# Patient Record
Sex: MALE | Race: BLACK OR AFRICAN AMERICAN | Employment: OTHER | URBAN - METROPOLITAN AREA
[De-identification: names, ages, dates, MRNs, and addresses within clinical notes are randomized per-mention and may not be internally consistent; named-entity substitution may affect disease eponyms.]

---

## 2018-04-18 ENCOUNTER — CARE COORDINATION (OUTPATIENT)
Dept: CARE COORDINATION | Age: 68
End: 2018-04-18

## 2018-05-09 ENCOUNTER — CARE COORDINATION (OUTPATIENT)
Dept: CARE COORDINATION | Age: 68
End: 2018-05-09

## 2018-05-24 ENCOUNTER — HOSPITAL ENCOUNTER (OUTPATIENT)
Age: 68
Discharge: HOME | End: 2018-05-24
Payer: MEDICARE

## 2018-05-24 DIAGNOSIS — E78.5 DYSLIPIDEMIA: ICD-10-CM

## 2018-05-24 DIAGNOSIS — N52.9 ERECTILE DYSFUNCTION, UNSPECIFIED ERECTILE DYSFUNCTION TYPE: ICD-10-CM

## 2018-05-24 DIAGNOSIS — E66.9 DIABETES MELLITUS TYPE 2 IN OBESE (HCC): Chronic | ICD-10-CM

## 2018-05-24 DIAGNOSIS — E11.69 DIABETES MELLITUS TYPE 2 IN OBESE (HCC): Chronic | ICD-10-CM

## 2018-05-24 LAB
CHOLESTEROL, TOTAL: 139 MG/DL (ref 0–199)
CREATININE URINE: 217 MG/DL (ref 40–278)
HBA1C MFR BLD: 6.7 % (ref 4.8–5.9)
HDLC SERPL-MCNC: 54 MG/DL
LDL CHOLESTEROL CALCULATED: 64 MG/DL (ref 0–99)
MICROALBUMIN UR-MCNC: <12 MG/L
MICROALBUMIN/CREAT UR-RTO: ABNORMAL (ref 0–30)
TRIGL SERPL-MCNC: 107 MG/DL (ref 0–149)
TSH SERPL DL<=0.05 MIU/L-ACNC: 3.31 UIU/ML (ref 0.27–4.2)
VLDLC SERPL CALC-MCNC: 21 MG/DL

## 2018-05-24 PROCEDURE — 82044 UR ALBUMIN SEMIQUANTITATIVE: CPT

## 2018-05-24 PROCEDURE — 36415 COLL VENOUS BLD VENIPUNCTURE: CPT

## 2018-05-24 PROCEDURE — 80061 LIPID PANEL: CPT

## 2018-05-24 PROCEDURE — 82570 ASSAY OF URINE CREATININE: CPT

## 2018-05-24 PROCEDURE — 83036 HEMOGLOBIN GLYCOSYLATED A1C: CPT

## 2018-05-24 PROCEDURE — 84443 ASSAY THYROID STIM HORMONE: CPT

## 2018-05-30 ENCOUNTER — CARE COORDINATION (OUTPATIENT)
Dept: CARE COORDINATION | Age: 68
End: 2018-05-30

## 2018-06-01 ENCOUNTER — OFFICE VISIT (OUTPATIENT)
Dept: INTERNAL MEDICINE | Age: 68
End: 2018-06-01
Payer: MEDICARE

## 2018-06-01 VITALS
HEART RATE: 88 BPM | TEMPERATURE: 97.7 F | DIASTOLIC BLOOD PRESSURE: 75 MMHG | WEIGHT: 315 LBS | RESPIRATION RATE: 16 BRPM | SYSTOLIC BLOOD PRESSURE: 134 MMHG | BODY MASS INDEX: 41.75 KG/M2 | HEIGHT: 73 IN

## 2018-06-01 DIAGNOSIS — E11.69 DIABETES MELLITUS TYPE 2 IN OBESE (HCC): Chronic | ICD-10-CM

## 2018-06-01 DIAGNOSIS — N39.8 VOIDING DYSFUNCTION: Chronic | ICD-10-CM

## 2018-06-01 DIAGNOSIS — E78.5 DYSLIPIDEMIA: ICD-10-CM

## 2018-06-01 DIAGNOSIS — N52.9 ERECTILE DYSFUNCTION, UNSPECIFIED ERECTILE DYSFUNCTION TYPE: ICD-10-CM

## 2018-06-01 DIAGNOSIS — E66.9 DIABETES MELLITUS TYPE 2 IN OBESE (HCC): Chronic | ICD-10-CM

## 2018-06-01 PROCEDURE — 4040F PNEUMOC VAC/ADMIN/RCVD: CPT | Performed by: INTERNAL MEDICINE

## 2018-06-01 PROCEDURE — 1123F ACP DISCUSS/DSCN MKR DOCD: CPT | Performed by: INTERNAL MEDICINE

## 2018-06-01 PROCEDURE — 99212 OFFICE O/P EST SF 10 MIN: CPT | Performed by: INTERNAL MEDICINE

## 2018-06-01 PROCEDURE — 3017F COLORECTAL CA SCREEN DOC REV: CPT | Performed by: INTERNAL MEDICINE

## 2018-06-01 PROCEDURE — 3044F HG A1C LEVEL LT 7.0%: CPT | Performed by: INTERNAL MEDICINE

## 2018-06-01 PROCEDURE — 4004F PT TOBACCO SCREEN RCVD TLK: CPT | Performed by: INTERNAL MEDICINE

## 2018-06-01 PROCEDURE — 99213 OFFICE O/P EST LOW 20 MIN: CPT | Performed by: INTERNAL MEDICINE

## 2018-06-01 PROCEDURE — G8427 DOCREV CUR MEDS BY ELIG CLIN: HCPCS | Performed by: INTERNAL MEDICINE

## 2018-06-01 PROCEDURE — 2022F DILAT RTA XM EVC RTNOPTHY: CPT | Performed by: INTERNAL MEDICINE

## 2018-06-01 PROCEDURE — G8417 CALC BMI ABV UP PARAM F/U: HCPCS | Performed by: INTERNAL MEDICINE

## 2018-06-01 RX ORDER — LANCETS 30 GAUGE
EACH MISCELLANEOUS
Qty: 100 EACH | Refills: 3 | Status: SHIPPED | OUTPATIENT
Start: 2018-06-01 | End: 2019-02-04 | Stop reason: SDUPTHER

## 2018-06-01 RX ORDER — ATORVASTATIN CALCIUM 20 MG/1
20 TABLET, FILM COATED ORAL DAILY
Qty: 30 TABLET | Refills: 3 | Status: ON HOLD | OUTPATIENT
Start: 2018-06-01 | End: 2018-06-26 | Stop reason: HOSPADM

## 2018-06-01 RX ORDER — SILDENAFIL CITRATE 20 MG/1
TABLET ORAL
Qty: 30 TABLET | Refills: 0 | Status: ON HOLD | OUTPATIENT
Start: 2018-06-01 | End: 2018-06-26 | Stop reason: HOSPADM

## 2018-06-01 RX ORDER — TERAZOSIN 1 MG/1
CAPSULE ORAL
Qty: 30 CAPSULE | Refills: 3 | Status: SHIPPED | OUTPATIENT
Start: 2018-06-01 | End: 2018-09-10 | Stop reason: SDUPTHER

## 2018-06-01 RX ORDER — MECLIZINE HYDROCHLORIDE 25 MG/1
25 TABLET ORAL DAILY PRN
Qty: 30 TABLET | Refills: 3 | Status: SHIPPED | OUTPATIENT
Start: 2018-06-01 | End: 2018-09-10 | Stop reason: SDUPTHER

## 2018-06-01 ASSESSMENT — ENCOUNTER SYMPTOMS
DIARRHEA: 0
BLURRED VISION: 0
WHEEZING: 0
COUGH: 0
VOMITING: 0
SHORTNESS OF BREATH: 0
ABDOMINAL PAIN: 0
EYE DISCHARGE: 0
SORE THROAT: 0
NAUSEA: 0

## 2018-06-04 ENCOUNTER — CARE COORDINATION (OUTPATIENT)
Dept: CARE COORDINATION | Age: 68
End: 2018-06-04

## 2018-06-20 ENCOUNTER — CARE COORDINATION (OUTPATIENT)
Dept: CARE COORDINATION | Age: 68
End: 2018-06-20

## 2018-06-24 ENCOUNTER — APPOINTMENT (OUTPATIENT)
Dept: GENERAL RADIOLOGY | Age: 68
DRG: 062 | End: 2018-06-24
Payer: MEDICARE

## 2018-06-24 ENCOUNTER — HOSPITAL ENCOUNTER (INPATIENT)
Age: 68
LOS: 3 days | Discharge: HOME | DRG: 062 | End: 2018-06-27
Attending: EMERGENCY MEDICINE | Admitting: INTERNAL MEDICINE
Payer: MEDICARE

## 2018-06-24 ENCOUNTER — APPOINTMENT (OUTPATIENT)
Dept: CT IMAGING | Age: 68
DRG: 062 | End: 2018-06-24
Payer: MEDICARE

## 2018-06-24 DIAGNOSIS — Z92.82 RECEIVED INTRAVENOUS TISSUE PLASMINOGEN ACTIVATOR (TPA) IN EMERGENCY DEPARTMENT: ICD-10-CM

## 2018-06-24 DIAGNOSIS — E11.69 DIABETES MELLITUS TYPE 2 IN OBESE (HCC): Chronic | ICD-10-CM

## 2018-06-24 DIAGNOSIS — I63.431 CEREBROVASCULAR ACCIDENT (CVA) DUE TO EMBOLISM OF RIGHT POSTERIOR CEREBRAL ARTERY (HCC): ICD-10-CM

## 2018-06-24 DIAGNOSIS — I63.411 CEREBROVASCULAR ACCIDENT (CVA) DUE TO EMBOLISM OF RIGHT MIDDLE CEREBRAL ARTERY (HCC): ICD-10-CM

## 2018-06-24 DIAGNOSIS — I63.9 CEREBROVASCULAR ACCIDENT (CVA), UNSPECIFIED MECHANISM (HCC): Primary | ICD-10-CM

## 2018-06-24 DIAGNOSIS — E66.9 DIABETES MELLITUS TYPE 2 IN OBESE (HCC): Chronic | ICD-10-CM

## 2018-06-24 LAB
ALBUMIN SERPL-MCNC: 3.7 G/DL (ref 3.5–5.2)
ALP BLD-CCNC: 73 U/L (ref 40–129)
ALT SERPL-CCNC: 14 U/L (ref 0–40)
ANION GAP SERPL CALCULATED.3IONS-SCNC: 14 MMOL/L (ref 7–16)
APTT: 33.8 SEC (ref 24.5–35.1)
AST SERPL-CCNC: 12 U/L (ref 0–39)
BASOPHILS ABSOLUTE: 0.04 E9/L (ref 0–0.2)
BASOPHILS RELATIVE PERCENT: 0.6 % (ref 0–2)
BILIRUB SERPL-MCNC: 0.4 MG/DL (ref 0–1.2)
BUN BLDV-MCNC: 12 MG/DL (ref 8–23)
CALCIUM SERPL-MCNC: 8.8 MG/DL (ref 8.6–10.2)
CHLORIDE BLD-SCNC: 103 MMOL/L (ref 98–107)
CO2: 23 MMOL/L (ref 22–29)
CREAT SERPL-MCNC: 1 MG/DL (ref 0.7–1.2)
EOSINOPHILS ABSOLUTE: 0.08 E9/L (ref 0.05–0.5)
EOSINOPHILS RELATIVE PERCENT: 1.2 % (ref 0–6)
GFR AFRICAN AMERICAN: >60
GFR NON-AFRICAN AMERICAN: >60 ML/MIN/1.73
GLUCOSE BLD-MCNC: 166 MG/DL (ref 74–109)
HCT VFR BLD CALC: 38.5 % (ref 37–54)
HEMOGLOBIN: 12.3 G/DL (ref 12.5–16.5)
IMMATURE GRANULOCYTES #: 0.02 E9/L
IMMATURE GRANULOCYTES %: 0.3 % (ref 0–5)
INR BLD: 1
LYMPHOCYTES ABSOLUTE: 3.28 E9/L (ref 1.5–4)
LYMPHOCYTES RELATIVE PERCENT: 48.1 % (ref 20–42)
MCH RBC QN AUTO: 28.7 PG (ref 26–35)
MCHC RBC AUTO-ENTMCNC: 31.9 % (ref 32–34.5)
MCV RBC AUTO: 89.7 FL (ref 80–99.9)
METER GLUCOSE: 120 MG/DL (ref 70–110)
METER GLUCOSE: 142 MG/DL (ref 70–110)
METER GLUCOSE: 142 MG/DL (ref 70–110)
METER GLUCOSE: 168 MG/DL (ref 70–110)
MONOCYTES ABSOLUTE: 0.62 E9/L (ref 0.1–0.95)
MONOCYTES RELATIVE PERCENT: 9.1 % (ref 2–12)
NEUTROPHILS ABSOLUTE: 2.78 E9/L (ref 1.8–7.3)
NEUTROPHILS RELATIVE PERCENT: 40.7 % (ref 43–80)
PDW BLD-RTO: 12.6 FL (ref 11.5–15)
PLATELET # BLD: 305 E9/L (ref 130–450)
PMV BLD AUTO: 9.1 FL (ref 7–12)
POTASSIUM SERPL-SCNC: 4.3 MMOL/L (ref 3.5–5)
PROTHROMBIN TIME: 11.9 SEC (ref 9.3–12.4)
RBC # BLD: 4.29 E12/L (ref 3.8–5.8)
SODIUM BLD-SCNC: 140 MMOL/L (ref 132–146)
TOTAL PROTEIN: 7.8 G/DL (ref 6.4–8.3)
TROPONIN: <0.01 NG/ML (ref 0–0.03)
WBC # BLD: 6.8 E9/L (ref 4.5–11.5)

## 2018-06-24 PROCEDURE — 87081 CULTURE SCREEN ONLY: CPT

## 2018-06-24 PROCEDURE — 6360000002 HC RX W HCPCS: Performed by: EMERGENCY MEDICINE

## 2018-06-24 PROCEDURE — 84484 ASSAY OF TROPONIN QUANT: CPT

## 2018-06-24 PROCEDURE — 70450 CT HEAD/BRAIN W/O DYE: CPT

## 2018-06-24 PROCEDURE — 99285 EMERGENCY DEPT VISIT HI MDM: CPT

## 2018-06-24 PROCEDURE — 85025 COMPLETE CBC W/AUTO DIFF WBC: CPT

## 2018-06-24 PROCEDURE — 6370000000 HC RX 637 (ALT 250 FOR IP): Performed by: INTERNAL MEDICINE

## 2018-06-24 PROCEDURE — 82962 GLUCOSE BLOOD TEST: CPT

## 2018-06-24 PROCEDURE — 70498 CT ANGIOGRAPHY NECK: CPT

## 2018-06-24 PROCEDURE — 85610 PROTHROMBIN TIME: CPT

## 2018-06-24 PROCEDURE — 3E03317 INTRODUCTION OF OTHER THROMBOLYTIC INTO PERIPHERAL VEIN, PERCUTANEOUS APPROACH: ICD-10-PCS | Performed by: INTERNAL MEDICINE

## 2018-06-24 PROCEDURE — 94760 N-INVAS EAR/PLS OXIMETRY 1: CPT

## 2018-06-24 PROCEDURE — 36415 COLL VENOUS BLD VENIPUNCTURE: CPT

## 2018-06-24 PROCEDURE — 70496 CT ANGIOGRAPHY HEAD: CPT

## 2018-06-24 PROCEDURE — 71045 X-RAY EXAM CHEST 1 VIEW: CPT

## 2018-06-24 PROCEDURE — 93005 ELECTROCARDIOGRAM TRACING: CPT | Performed by: EMERGENCY MEDICINE

## 2018-06-24 PROCEDURE — 2000000000 HC ICU R&B

## 2018-06-24 PROCEDURE — 85730 THROMBOPLASTIN TIME PARTIAL: CPT

## 2018-06-24 PROCEDURE — 80053 COMPREHEN METABOLIC PANEL: CPT

## 2018-06-24 PROCEDURE — 2580000003 HC RX 258: Performed by: INTERNAL MEDICINE

## 2018-06-24 PROCEDURE — 0042T CT BRAIN PERFUSION: CPT

## 2018-06-24 PROCEDURE — 6360000004 HC RX CONTRAST MEDICATION: Performed by: RADIOLOGY

## 2018-06-24 RX ORDER — ACETAMINOPHEN 325 MG/1
650 TABLET ORAL EVERY 6 HOURS PRN
Status: DISCONTINUED | OUTPATIENT
Start: 2018-06-24 | End: 2018-06-27 | Stop reason: HOSPADM

## 2018-06-24 RX ORDER — NICOTINE POLACRILEX 4 MG
15 LOZENGE BUCCAL PRN
Status: DISCONTINUED | OUTPATIENT
Start: 2018-06-24 | End: 2018-06-27 | Stop reason: HOSPADM

## 2018-06-24 RX ORDER — DEXTROSE MONOHYDRATE 50 MG/ML
100 INJECTION, SOLUTION INTRAVENOUS PRN
Status: DISCONTINUED | OUTPATIENT
Start: 2018-06-24 | End: 2018-06-27 | Stop reason: HOSPADM

## 2018-06-24 RX ORDER — OXYCODONE HCL 20 MG/1
60 TABLET, FILM COATED, EXTENDED RELEASE ORAL NIGHTLY
Status: DISCONTINUED | OUTPATIENT
Start: 2018-06-24 | End: 2018-06-27 | Stop reason: HOSPADM

## 2018-06-24 RX ORDER — DEXTROSE MONOHYDRATE 25 G/50ML
12.5 INJECTION, SOLUTION INTRAVENOUS
Status: ACTIVE | OUTPATIENT
Start: 2018-06-24 | End: 2018-06-24

## 2018-06-24 RX ORDER — ATORVASTATIN CALCIUM 40 MG/1
40 TABLET, FILM COATED ORAL NIGHTLY
Status: DISCONTINUED | OUTPATIENT
Start: 2018-06-24 | End: 2018-06-27 | Stop reason: HOSPADM

## 2018-06-24 RX ORDER — ONDANSETRON 2 MG/ML
4 INJECTION INTRAMUSCULAR; INTRAVENOUS EVERY 6 HOURS PRN
Status: DISCONTINUED | OUTPATIENT
Start: 2018-06-24 | End: 2018-06-27 | Stop reason: HOSPADM

## 2018-06-24 RX ORDER — SODIUM CHLORIDE 9 MG/ML
INJECTION, SOLUTION INTRAVENOUS CONTINUOUS
Status: ACTIVE | OUTPATIENT
Start: 2018-06-24 | End: 2018-06-24

## 2018-06-24 RX ORDER — 0.9 % SODIUM CHLORIDE 0.9 %
500 INTRAVENOUS SOLUTION INTRAVENOUS ONCE
Status: COMPLETED | OUTPATIENT
Start: 2018-06-24 | End: 2018-06-24

## 2018-06-24 RX ORDER — HYDRALAZINE HYDROCHLORIDE 20 MG/ML
10 INJECTION INTRAMUSCULAR; INTRAVENOUS EVERY 10 MIN PRN
Status: DISCONTINUED | OUTPATIENT
Start: 2018-06-24 | End: 2018-06-25

## 2018-06-24 RX ORDER — SODIUM CHLORIDE 0.9 % (FLUSH) 0.9 %
10 SYRINGE (ML) INJECTION PRN
Status: DISCONTINUED | OUTPATIENT
Start: 2018-06-24 | End: 2018-06-27 | Stop reason: HOSPADM

## 2018-06-24 RX ORDER — SODIUM CHLORIDE 0.9 % (FLUSH) 0.9 %
10 SYRINGE (ML) INJECTION EVERY 12 HOURS SCHEDULED
Status: DISCONTINUED | OUTPATIENT
Start: 2018-06-24 | End: 2018-06-27 | Stop reason: HOSPADM

## 2018-06-24 RX ORDER — DEXTROSE MONOHYDRATE 25 G/50ML
12.5 INJECTION, SOLUTION INTRAVENOUS PRN
Status: DISCONTINUED | OUTPATIENT
Start: 2018-06-24 | End: 2018-06-27 | Stop reason: HOSPADM

## 2018-06-24 RX ORDER — OXYCODONE HYDROCHLORIDE 80 MG/1
80 TABLET, FILM COATED, EXTENDED RELEASE ORAL EVERY MORNING
Status: DISCONTINUED | OUTPATIENT
Start: 2018-06-25 | End: 2018-06-27 | Stop reason: HOSPADM

## 2018-06-24 RX ORDER — LABETALOL HYDROCHLORIDE 5 MG/ML
10 INJECTION, SOLUTION INTRAVENOUS EVERY 10 MIN PRN
Status: DISCONTINUED | OUTPATIENT
Start: 2018-06-24 | End: 2018-06-25

## 2018-06-24 RX ADMIN — SODIUM CHLORIDE 500 ML: 9 INJECTION, SOLUTION INTRAVENOUS at 18:09

## 2018-06-24 RX ADMIN — INSULIN LISPRO 2 UNITS: 100 INJECTION, SOLUTION INTRAVENOUS; SUBCUTANEOUS at 17:08

## 2018-06-24 RX ADMIN — ALTEPLASE 81 MG: KIT at 11:14

## 2018-06-24 RX ADMIN — ATORVASTATIN CALCIUM 40 MG: 40 TABLET, FILM COATED ORAL at 20:46

## 2018-06-24 RX ADMIN — SODIUM CHLORIDE: 9 INJECTION, SOLUTION INTRAVENOUS at 20:47

## 2018-06-24 RX ADMIN — INSULIN LISPRO 1 UNITS: 100 INJECTION, SOLUTION INTRAVENOUS; SUBCUTANEOUS at 20:46

## 2018-06-24 RX ADMIN — IOPAMIDOL 100 ML: 755 INJECTION, SOLUTION INTRAVENOUS at 11:00

## 2018-06-24 RX ADMIN — Medication 10 ML: at 20:46

## 2018-06-24 RX ADMIN — OXYCODONE HYDROCHLORIDE 60 MG: 20 TABLET, FILM COATED, EXTENDED RELEASE ORAL at 19:18

## 2018-06-24 ASSESSMENT — PAIN DESCRIPTION - DESCRIPTORS
DESCRIPTORS: CONSTANT
DESCRIPTORS: ACHING;DISCOMFORT;SORE

## 2018-06-24 ASSESSMENT — PAIN DESCRIPTION - PROGRESSION
CLINICAL_PROGRESSION: NOT CHANGED
CLINICAL_PROGRESSION: NOT CHANGED

## 2018-06-24 ASSESSMENT — PAIN DESCRIPTION - ONSET: ONSET: ON-GOING

## 2018-06-24 ASSESSMENT — PAIN DESCRIPTION - FREQUENCY: FREQUENCY: INTERMITTENT

## 2018-06-24 ASSESSMENT — PAIN SCALES - GENERAL
PAINLEVEL_OUTOF10: 0
PAINLEVEL_OUTOF10: 0
PAINLEVEL_OUTOF10: 3
PAINLEVEL_OUTOF10: 7
PAINLEVEL_OUTOF10: 8

## 2018-06-24 ASSESSMENT — PAIN DESCRIPTION - LOCATION
LOCATION: BACK
LOCATION: BACK

## 2018-06-24 ASSESSMENT — PAIN DESCRIPTION - ORIENTATION
ORIENTATION: LOWER;MID
ORIENTATION: MID

## 2018-06-24 ASSESSMENT — PAIN DESCRIPTION - PAIN TYPE
TYPE: CHRONIC PAIN
TYPE: CHRONIC PAIN

## 2018-06-24 ASSESSMENT — PAIN DESCRIPTION - DIRECTION: RADIATING_TOWARDS: LEFT LEG

## 2018-06-25 ENCOUNTER — APPOINTMENT (OUTPATIENT)
Dept: CT IMAGING | Age: 68
DRG: 062 | End: 2018-06-25
Payer: MEDICARE

## 2018-06-25 LAB
ANION GAP SERPL CALCULATED.3IONS-SCNC: 11 MMOL/L (ref 7–16)
BASOPHILS ABSOLUTE: 0.03 E9/L (ref 0–0.2)
BASOPHILS RELATIVE PERCENT: 0.5 % (ref 0–2)
BUN BLDV-MCNC: 10 MG/DL (ref 8–23)
CALCIUM SERPL-MCNC: 8.1 MG/DL (ref 8.6–10.2)
CHLORIDE BLD-SCNC: 101 MMOL/L (ref 98–107)
CO2: 25 MMOL/L (ref 22–29)
CREAT SERPL-MCNC: 0.9 MG/DL (ref 0.7–1.2)
EKG ATRIAL RATE: 71 BPM
EKG P AXIS: 71 DEGREES
EKG P-R INTERVAL: 172 MS
EKG Q-T INTERVAL: 414 MS
EKG QRS DURATION: 106 MS
EKG QTC CALCULATION (BAZETT): 449 MS
EKG R AXIS: -35 DEGREES
EKG T AXIS: 16 DEGREES
EKG VENTRICULAR RATE: 71 BPM
EOSINOPHILS ABSOLUTE: 0.11 E9/L (ref 0.05–0.5)
EOSINOPHILS RELATIVE PERCENT: 1.7 % (ref 0–6)
GFR AFRICAN AMERICAN: >60
GFR NON-AFRICAN AMERICAN: >60 ML/MIN/1.73
GLUCOSE BLD-MCNC: 139 MG/DL (ref 74–109)
HCT VFR BLD CALC: 33.3 % (ref 37–54)
HEMOGLOBIN: 10.7 G/DL (ref 12.5–16.5)
IMMATURE GRANULOCYTES #: 0.02 E9/L
IMMATURE GRANULOCYTES %: 0.3 % (ref 0–5)
LYMPHOCYTES ABSOLUTE: 3.03 E9/L (ref 1.5–4)
LYMPHOCYTES RELATIVE PERCENT: 47.3 % (ref 20–42)
MCH RBC QN AUTO: 28.9 PG (ref 26–35)
MCHC RBC AUTO-ENTMCNC: 32.1 % (ref 32–34.5)
MCV RBC AUTO: 90 FL (ref 80–99.9)
METER GLUCOSE: 125 MG/DL (ref 70–110)
METER GLUCOSE: 148 MG/DL (ref 70–110)
METER GLUCOSE: 172 MG/DL (ref 70–110)
METER GLUCOSE: 179 MG/DL (ref 70–110)
METER GLUCOSE: 180 MG/DL (ref 70–110)
MONOCYTES ABSOLUTE: 0.61 E9/L (ref 0.1–0.95)
MONOCYTES RELATIVE PERCENT: 9.5 % (ref 2–12)
NEUTROPHILS ABSOLUTE: 2.6 E9/L (ref 1.8–7.3)
NEUTROPHILS RELATIVE PERCENT: 40.7 % (ref 43–80)
PDW BLD-RTO: 12.7 FL (ref 11.5–15)
PLATELET # BLD: 246 E9/L (ref 130–450)
PMV BLD AUTO: 9.2 FL (ref 7–12)
POTASSIUM REFLEX MAGNESIUM: 4 MMOL/L (ref 3.5–5)
RBC # BLD: 3.7 E12/L (ref 3.8–5.8)
SODIUM BLD-SCNC: 137 MMOL/L (ref 132–146)
WBC # BLD: 6.4 E9/L (ref 4.5–11.5)

## 2018-06-25 PROCEDURE — 97161 PT EVAL LOW COMPLEX 20 MIN: CPT

## 2018-06-25 PROCEDURE — 97165 OT EVAL LOW COMPLEX 30 MIN: CPT

## 2018-06-25 PROCEDURE — 1200000000 HC SEMI PRIVATE

## 2018-06-25 PROCEDURE — APPNB60 APP NON BILLABLE TIME 46-60 MINS: Performed by: NURSE PRACTITIONER

## 2018-06-25 PROCEDURE — 82962 GLUCOSE BLOOD TEST: CPT

## 2018-06-25 PROCEDURE — 80048 BASIC METABOLIC PNL TOTAL CA: CPT

## 2018-06-25 PROCEDURE — 2580000003 HC RX 258: Performed by: INTERNAL MEDICINE

## 2018-06-25 PROCEDURE — 6370000000 HC RX 637 (ALT 250 FOR IP): Performed by: INTERNAL MEDICINE

## 2018-06-25 PROCEDURE — 36415 COLL VENOUS BLD VENIPUNCTURE: CPT

## 2018-06-25 PROCEDURE — G8987 SELF CARE CURRENT STATUS: HCPCS

## 2018-06-25 PROCEDURE — 97530 THERAPEUTIC ACTIVITIES: CPT

## 2018-06-25 PROCEDURE — G8988 SELF CARE GOAL STATUS: HCPCS

## 2018-06-25 PROCEDURE — G8978 MOBILITY CURRENT STATUS: HCPCS

## 2018-06-25 PROCEDURE — 99223 1ST HOSP IP/OBS HIGH 75: CPT | Performed by: INTERNAL MEDICINE

## 2018-06-25 PROCEDURE — 70450 CT HEAD/BRAIN W/O DYE: CPT

## 2018-06-25 PROCEDURE — G8979 MOBILITY GOAL STATUS: HCPCS

## 2018-06-25 PROCEDURE — 97535 SELF CARE MNGMENT TRAINING: CPT

## 2018-06-25 PROCEDURE — 85025 COMPLETE CBC W/AUTO DIFF WBC: CPT

## 2018-06-25 PROCEDURE — 99291 CRITICAL CARE FIRST HOUR: CPT | Performed by: PSYCHIATRY & NEUROLOGY

## 2018-06-25 RX ORDER — SODIUM CHLORIDE 9 MG/ML
INJECTION, SOLUTION INTRAVENOUS CONTINUOUS
Status: ACTIVE | OUTPATIENT
Start: 2018-06-25 | End: 2018-06-25

## 2018-06-25 RX ORDER — ASPIRIN 81 MG/1
81 TABLET, CHEWABLE ORAL DAILY
Status: DISCONTINUED | OUTPATIENT
Start: 2018-06-25 | End: 2018-06-27 | Stop reason: HOSPADM

## 2018-06-25 RX ADMIN — INSULIN LISPRO 2 UNITS: 100 INJECTION, SOLUTION INTRAVENOUS; SUBCUTANEOUS at 12:45

## 2018-06-25 RX ADMIN — INSULIN LISPRO 1 UNITS: 100 INJECTION, SOLUTION INTRAVENOUS; SUBCUTANEOUS at 20:12

## 2018-06-25 RX ADMIN — OXYCODONE HYDROCHLORIDE 60 MG: 20 TABLET, FILM COATED, EXTENDED RELEASE ORAL at 20:12

## 2018-06-25 RX ADMIN — Medication 10 ML: at 21:00

## 2018-06-25 RX ADMIN — SODIUM CHLORIDE: 9 INJECTION, SOLUTION INTRAVENOUS at 12:45

## 2018-06-25 RX ADMIN — INSULIN LISPRO 2 UNITS: 100 INJECTION, SOLUTION INTRAVENOUS; SUBCUTANEOUS at 08:02

## 2018-06-25 RX ADMIN — ATORVASTATIN CALCIUM 40 MG: 40 TABLET, FILM COATED ORAL at 20:12

## 2018-06-25 RX ADMIN — OXYCODONE HYDROCHLORIDE 80 MG: 80 TABLET, FILM COATED, EXTENDED RELEASE ORAL at 08:02

## 2018-06-25 RX ADMIN — Medication 10 ML: at 08:02

## 2018-06-25 RX ADMIN — ASPIRIN 81 MG CHEWABLE TABLET 81 MG: 81 TABLET CHEWABLE at 20:12

## 2018-06-25 ASSESSMENT — PAIN SCALES - GENERAL
PAINLEVEL_OUTOF10: 0
PAINLEVEL_OUTOF10: 7
PAINLEVEL_OUTOF10: 6
PAINLEVEL_OUTOF10: 6

## 2018-06-25 ASSESSMENT — PAIN DESCRIPTION - DESCRIPTORS: DESCRIPTORS: ACHING;DISCOMFORT

## 2018-06-25 ASSESSMENT — PAIN DESCRIPTION - LOCATION: LOCATION: BACK

## 2018-06-25 ASSESSMENT — PAIN DESCRIPTION - ORIENTATION: ORIENTATION: LOWER

## 2018-06-25 ASSESSMENT — PAIN DESCRIPTION - PAIN TYPE: TYPE: CHRONIC PAIN

## 2018-06-26 ENCOUNTER — HOSPITAL ENCOUNTER (OUTPATIENT)
Age: 68
Discharge: HOME | End: 2018-06-26
Payer: MEDICARE

## 2018-06-26 LAB
LEFT VENTRICULAR EJECTION FRACTION HIGH VALUE: 65 %
LEFT VENTRICULAR EJECTION FRACTION MODE: NORMAL
LV EF: 60 %
LV EF: 63 %
LVEF MODALITY: NORMAL
METER GLUCOSE: 126 MG/DL (ref 70–110)
METER GLUCOSE: 134 MG/DL (ref 70–110)
METER GLUCOSE: 135 MG/DL (ref 70–110)
METER GLUCOSE: 161 MG/DL (ref 70–110)
MRSA CULTURE ONLY: NORMAL

## 2018-06-26 PROCEDURE — A0428 BLS: HCPCS

## 2018-06-26 PROCEDURE — 6370000000 HC RX 637 (ALT 250 FOR IP): Performed by: INTERNAL MEDICINE

## 2018-06-26 PROCEDURE — 2580000003 HC RX 258: Performed by: INTERNAL MEDICINE

## 2018-06-26 PROCEDURE — 99232 SBSQ HOSP IP/OBS MODERATE 35: CPT | Performed by: INTERNAL MEDICINE

## 2018-06-26 PROCEDURE — 93306 TTE W/DOPPLER COMPLETE: CPT

## 2018-06-26 PROCEDURE — A0425 GROUND MILEAGE: HCPCS

## 2018-06-26 PROCEDURE — 1200000000 HC SEMI PRIVATE

## 2018-06-26 PROCEDURE — 82962 GLUCOSE BLOOD TEST: CPT

## 2018-06-26 RX ORDER — ATORVASTATIN CALCIUM 40 MG/1
40 TABLET, FILM COATED ORAL NIGHTLY
Qty: 30 TABLET | Refills: 0 | Status: SHIPPED | OUTPATIENT
Start: 2018-06-26 | End: 2018-07-03 | Stop reason: SDUPTHER

## 2018-06-26 RX ADMIN — ATORVASTATIN CALCIUM 40 MG: 40 TABLET, FILM COATED ORAL at 21:31

## 2018-06-26 RX ADMIN — Medication 10 ML: at 09:46

## 2018-06-26 RX ADMIN — Medication 10 ML: at 21:31

## 2018-06-26 RX ADMIN — OXYCODONE HYDROCHLORIDE 80 MG: 80 TABLET, FILM COATED, EXTENDED RELEASE ORAL at 10:16

## 2018-06-26 RX ADMIN — INSULIN LISPRO 1 UNITS: 100 INJECTION, SOLUTION INTRAVENOUS; SUBCUTANEOUS at 21:31

## 2018-06-26 RX ADMIN — ASPIRIN 81 MG CHEWABLE TABLET 81 MG: 81 TABLET CHEWABLE at 09:45

## 2018-06-26 RX ADMIN — OXYCODONE HYDROCHLORIDE 60 MG: 20 TABLET, FILM COATED, EXTENDED RELEASE ORAL at 21:31

## 2018-06-26 ASSESSMENT — PAIN SCALES - GENERAL
PAINLEVEL_OUTOF10: 5
PAINLEVEL_OUTOF10: 0
PAINLEVEL_OUTOF10: 7
PAINLEVEL_OUTOF10: 0

## 2018-06-26 ASSESSMENT — PAIN DESCRIPTION - LOCATION: LOCATION: BACK;LEG

## 2018-06-26 ASSESSMENT — PAIN DESCRIPTION - ONSET: ONSET: ON-GOING

## 2018-06-26 ASSESSMENT — PAIN DESCRIPTION - DESCRIPTORS: DESCRIPTORS: ACHING;DISCOMFORT

## 2018-06-26 ASSESSMENT — PAIN DESCRIPTION - PAIN TYPE
TYPE: CHRONIC PAIN
TYPE: CHRONIC PAIN

## 2018-06-26 ASSESSMENT — PAIN DESCRIPTION - ORIENTATION: ORIENTATION: LEFT

## 2018-06-26 ASSESSMENT — PAIN DESCRIPTION - FREQUENCY: FREQUENCY: CONTINUOUS

## 2018-06-26 ASSESSMENT — PAIN DESCRIPTION - PROGRESSION: CLINICAL_PROGRESSION: NOT CHANGED

## 2018-06-27 ENCOUNTER — APPOINTMENT (OUTPATIENT)
Dept: MRI IMAGING | Age: 68
DRG: 062 | End: 2018-06-27
Payer: MEDICARE

## 2018-06-27 VITALS
WEIGHT: 315 LBS | SYSTOLIC BLOOD PRESSURE: 126 MMHG | TEMPERATURE: 97.7 F | HEART RATE: 56 BPM | HEIGHT: 73 IN | DIASTOLIC BLOOD PRESSURE: 67 MMHG | BODY MASS INDEX: 41.75 KG/M2 | OXYGEN SATURATION: 98 % | RESPIRATION RATE: 18 BRPM

## 2018-06-27 LAB
METER GLUCOSE: 139 MG/DL (ref 70–110)
METER GLUCOSE: 143 MG/DL (ref 70–110)

## 2018-06-27 PROCEDURE — 82962 GLUCOSE BLOOD TEST: CPT

## 2018-06-27 PROCEDURE — 99232 SBSQ HOSP IP/OBS MODERATE 35: CPT | Performed by: NURSE PRACTITIONER

## 2018-06-27 PROCEDURE — 6370000000 HC RX 637 (ALT 250 FOR IP): Performed by: INTERNAL MEDICINE

## 2018-06-27 PROCEDURE — 97530 THERAPEUTIC ACTIVITIES: CPT

## 2018-06-27 PROCEDURE — 97110 THERAPEUTIC EXERCISES: CPT

## 2018-06-27 PROCEDURE — 97535 SELF CARE MNGMENT TRAINING: CPT

## 2018-06-27 PROCEDURE — 99238 HOSP IP/OBS DSCHRG MGMT 30/<: CPT | Performed by: INTERNAL MEDICINE

## 2018-06-27 PROCEDURE — 70551 MRI BRAIN STEM W/O DYE: CPT

## 2018-06-27 PROCEDURE — 2580000003 HC RX 258: Performed by: INTERNAL MEDICINE

## 2018-06-27 RX ADMIN — INSULIN LISPRO 2 UNITS: 100 INJECTION, SOLUTION INTRAVENOUS; SUBCUTANEOUS at 09:26

## 2018-06-27 RX ADMIN — Medication 10 ML: at 09:24

## 2018-06-27 RX ADMIN — OXYCODONE HYDROCHLORIDE 80 MG: 80 TABLET, FILM COATED, EXTENDED RELEASE ORAL at 09:25

## 2018-06-27 RX ADMIN — ASPIRIN 81 MG CHEWABLE TABLET 81 MG: 81 TABLET CHEWABLE at 09:24

## 2018-06-27 ASSESSMENT — PAIN SCALES - GENERAL
PAINLEVEL_OUTOF10: 7
PAINLEVEL_OUTOF10: 0
PAINLEVEL_OUTOF10: 0

## 2018-06-28 ENCOUNTER — CARE COORDINATION (OUTPATIENT)
Dept: CASE MANAGEMENT | Age: 68
End: 2018-06-28

## 2018-06-28 DIAGNOSIS — I63.9 ACUTE CVA (CEREBROVASCULAR ACCIDENT) (HCC): Primary | ICD-10-CM

## 2018-06-28 PROCEDURE — 1111F DSCHRG MED/CURRENT MED MERGE: CPT | Performed by: INTERNAL MEDICINE

## 2018-07-03 ENCOUNTER — OFFICE VISIT (OUTPATIENT)
Dept: INTERNAL MEDICINE | Age: 68
End: 2018-07-03
Payer: MEDICARE

## 2018-07-03 VITALS
RESPIRATION RATE: 16 BRPM | DIASTOLIC BLOOD PRESSURE: 73 MMHG | TEMPERATURE: 98.2 F | SYSTOLIC BLOOD PRESSURE: 121 MMHG | HEIGHT: 74 IN | WEIGHT: 315 LBS | BODY MASS INDEX: 40.43 KG/M2 | HEART RATE: 75 BPM

## 2018-07-03 DIAGNOSIS — G45.9 TRANSIENT CEREBRAL ISCHEMIA, UNSPECIFIED TYPE: Primary | ICD-10-CM

## 2018-07-03 DIAGNOSIS — E66.9 DIABETES MELLITUS TYPE 2 IN OBESE (HCC): Chronic | ICD-10-CM

## 2018-07-03 DIAGNOSIS — E11.69 DIABETES MELLITUS TYPE 2 IN OBESE (HCC): Chronic | ICD-10-CM

## 2018-07-03 PROCEDURE — 1123F ACP DISCUSS/DSCN MKR DOCD: CPT | Performed by: INTERNAL MEDICINE

## 2018-07-03 PROCEDURE — 99213 OFFICE O/P EST LOW 20 MIN: CPT | Performed by: INTERNAL MEDICINE

## 2018-07-03 PROCEDURE — 1101F PT FALLS ASSESS-DOCD LE1/YR: CPT | Performed by: INTERNAL MEDICINE

## 2018-07-03 PROCEDURE — 99212 OFFICE O/P EST SF 10 MIN: CPT | Performed by: INTERNAL MEDICINE

## 2018-07-03 PROCEDURE — 1111F DSCHRG MED/CURRENT MED MERGE: CPT | Performed by: INTERNAL MEDICINE

## 2018-07-03 PROCEDURE — 3017F COLORECTAL CA SCREEN DOC REV: CPT | Performed by: INTERNAL MEDICINE

## 2018-07-03 PROCEDURE — 3044F HG A1C LEVEL LT 7.0%: CPT | Performed by: INTERNAL MEDICINE

## 2018-07-03 PROCEDURE — G8417 CALC BMI ABV UP PARAM F/U: HCPCS | Performed by: INTERNAL MEDICINE

## 2018-07-03 PROCEDURE — 4040F PNEUMOC VAC/ADMIN/RCVD: CPT | Performed by: INTERNAL MEDICINE

## 2018-07-03 PROCEDURE — 1036F TOBACCO NON-USER: CPT | Performed by: INTERNAL MEDICINE

## 2018-07-03 PROCEDURE — G8598 ASA/ANTIPLAT THER USED: HCPCS | Performed by: INTERNAL MEDICINE

## 2018-07-03 PROCEDURE — G8427 DOCREV CUR MEDS BY ELIG CLIN: HCPCS | Performed by: INTERNAL MEDICINE

## 2018-07-03 PROCEDURE — 2022F DILAT RTA XM EVC RTNOPTHY: CPT | Performed by: INTERNAL MEDICINE

## 2018-07-03 NOTE — PROGRESS NOTES
Attending Physician Statement  I have discussed the case, including pertinent history and exam findings with the resident. I agree with the assessment, plan and orders as documented by the resident. Pt presented for the follow up of TIA, SP thrombolytics, recent A1C remains stable. Diarrhea with metformin and continue current regimen and follow up in regular schedule in [facility].   Katy Mata

## 2018-07-03 NOTE — PATIENT INSTRUCTIONS
Dear Pineda Pryor,        Thank you for coming to your appointment today. I hope we have addressed all of your needs. Please make sure to do the following:  - Continue your medications as listed. - Get labs drawn before our next follow up. - Referrals have been made to Physical Therapy. If you do not hear from the office in 1 week, please call the number listed. - We will see each other again during our PCP follow up      Have a great day!         Sincerely,  Dlae Pak M.D  7/3/2018  8:29 AM

## 2018-07-04 RX ORDER — ATORVASTATIN CALCIUM 40 MG/1
40 TABLET, FILM COATED ORAL NIGHTLY
Qty: 30 TABLET | Refills: 2 | Status: SHIPPED | OUTPATIENT
Start: 2018-07-04 | End: 2018-11-21 | Stop reason: SDUPTHER

## 2018-07-06 LAB
EKG ATRIAL RATE: 62 BPM
EKG P AXIS: 61 DEGREES
EKG P-R INTERVAL: 178 MS
EKG Q-T INTERVAL: 438 MS
EKG QRS DURATION: 108 MS
EKG QTC CALCULATION (BAZETT): 444 MS
EKG R AXIS: -44 DEGREES
EKG T AXIS: -7 DEGREES
EKG VENTRICULAR RATE: 62 BPM

## 2018-07-10 ENCOUNTER — CARE COORDINATION (OUTPATIENT)
Dept: CASE MANAGEMENT | Age: 68
End: 2018-07-10

## 2018-07-10 NOTE — CARE COORDINATION
[facility] Transitions Follow Up Call    7/10/2018    Patient: Laurent Arias  Patient : 1950   MRN: [id number]  Reason for Admission: There are no discharge diagnoses documented for the most recent discharge. Discharge Date: 18 RARS: Readmission Risk Score: 12       Spoke with: [facility] Transitions Subsequent and Final Call    Subsequent and Final Calls  Care Transitions Interventions  Other Interventions: Follow Up:  Final CTC attempt to megan @ home today. Pleasant and talkative, he states only slight residual numbness following the reported ischemia attack. His diet plan continues of fresh fruit and vegetables,  with weight recently of 363#. We reviewed DM Zone and he states he has a pamphlet in home. BSs have been in 150s and he continues with his metformin therapy. His goal is to lose 6-10 # by his Aug 3 weigh in and to keep his hgba1c wnl. Bps have been 114/68. Back pain is chronic @ 10/10 and he manages it as best he can, he states. Soniya Martin has an OP PT Eval scheduled for , an Aug 3 appointment is scheduled for [address] [facility] MOB Stroke. Soniya Martin is agreeble for CTC to finalize episode and ACC to continue follow of him. This episode will be routed to the department.    Future Appointments  Date Time Provider Daniela Banerjee   2018 2:00 PM Jung Obrien, PT [facility] PT None   8/3/2018 2:20 PM SCHEDULE, JEREL GALEAS MOB STROKE [facility] Stroke [facility]   9/10/2018 10:30 AM Memo Spears MD [facility] IM Logan Odell RN

## 2018-07-12 ENCOUNTER — HOSPITAL ENCOUNTER (OUTPATIENT)
Dept: PHYSICAL THERAPY | Age: 68
Setting detail: THERAPIES SERIES
Discharge: HOME | End: 2018-07-12
Payer: MEDICARE

## 2018-07-12 PROCEDURE — 97161 PT EVAL LOW COMPLEX 20 MIN: CPT

## 2018-07-12 PROCEDURE — G8979 MOBILITY GOAL STATUS: HCPCS

## 2018-07-12 PROCEDURE — G8978 MOBILITY CURRENT STATUS: HCPCS

## 2018-07-12 NOTE — PROGRESS NOTES
[address] [facility]                Phone: [phone]   Fax: [phone]    Physical Therapy Daily Treatment Note  Date:  2018    Patient Name:  Laurent Arias    :  1950  MRN: [id number]    Referring Physician:  Michael Schmidt MD   Insurance Information:  Medicare Part A and B      Evaluation date:  18   Diagnosis:  S/P CVA with tPA administration   Cert Dates:  -   ICD-10 Codes:  G45.9  R 26.9  R27.9    Evaluating Physical Therapist:  Kelley Whtilock DPT  Plan of care signed (Y/N):    Visit# / total visits: -       Subjective:        Exercises:   Exercise/Equipment Reps  During/ after  Other comments    Step one          Step ups         Lateral side stepping        Standing march                    Home Exercise Program:       Comments:       Treatment/Activity Tolerance:  [] Patient tolerated treatment well [] Patient limited by fatique  [] Patient limited by pain  [] Patient limited by other medical complications  [] Other:     Prognosis: [x] Good [] Fair  [] Poor    Patient Requires Follow-up: [x] Yes  [] No    Plan:   [] Continue per plan of care [] Alter current plan (see comments)  [x] Plan of care initiated [] Hold pending MD visit [] Discharge    Plan for Next Session:        See Weekly Progress Note: []  Yes  [x]  No  Next due:        Time In:   Time Out:     Electronically signed by:    Kelley Whitlock DPT  [id number]
CVA.      Stair Negotiation:  8 steps 2 rails Supervision with step to gait pattern. Additional Comments:  Pt reports that he ambulates at times with w/w folded up and uses it \"like a cane for steadying\". Pt does report slightly blurry vision since CVA and also increased stuttering with speech and latent processing. Coordination: mildly impaired finger to nose and heel to shin testing on RUE and RLE. No disdiadochokinesia noted. Summary/Assessment:    Pt presents to outpatient physical therapy with RUE and RLE weakness, impaired coordination, impaired gait mechanics, and decreased independence following recent CVA. PT G-Codes:    PT G-Codes  Functional Assessment Tool Used: professional judgement   Functional Limitation: Mobility: Walking and moving around  Mobility: Walking and Moving Around Current Status (): At least 20 percent but less than 40 percent impaired, limited or restricted  Mobility: Walking and Moving Around Goal Status (): At least 1 percent but less than 20 percent impaired, limited or restricted      Plan:     Below checked are areas for improvement during physical therapy POC:        []  Pain reduction  [x]  Balance Improvement       [x]  Strengthening  [x]  Postural Improvement   [x]  Range of Motion  []  Soft Tissue Improvement    [x]  Gait Training   []  Other:      [x]  Home Exercise Program      Pt will be see for 2-3 visits per week for 4-6 weeks for a total of 8-18 visits to accomplish goals set below:        Short Term/ Long Term Goals: (4-6 weeks)      1. Pt will increase RLE strength to at least 4+/5 throughout    2. Pt will improve RLE coordination to [facility]     3. Pt will be independent with tranfers     4. Pt will ambulate greater than 300' with single point cane as needed at modified independent level     5. Pt will ascend/ descend 12 steps with 1 rail at modified independent level     6. Pt will be independent with HEP.           Pt's potential

## 2018-07-16 ENCOUNTER — HOSPITAL ENCOUNTER (OUTPATIENT)
Dept: PHYSICAL THERAPY | Age: 68
Setting detail: THERAPIES SERIES
Discharge: HOME | End: 2018-07-16
Payer: MEDICARE

## 2018-07-16 PROCEDURE — 97110 THERAPEUTIC EXERCISES: CPT

## 2018-07-16 NOTE — PROGRESS NOTES
Treatment/Activity Tolerance:  [x] Patient tolerated treatment well [x] Patient limited by fatique  [] Patient limited by pain  [] Patient limited by other medical complications  [] Other:     Prognosis: [x] Good [] Fair  [] Poor    Patient Requires Follow-up: [x] Yes  [] No    Plan:   [x] Continue per plan of care [] Alter current plan (see comments)  [] Plan of care initiated [] Hold pending MD visit [] Discharge    Plan for Next Session:    Continue to work on increasing endurance, BLE strength as tolerated.      See Weekly Progress Note: []  Yes  [x]  No  Next due:        Time In: 4486  Time Out: 2840    Electronically signed by:    Sandy Mi DPT  [id number]

## 2018-08-03 ENCOUNTER — OFFICE VISIT (OUTPATIENT)
Dept: NEUROLOGY | Age: 68
End: 2018-08-03
Payer: MEDICARE

## 2018-08-03 VITALS
RESPIRATION RATE: 17 BRPM | HEIGHT: 74 IN | WEIGHT: 315 LBS | SYSTOLIC BLOOD PRESSURE: 121 MMHG | DIASTOLIC BLOOD PRESSURE: 76 MMHG | BODY MASS INDEX: 40.43 KG/M2 | HEART RATE: 105 BPM | OXYGEN SATURATION: 94 %

## 2018-08-03 DIAGNOSIS — I63.9 ACUTE CVA (CEREBROVASCULAR ACCIDENT) (HCC): Primary | ICD-10-CM

## 2018-08-03 PROCEDURE — 3017F COLORECTAL CA SCREEN DOC REV: CPT | Performed by: NURSE PRACTITIONER

## 2018-08-03 PROCEDURE — G8427 DOCREV CUR MEDS BY ELIG CLIN: HCPCS | Performed by: NURSE PRACTITIONER

## 2018-08-03 PROCEDURE — 99214 OFFICE O/P EST MOD 30 MIN: CPT | Performed by: NURSE PRACTITIONER

## 2018-08-03 PROCEDURE — 4040F PNEUMOC VAC/ADMIN/RCVD: CPT | Performed by: NURSE PRACTITIONER

## 2018-08-03 PROCEDURE — 1101F PT FALLS ASSESS-DOCD LE1/YR: CPT | Performed by: NURSE PRACTITIONER

## 2018-08-03 PROCEDURE — 1123F ACP DISCUSS/DSCN MKR DOCD: CPT | Performed by: NURSE PRACTITIONER

## 2018-08-03 PROCEDURE — G8598 ASA/ANTIPLAT THER USED: HCPCS | Performed by: NURSE PRACTITIONER

## 2018-08-03 PROCEDURE — G8417 CALC BMI ABV UP PARAM F/U: HCPCS | Performed by: NURSE PRACTITIONER

## 2018-08-03 PROCEDURE — 99212 OFFICE O/P EST SF 10 MIN: CPT | Performed by: NURSE PRACTITIONER

## 2018-08-03 PROCEDURE — 4004F PT TOBACCO SCREEN RCVD TLK: CPT | Performed by: NURSE PRACTITIONER

## 2018-08-03 NOTE — PROGRESS NOTES
[facility] Clinic Stroke Screen  Physical Therapy, Occupational Therapy & Speech Therapy     Date: 8/3/2018  Name: Monserrat Corey  YOB: 1950  MRN: [id number]    1. Are you receiving therapy services? No  If yes, where? NA    2. What are your goals for therapy? NA  Do you feel you are working towards your goals while in therapy? NA    3. Have you had any falls since discharge from hospital? No    4. Do you have any questions about therapy? No    Education provided to pt: Patient educated on safe technique for functional mobility and fall prevention. PT Screen  ROM BLE: B hip flexion limited by weakness and soft tissue approximation. B knee extension limited by tightness. MMT BLE: B hip flexion 3-/5, R knee extension 3+/5 within range, R ankle DF 4/5, L knee extension 3/5 within range, L ankle DF 4-/5    Transfers  Sit to stand: SBA  Stand to sit: SBA  Stand Pivot: SBA    Gait: 75 feet with SPC SBA    PT Recommendations: Home PT    Iain Meehan, PT, DPT  [id number]      OT Screen  ROM BUE: Pt demo AROM- shoulder <3/4 & elbow 3/4, 1/2 wrist & has a gross grasp & release  MMT BUE: RUE- shoudler  3/5, elbow 3+/5 & wrist 3/5 R  F-, LUE-shoulder, elbow 3+/5 & wrist 3/5 L  F     Tranfers:  Commode transfer: SBA with a sc  DME: sc  Tub transfer: NT  DME: recommend tub bench & assist  Functional mobility: SBA & vc's for posture  Device: sc    Visual/Perceptual: WFL  Cognition: decreased insight & judgement noted    ADL's:  Feeding: independent  Grooming: SBA  Bathing: SBA  Dressing: SBA- Pt report being u/a to don socks or tie shoes. Pt slides into high top tennis shoes that are ill fitting as he walks  Home management: NT  DME: sc    OT Recommendations: Recommend OT to address above problem areas & assist from family to ensure pt safety    Azucena Fall OTR [id number]      Speech Screen  Language:  Word retrieval difficulties  Cognition: Decreased insight, safety concerns present  Speech:

## 2018-08-03 NOTE — PROGRESS NOTES
numbness, tingling + right-sided weakness, + difficulty transferring items from left side to right side   No swallowing difficulties + speech difficulties  + left-sided genital pain radiating into left medial thigh  ROS otherwise negative    Objective:     /76 (Site: Right Arm, Position: Sitting, Cuff Size: Large Adult)   Pulse 105   Resp 17   Ht 6' 2\" (1.88 m)   Wt (!) 371 lb (168.3 kg)   SpO2 94%   BMI 47.63 kg/m²     General Appearance: alert, cooperative, no distress sitting up on exam table, diaphoretic, appears stated age    Head: normocephalic, without obvious abnormality, atraumatic    Eyes: conjunctiva/corneas clear    Neck: supple, symmetrical, trachea midline, no carotid bruit    Lungs: clear to auscultation bilaterally, respirations unlabored    Heart: regular rate and rhythm, S1 and S2 normal   Extremities: normal, atraumatic, no cyanosis or edema   Pulses: 2+ and symmetric all extremities   Skin: color, texture and turgor normal, no rashes or lesions     Mental Status: alert and oriented, thought content appropriate, extinction intact, follows simple commands appropriately   Speech: no dysarthria  Language: difficulty with word retrieval, dysfluency     Cranial Nerves:  I: smell NA   II: visual acuity  NA   II: visual fields Full    II: pupils Equal and reactive    III,VII: ptosis None   III,IV,VI: extraocular muscles  Full ROM without nystagmus   V: mastication Normal   V: facial light touch sensation  Normal   V,VII: corneal reflex     VII: facial muscle function - upper  Normal   VII: facial muscle function - lower Normal   VIII: hearing Normal   IX: soft palate elevation  Normal   IX,X: gag reflex    XI: trapezius strength  5-/5   XI: sternocleidomastoid strength 5-/5   XI: neck extension strength  5-/5   XII: tongue strength  Normal     Motor:  BUE 4+/5  BLE 3+/5  No abnormal movements  Obese bulk and tone     Sensory:  LT and PP normal   Decreased moderately below the ankle bilaterally     Coordination:   FN, FFM decreased on the R   HS normal    Gait:  Unsteady with use of 4-prong cane    DTR:   BE all reflexes  No Riddle's    Laboratory/Radiology:     CBC:   Lab Results   Component Value Date    WBC 6.4 06/25/2018    RBC 3.70 06/25/2018    HGB 10.7 06/25/2018    HCT 33.3 06/25/2018    MCV 90.0 06/25/2018    MCH 28.9 06/25/2018    MCHC 32.1 06/25/2018    RDW 12.7 06/25/2018     06/25/2018    MPV 9.2 06/25/2018     CMP:    Lab Results   Component Value Date     06/25/2018    K 4.0 06/25/2018     06/25/2018    CO2 25 06/25/2018    BUN 10 06/25/2018    CREATININE 0.9 06/25/2018    GFRAA >60 06/25/2018    LABGLOM >60 06/25/2018    GLUCOSE 139 06/25/2018    GLUCOSE 138 08/29/2011    PROT 7.8 06/24/2018    LABALBU 3.7 06/24/2018    LABALBU 4.3 03/04/2011    CALCIUM 8.1 06/25/2018    BILITOT 0.4 06/24/2018    ALKPHOS 73 06/24/2018    AST 12 06/24/2018    ALT 14 06/24/2018     HgBA1c:    Lab Results   Component Value Date    LABA1C 6.7 05/24/2018     FLP:    Lab Results   Component Value Date    TRIG 107 05/24/2018    HDL 54 05/24/2018    LDLCALC 64 05/24/2018    LABVLDL 21 05/24/2018       CT head:   No acute intracranial process. No acute infarct or hemorrhage   identified. If clinically warranted, an MRI which includes   diffusion-weighted sequences, can provide better sensitivity for   detecting a stroke.       Remote right lacunar infarct. Remote infarcts in the posterior fossa,   more prominent on today's exam.         CTA head/neck  CT brain perfusion:   Patent intracranial and extracranial vasculature. Perfusion studies   demonstrate mildly increased cerebral blood flow to the right MCA   territory at the level of the sylvian fissure and operculum with   corresponding increased in mean transit time as well as transit time   delay.  While the intracranial and extracranial vessels are patent the   perfusion studies are suspicious of an acute ischemic event involving stroke with unknown etiology     Continue PT/OT/SLP    No driving    Follow up with PCP regarding new onset of genital pain and history of testicular mass     Follow up with neurology in 3-4 months    Discussed the patients personal risk factors for Stroke /TIA with patient/family, and ways to reduce the risk for a recurrent stroke. Patient's personal risk factors which were identified are:        [x] Hypertension       [x] High cholesterol       [x] Smoking       [x] Overweight       [x] Lack of Exercise       [] Atrial fibrillation       [x] Diabetes       [x] Sleep apnea       [] Excessive alcohol use       [] Use of illicit drugs       [x] Personal history of previous TIA or stroke       [] Personal history of heart disease       [] Family history of stroke or heart disease    Advised patient that you can reduce your risk for stroke/TIA by modifying/controlling the risk factors that you have. Patient advised to take the medications as prescribed, which will be detailed in the discharge instructions, and to not stop taking them without consulting their physician. In addition, pt. advised to maintain a healthy diet, exercise regularly, not to smoke and use CPAP machine. Tevin Bender MSN, APRN, FNP-C  2:56 PM  8/3/18    I spent 40 minutes with this patient obtaining the HPI, discussing the exam as well as discussing our plan of action. All questions were answered prior to leaving my office.

## 2018-08-06 ENCOUNTER — CARE COORDINATION (OUTPATIENT)
Dept: CARE COORDINATION | Age: 68
End: 2018-08-06

## 2018-08-06 NOTE — CARE COORDINATION
Called patient for f/u IP d/c 6/27 dx: Acute ischemic event, R MCA territory s/p tpa (6/24/18), DM, type II, not on insulin, Hyperlipidemia, LATISHA, Morbid Obesity, Erectile Dysfunction  - Mihaela answered the phone saying that patient was doing well that patient was not able to come to the phone but will have him call when he is available. Contact information given.

## 2018-08-09 ENCOUNTER — CARE COORDINATION (OUTPATIENT)
Dept: CARE COORDINATION | Age: 68
End: 2018-08-09

## 2018-08-13 ENCOUNTER — CARE COORDINATION (OUTPATIENT)
Dept: CARE COORDINATION | Age: 68
End: 2018-08-13

## 2018-08-13 NOTE — LETTER
[id number] [facility]          Nuria Gale MD  8/13/2018        Monserrat Madhav  [address] [facility]      Monserrat Corey     It has been my pleasure to assist you in your health care, but I have been assigned to another position and will be transitioning your care to another RN Care Coordinator at [address] [facility] CARE COORDINATOR. I will miss working with you, but I am leaving you in good hands. I will be transitioning your care to KWAME Winn, Care Coordinator, who will continue to work in conjunction with Nuria Gale MD to support you in reaching your health care goals. She will continue to work with you towards your plan of care. You can contact her by phone at [phone]. If you experience any concerns or questions related to your plan of care or condition, you can contact your new RN Care Coordinator or the Ariela Anne at [phone] . Please continue to call your doctor's office for appointments and medication refills and after office hours or on the weekend with any urgent issues. The staff will direct you on appropriate actions. Again, thank you for allowing me to participate in your care.     In good health,     Love Dowd RN

## 2018-08-13 NOTE — CARE COORDINATION
Patient called for f/u IP d/c 6/27 dx: Acute ischemic event, R MCA territory s/p tpa (6/24/18), DM, type II, not on insulin, Hyperlipidemia, LATISHA, Morbid Obesity, Erectile Dysfunction  - Message left on patients phone asking how he is doing, if he has any needs or questions and asked that he call back at their earliest convenience with contact information given.     Patient mailed letter about new care coordinator coming to his office

## 2018-08-28 ENCOUNTER — CARE COORDINATION (OUTPATIENT)
Dept: CARE COORDINATION | Age: 68
End: 2018-08-28

## 2018-09-10 ENCOUNTER — HOSPITAL ENCOUNTER (OUTPATIENT)
Age: 68
Discharge: HOME | End: 2018-09-10
Payer: MEDICARE

## 2018-09-10 ENCOUNTER — CARE COORDINATION (OUTPATIENT)
Dept: CARE COORDINATION | Age: 68
End: 2018-09-10

## 2018-09-10 ENCOUNTER — OFFICE VISIT (OUTPATIENT)
Dept: INTERNAL MEDICINE | Age: 68
End: 2018-09-10
Payer: MEDICARE

## 2018-09-10 VITALS
DIASTOLIC BLOOD PRESSURE: 71 MMHG | HEIGHT: 74 IN | BODY MASS INDEX: 40.43 KG/M2 | RESPIRATION RATE: 16 BRPM | WEIGHT: 315 LBS | TEMPERATURE: 98.8 F | HEART RATE: 73 BPM | SYSTOLIC BLOOD PRESSURE: 124 MMHG

## 2018-09-10 DIAGNOSIS — E11.69 DIABETES MELLITUS TYPE 2 IN OBESE (HCC): Chronic | ICD-10-CM

## 2018-09-10 DIAGNOSIS — E66.9 DIABETES MELLITUS TYPE 2 IN OBESE (HCC): Chronic | ICD-10-CM

## 2018-09-10 DIAGNOSIS — N39.8 VOIDING DYSFUNCTION: Chronic | ICD-10-CM

## 2018-09-10 LAB — HBA1C MFR BLD: 6.9 % (ref 4–5.6)

## 2018-09-10 PROCEDURE — G8598 ASA/ANTIPLAT THER USED: HCPCS | Performed by: INTERNAL MEDICINE

## 2018-09-10 PROCEDURE — 36415 COLL VENOUS BLD VENIPUNCTURE: CPT

## 2018-09-10 PROCEDURE — 3017F COLORECTAL CA SCREEN DOC REV: CPT | Performed by: INTERNAL MEDICINE

## 2018-09-10 PROCEDURE — 99213 OFFICE O/P EST LOW 20 MIN: CPT | Performed by: INTERNAL MEDICINE

## 2018-09-10 PROCEDURE — 2022F DILAT RTA XM EVC RTNOPTHY: CPT | Performed by: INTERNAL MEDICINE

## 2018-09-10 PROCEDURE — 3044F HG A1C LEVEL LT 7.0%: CPT | Performed by: INTERNAL MEDICINE

## 2018-09-10 PROCEDURE — 83036 HEMOGLOBIN GLYCOSYLATED A1C: CPT

## 2018-09-10 PROCEDURE — 1123F ACP DISCUSS/DSCN MKR DOCD: CPT | Performed by: INTERNAL MEDICINE

## 2018-09-10 PROCEDURE — G8417 CALC BMI ABV UP PARAM F/U: HCPCS | Performed by: INTERNAL MEDICINE

## 2018-09-10 PROCEDURE — 99212 OFFICE O/P EST SF 10 MIN: CPT | Performed by: INTERNAL MEDICINE

## 2018-09-10 PROCEDURE — 1101F PT FALLS ASSESS-DOCD LE1/YR: CPT | Performed by: INTERNAL MEDICINE

## 2018-09-10 PROCEDURE — 4004F PT TOBACCO SCREEN RCVD TLK: CPT | Performed by: INTERNAL MEDICINE

## 2018-09-10 PROCEDURE — G8427 DOCREV CUR MEDS BY ELIG CLIN: HCPCS | Performed by: INTERNAL MEDICINE

## 2018-09-10 PROCEDURE — 4040F PNEUMOC VAC/ADMIN/RCVD: CPT | Performed by: INTERNAL MEDICINE

## 2018-09-10 RX ORDER — TERAZOSIN 1 MG/1
CAPSULE ORAL
Qty: 30 CAPSULE | Refills: 3 | Status: SHIPPED | OUTPATIENT
Start: 2018-09-10 | End: 2018-11-13 | Stop reason: ALTCHOICE

## 2018-09-10 RX ORDER — MECLIZINE HYDROCHLORIDE 25 MG/1
25 TABLET ORAL DAILY PRN
Qty: 30 TABLET | Refills: 3 | Status: SHIPPED | OUTPATIENT
Start: 2018-09-10 | End: 2019-05-03 | Stop reason: SDUPTHER

## 2018-09-10 RX ORDER — SILDENAFIL CITRATE 20 MG/1
TABLET ORAL
Qty: 10 TABLET | Refills: 0 | Status: SHIPPED | OUTPATIENT
Start: 2018-09-10 | End: 2018-11-13 | Stop reason: ALTCHOICE

## 2018-09-10 RX ORDER — METFORMIN HYDROCHLORIDE 750 MG/1
750 TABLET, EXTENDED RELEASE ORAL
Qty: 30 TABLET | Refills: 2 | Status: SHIPPED | OUTPATIENT
Start: 2018-09-10 | End: 2018-10-22 | Stop reason: SDUPTHER

## 2018-09-10 RX ORDER — GLIPIZIDE 10 MG/1
10 TABLET ORAL 2 TIMES DAILY
Qty: 60 TABLET | Refills: 3 | Status: SHIPPED | OUTPATIENT
Start: 2018-09-10 | End: 2018-10-22 | Stop reason: ALTCHOICE

## 2018-09-10 RX ORDER — SILDENAFIL CITRATE 20 MG/1
TABLET ORAL
Qty: 10 TABLET | Refills: 0 | Status: SHIPPED | OUTPATIENT
Start: 2018-09-10 | End: 2018-09-10

## 2018-09-10 ASSESSMENT — ENCOUNTER SYMPTOMS
CONSTIPATION: 0
EYE DISCHARGE: 0
SORE THROAT: 0
NAUSEA: 0
EYE PAIN: 0
COUGH: 0
VOMITING: 0
ORTHOPNEA: 0
HEARTBURN: 0
DIARRHEA: 0
ABDOMINAL PAIN: 0

## 2018-09-10 NOTE — PATIENT INSTRUCTIONS
you can lose your balance and fall. · Talk to your doctor if you have numbness in your feet. Preventing falls at home  · Remove raised doorway thresholds, throw rugs, and clutter. Repair loose carpet or raised areas in the floor. · Move furniture and electrical cords to keep them out of walking paths. · Use nonskid floor wax, and wipe up spills right away, especially on ceramic tile floors. · If you use a walker or cane, put rubber tips on it. If you use crutches, clean the bottoms of them regularly with an abrasive pad, such as steel wool. · Keep your house well lit, especially Arleen Golas, and outside walkways. Use night-lights in areas such as hallways and bathrooms. Add extra light switches or use remote switches (such as switches that go on or off when you clap your hands) to make it easier to turn lights on if you have to get up during the night. · Install sturdy handrails on stairways. · Move items in your cabinets so that the things you use a lot are on the lower shelves (about waist level). · Keep a cordless phone and a flashlight with new batteries by your bed. If possible, put a phone in each of the main rooms of your house, or carry a cell phone in case you fall and cannot reach a phone. Or, you can wear a device around your neck or wrist. You push a button that sends a signal for help. · Wear low-heeled shoes that fit well and give your feet good support. Use footwear with nonskid soles. Check the heels and soles of your shoes for wear. Repair or replace worn heels or soles. · Do not wear socks without shoes on wood floors. · Walk on the grass when the sidewalks are slippery. If you live in an area that gets snow and ice in the winter, sprinkle salt on slippery steps and sidewalks. Preventing falls in the bath  · Install grab bars and nonskid mats inside and outside your shower or tub and near the toilet and sinks. · Use shower chairs and bath benches.   · Use a hand-held shower head and blood vessels. · Within 12 hours, the level of carbon monoxide in your blood drops back to normal. That makes room for more oxygen. With more oxygen in your body, you may notice that you have more energy than when you smoked. After 2 weeks  · Your lungs start to work better. · Your risk of heart attack starts to drop. After 1 month  · When your lungs are clear, you cough less and breathe deeper, so it's easier to be active. · Your sense of taste and smell return. That means you can enjoy food more than you have since you started smoking. Over the years  · After 1 year, your risk of heart disease is half what it would be if you kept smoking. · After 5 years, your risk of stroke starts to shrink. Within a few years after that, it's about the same as if you'd never smoked. · After 10 years, your risk of dying from lung cancer is cut by about half. And your risk for many other types of cancer is lower too. How would quitting help others in your life? When you quit smoking, you improve the health of everyone who now breathes in your smoke. · Their heart, lung, and cancer risks drop, much like yours. · They are sick less. For babies and small children, living smoke-free means they're less likely to have ear infections, pneumonia, and bronchitis. · If you're a woman who is or will be pregnant someday, quitting smoking means a healthier . · Children who are close to you are less likely to become adult smokers. Where can you learn more? Go to https://[employer]. org and sign in to your [employer] account. Enter [phone] in the [facility] box to learn more about \"Learning About Benefits From Quitting Smoking. \"     If you do not have an account, please click on the \"Sign Up Now\" link. Current as of: 2017  Content Version: 11.7  © 3729-4017 [employer]. Care instructions adapted under license by [facility] ([facility]).  If you have questions about a medical

## 2018-09-10 NOTE — PROGRESS NOTES
[address]  Internal Medicine Clinic    Attending Physician Statement:  Jennifer Leon M.D., F.A.C.P. I have discussed the case, including pertinent history and exam findings with the resident. I agree with the assessment, plan and orders as documented by the resident. Patient is seen for fu visit today. Last office notes reviewed, relative labs and imaging. Health maintenance issues of vaccinations, depression screening, tobacco cessation etc... covered  Sp CVA, residual weakness  Erectile dysfunction, discussed options, cheapest version-- will have to pay out of pocket     DM2-- cant' afford gliptan, inc metformin if gi tolerates, hx of gi issues in past-- trial 750 xr instead (Good Rx $20 - tow month supply)  (+glipizide on fu -- if BS elevated and NOT tolerated)  aic 6.9  /71   Pulse 73   Temp 98.8 °F (37.1 °C) (Oral)   Resp 16   Ht 6' 2\" (1.88 m)   Wt (!) 369 lb 3.2 oz (167.5 kg)   BMI 47.40 kg/m²   bp stable  Remainder of medical problems as per resident note.

## 2018-09-10 NOTE — PROGRESS NOTES
Discharge instructions reviewed with patient per Dr. Kevin Sauer. Patient directed to  to  AVS and schedule follow up appt.

## 2018-09-10 NOTE — CARE COORDINATION
CR tablet 80 mg in am; 60 mg in pm-[facility].     Historical Provider, MD       Future Appointments  Date Time Provider Daniela Lavonne   9/17/2018 12:00 PM Robyn Toney MD [facility] CARDIO [facility]   10/4/2018 10:40 AM KVNG Garsia - CNP [facility] NEURO [facility]   1/3/2019 10:00 AM Lionel Santana MD [facility]

## 2018-09-10 NOTE — PROGRESS NOTES
[address]  Internal Medicine Residency Program  [facility] Note      SUBJECTIVE:  CC: had concerns including 3 Month Follow-Up (Check Up, Diabetes) and Discuss Labs (A1C Check 09/10/18). Bear Romo presented to the [facility] for a routine visit. Mr. Elvi Adams a 73 yo male with PMH of CVA s/p tpa (6/2018), DM, BPH, erectile dysfunction and lumbar radiculopathy. He comes in for routine follow up today. Says he tries to incorporate more fruits and vegetables to his diet, however continues to smoke as much as 12 cigarettes a day and drinks alcohol a few times a week. He is very limited in exercising because of back pain. Still with some weakness of the R hand since CVA, but otherwise no problem with ambulation, no incontinence. Denies chest pain, palpitations, abdominal pain, diarrhea. Review of Systems   Constitutional: Negative for chills, fever, malaise/fatigue and weight loss. HENT: Negative for congestion, ear discharge and sore throat. Eyes: Negative for pain and discharge. Respiratory: Negative for cough. Cardiovascular: Negative for chest pain, palpitations, orthopnea and claudication. Gastrointestinal: Negative for abdominal pain, constipation, diarrhea, heartburn, nausea and vomiting. Genitourinary: Negative for dysuria and frequency. Musculoskeletal: Negative for falls and joint pain. Skin: Negative for itching and rash. Neurological: Negative for focal weakness and headaches.        Current Outpatient Prescriptions on File Prior to Visit   Medication Sig Dispense Refill    atorvastatin (LIPITOR) 40 MG tablet Take 1 tablet by mouth nightly 30 tablet 2    metFORMIN (GLUCOPHAGE) 1000 MG tablet Take 0.5 tablets by mouth 2 times daily (with meals) 30 tablet 2    aspirin 81 MG tablet Take 1 tablet by mouth daily Last dose 4/26/15 30 tablet 3    terazosin (HYTRIN) 1 MG capsule Take 1 tab at night 30 capsule 3    meclizine (ANTIVERT) 25 MG tablet Take 1 tablet by mouth daily as needed for Dizziness 30 tablet 3    Lancets MISC Use to test blood sugar twice daily 100 each 3    glucose blood VI test strips (EXACTECH TEST) strip Use to test blood sugars twice daily, morning and evening before eating meals. Diag 250.00 provide brand covered by insurance 100 each 3    Blood Glucose Monitoring Suppl ARMANDO One glucose meter per insurance coverage, measure blood sugar twice daily morning and evening before eating meals. Diag 250.00 1 Device 0    oxyCODONE (ROXICODONE) 5 MG immediate release tablet   Take 5 mg by mouth 3 times daily [facility]      oxycodone (OXYCONTIN) 40 MG CR tablet 80 mg in am; 60 mg in pm-[facility].  nicotine polacrilex (NICORETTE) 2 MG gum Take 1 each by mouth as needed for Smoking cessation Maximum dose: 24 pieces/24 hours. 110 each 1     No current facility-administered medications on file prior to visit. OBJECTIVE:    VS: /71   Pulse 73   Temp 98.8 °F (37.1 °C) (Oral)   Resp 16   Ht 6' 2\" (1.88 m)   Wt (!) 369 lb 3.2 oz (167.5 kg)   BMI 47.40 kg/m²   Physical Exam   Constitutional: He is oriented to person, place, and time. HENT:   Head: Atraumatic. Eyes: Pupils are equal, round, and reactive to light. Conjunctivae are normal.   Cardiovascular: Normal rate and regular rhythm. No murmur heard. Pulmonary/Chest: Effort normal and breath sounds normal. No respiratory distress. Abdominal: Soft. Bowel sounds are normal. He exhibits no distension. Musculoskeletal: He exhibits no edema. Lymphadenopathy:     He has no cervical adenopathy. Neurological: He is alert and oriented to person, place, and time. Decrease  strength right       ASSESSMENT/PLAN:    I have reviewed all pertient PMHx, PSHx, FamHx, Social Hx, medications, and allergies and updated history as appropriate.     Acute ischemic event, R MCA territory s/p tpa (6/24/18)  - Has been referred to cardiology by

## 2018-09-17 ENCOUNTER — OFFICE VISIT (OUTPATIENT)
Dept: CARDIOLOGY CLINIC | Age: 68
End: 2018-09-17
Payer: MEDICARE

## 2018-09-17 ENCOUNTER — NURSE ONLY (OUTPATIENT)
Dept: CARDIOLOGY CLINIC | Age: 68
End: 2018-09-17

## 2018-09-17 VITALS
BODY MASS INDEX: 40.43 KG/M2 | HEART RATE: 77 BPM | RESPIRATION RATE: 14 BRPM | HEIGHT: 74 IN | DIASTOLIC BLOOD PRESSURE: 82 MMHG | SYSTOLIC BLOOD PRESSURE: 132 MMHG | WEIGHT: 315 LBS

## 2018-09-17 DIAGNOSIS — E66.01 MORBID OBESITY (HCC): Chronic | ICD-10-CM

## 2018-09-17 DIAGNOSIS — I63.9 ACUTE CVA (CEREBROVASCULAR ACCIDENT) (HCC): ICD-10-CM

## 2018-09-17 DIAGNOSIS — R00.2 PALPITATIONS: Primary | ICD-10-CM

## 2018-09-17 PROCEDURE — 1101F PT FALLS ASSESS-DOCD LE1/YR: CPT | Performed by: INTERNAL MEDICINE

## 2018-09-17 PROCEDURE — G8598 ASA/ANTIPLAT THER USED: HCPCS | Performed by: INTERNAL MEDICINE

## 2018-09-17 PROCEDURE — 99204 OFFICE O/P NEW MOD 45 MIN: CPT | Performed by: INTERNAL MEDICINE

## 2018-09-17 PROCEDURE — 93000 ELECTROCARDIOGRAM COMPLETE: CPT | Performed by: INTERNAL MEDICINE

## 2018-09-17 PROCEDURE — 1123F ACP DISCUSS/DSCN MKR DOCD: CPT | Performed by: INTERNAL MEDICINE

## 2018-09-17 PROCEDURE — G8427 DOCREV CUR MEDS BY ELIG CLIN: HCPCS | Performed by: INTERNAL MEDICINE

## 2018-09-17 PROCEDURE — 3017F COLORECTAL CA SCREEN DOC REV: CPT | Performed by: INTERNAL MEDICINE

## 2018-09-17 PROCEDURE — G8417 CALC BMI ABV UP PARAM F/U: HCPCS | Performed by: INTERNAL MEDICINE

## 2018-09-17 PROCEDURE — 4040F PNEUMOC VAC/ADMIN/RCVD: CPT | Performed by: INTERNAL MEDICINE

## 2018-09-17 PROCEDURE — 4004F PT TOBACCO SCREEN RCVD TLK: CPT | Performed by: INTERNAL MEDICINE

## 2018-09-18 NOTE — PROGRESS NOTES
essentially normal.  MRI, however, revealed a left cortical  Infarct. Etiology of his stroke is, however, unclear. No AF was demonstrated on the monitor during the hospitalization. CTA of head/neck revealed no hemodynamically significant carotid stenosis. He has no prior history of AF; however, he does have diagnosis of obstructive sleep apnea and is not compliant with CPAP therapy. AF could therefore be in the differential.  To look for evidence of occult atrial fibrillation, a 14-day event monitor will be placed. Bubble study on the transthoracic echo was negative. However, in light of possibly cryptogenic nature of the embolus as well as significant neurologic event, an outpatient THIERNO will be scheduled to look for PFO or left atrial thrombus. Rationale for the procedure, description of the procedure and its risks and benefits were reviewed with the patient in detail. He is willing to proceed. For now, medical therapy will be continued as follows:  nicotine polacrilex (NICORETTE) 2 MG gum Take 1 each by mouth as needed for Smoking cessation Maximum dose: 24 pieces/24 hours. aspirin 81 MG tablet Take 1 tablet by mouth daily Last dose 4/26/15   terazosin (HYTRIN) 1 MG capsule Take 1 tab at night   meclizine (ANTIVERT) 25 MG tablet Take 1 tablet by mouth daily as needed for Dizziness   blood glucose test strips (EXACTECH TEST) strip Use to test blood sugars twice daily, morning and evening before eating meals. Diag 250.00 provide brand covered by insurance   metFORMIN (GLUCOPHAGE-XR) 750 MG extended release tablet Take 1 tablet by mouth daily (with breakfast)   glipiZIDE (GLUCOTROL) 10 MG tablet Take 1 tablet by mouth 2 times daily   metFORMIN (GLUCOPHAGE) 500 MG tablet Take 1 tablet at night.   sildenafil (REVATIO) 20 MG tablet Take 3 tablets 1 hour prior to sexual intercourse.    atorvastatin (LIPITOR) 40 MG tablet Take 1 tablet by mouth nightly   metFORMIN (GLUCOPHAGE) 1000 MG tablet Take 0.5

## 2018-09-25 NOTE — PROGRESS NOTES
[facility] PRE-ADMISSION TESTING INSTRUCTIONS    The Preadmission Testing patient is instructed accordingly using the following criteria (check applicable):    ARRIVAL INSTRUCTIONS:  [x] Parking the day of Surgery is located in the Main Entrance lot. Upon entering the door, make an immediate right to the surgery reception desk    [x] Bring photo ID and insurance card    [] Bring in a copy of Living will or Durable Power of  papers. [x] Please be sure to arrange transportation to and from the hospital    [x] Please arrange for someone to be with you the remainder of the day due to having anesthesia      GENERAL INSTRUCTIONS:    [x] Nothing by mouth after midnight, including gum, candy, mints or water    [x] You may brush your teeth, but do not swallow any water    [x] Take medications as instructed with 1-2 oz of water- instructed to take all AM medications pre-op as per Dr. Kevin Sahu instructions     [x] Stop herbal supplements and vitamins 5 days prior to procedure    [x] Follow preop dosing of blood thinners per physician instructions    [x] Do not take insulin or oral diabetic medications    [x] If diabetic and have low blood sugar or feel symptomatic, take 1-2oz apple juice or glucose tablets    [] Bring inhalers day of surgery    [] Bring C-PAP/ Bi-Pap day of surgery    [] Bring urine specimen day of surgery    [x] Antibacterial Soap shower or bath AM of Surgery, no lotion, powders or creams to surgical site    [] Follow bowel prep as instructed per surgeon    [x] No tobacco products within 24 hours of surgery     [x] No alcohol or illegal drug use within 24 hours of surgery.     [x] Jewelry, body piercing's, eyeglasses, contact lenses and dentures are not permitted into surgery (bring cases)      [] Please do not wear any nail polish or make up on the day of surgery    [x] If not already done, you can expect a call from registration    [x] If surgeon requests a time

## 2018-10-01 ENCOUNTER — ANESTHESIA (OUTPATIENT)
Dept: NON INVASIVE DIAGNOSTICS | Age: 68
End: 2018-10-01

## 2018-10-01 ENCOUNTER — HOSPITAL ENCOUNTER (OUTPATIENT)
Dept: NON INVASIVE DIAGNOSTICS | Age: 68
Discharge: HOME | End: 2018-10-01
Payer: MEDICARE

## 2018-10-01 ENCOUNTER — ANESTHESIA EVENT (OUTPATIENT)
Dept: NON INVASIVE DIAGNOSTICS | Age: 68
End: 2018-10-01

## 2018-10-01 VITALS
TEMPERATURE: 97.5 F | DIASTOLIC BLOOD PRESSURE: 85 MMHG | HEART RATE: 63 BPM | HEIGHT: 74 IN | SYSTOLIC BLOOD PRESSURE: 116 MMHG | BODY MASS INDEX: 40.43 KG/M2 | WEIGHT: 315 LBS | RESPIRATION RATE: 18 BRPM | OXYGEN SATURATION: 98 %

## 2018-10-01 VITALS
OXYGEN SATURATION: 99 % | DIASTOLIC BLOOD PRESSURE: 78 MMHG | SYSTOLIC BLOOD PRESSURE: 114 MMHG | RESPIRATION RATE: 29 BRPM

## 2018-10-01 LAB — METER GLUCOSE: 155 MG/DL (ref 70–110)

## 2018-10-01 PROCEDURE — 7100000010 HC PHASE II RECOVERY - FIRST 15 MIN

## 2018-10-01 PROCEDURE — 82962 GLUCOSE BLOOD TEST: CPT

## 2018-10-01 PROCEDURE — 6360000002 HC RX W HCPCS

## 2018-10-01 PROCEDURE — 3700000000 HC ANESTHESIA ATTENDED CARE

## 2018-10-01 PROCEDURE — 93312 ECHO TRANSESOPHAGEAL: CPT

## 2018-10-01 PROCEDURE — 2580000003 HC RX 258: Performed by: NURSE ANESTHETIST, CERTIFIED REGISTERED

## 2018-10-01 PROCEDURE — 7100000011 HC PHASE II RECOVERY - ADDTL 15 MIN

## 2018-10-01 PROCEDURE — 93320 DOPPLER ECHO COMPLETE: CPT

## 2018-10-01 PROCEDURE — 3700000001 HC ADD 15 MINUTES (ANESTHESIA)

## 2018-10-01 PROCEDURE — 93325 DOPPLER ECHO COLOR FLOW MAPG: CPT

## 2018-10-01 PROCEDURE — 6360000002 HC RX W HCPCS: Performed by: NURSE ANESTHETIST, CERTIFIED REGISTERED

## 2018-10-01 RX ORDER — SODIUM CHLORIDE 9 MG/ML
INJECTION, SOLUTION INTRAVENOUS CONTINUOUS PRN
Status: DISCONTINUED | OUTPATIENT
Start: 2018-10-01 | End: 2018-10-01 | Stop reason: SDUPTHER

## 2018-10-01 RX ORDER — PROPOFOL 10 MG/ML
INJECTION, EMULSION INTRAVENOUS PRN
Status: DISCONTINUED | OUTPATIENT
Start: 2018-10-01 | End: 2018-10-01 | Stop reason: SDUPTHER

## 2018-10-01 RX ADMIN — SODIUM CHLORIDE: 9 INJECTION, SOLUTION INTRAVENOUS at 10:23

## 2018-10-01 RX ADMIN — PROPOFOL 100 MG: 10 INJECTION, EMULSION INTRAVENOUS at 10:39

## 2018-10-01 RX ADMIN — PROPOFOL 10 MG: 10 INJECTION, EMULSION INTRAVENOUS at 10:53

## 2018-10-01 RX ADMIN — PROPOFOL 100 MG: 10 INJECTION, EMULSION INTRAVENOUS at 10:44

## 2018-10-01 ASSESSMENT — LIFESTYLE VARIABLES: SMOKING_STATUS: 1

## 2018-10-01 ASSESSMENT — PAIN SCALES - GENERAL
PAINLEVEL_OUTOF10: 0

## 2018-10-01 NOTE — PROGRESS NOTES
Dr. Dante Hackett spoke with patient and wife at bedside. Discharge instructions given- both verbalize understanding. IV removed.

## 2018-10-02 NOTE — ANESTHESIA POSTPROCEDURE EVALUATION
Department of Anesthesiology  Postprocedure Note    Patient: Iglesia Wheat  MRN: [id number]  YOB: 1950  Date of evaluation: 10/1/2018  Time:  8:25 PM     Procedure Summary     Date:  10/01/18 Room / Location:  [facility]    Anesthesia Start:  1023 Anesthesia Stop:  1100    Procedure:  TRANSESOPHAGEAL ECHO Diagnosis:      Scheduled Providers:   Responsible Provider:  Olga Diaz MD    Anesthesia Type:  MAC ASA Status:  4          Anesthesia Type: MAC    Trace Phase I: Trace Score: 10    Trace Phase II: Trace Score: 10    Last vitals: Reviewed and per EMR flowsheets.        Anesthesia Post Evaluation    Patient location during evaluation: PACU  Patient participation: complete - patient participated  Level of consciousness: awake and alert  Airway patency: patent  Nausea & Vomiting: no vomiting and no nausea  Complications: no  Cardiovascular status: hemodynamically stable  Respiratory status: acceptable  Hydration status: stable

## 2018-10-05 ENCOUNTER — TELEPHONE (OUTPATIENT)
Dept: ICU | Age: 68
End: 2018-10-05

## 2018-10-05 ENCOUNTER — CARE COORDINATION (OUTPATIENT)
Dept: CARE COORDINATION | Age: 68
End: 2018-10-05

## 2018-10-08 ENCOUNTER — TELEPHONE (OUTPATIENT)
Dept: ADMINISTRATIVE | Age: 68
End: 2018-10-08

## 2018-10-08 ENCOUNTER — CARE COORDINATION (OUTPATIENT)
Dept: CARE COORDINATION | Age: 68
End: 2018-10-08

## 2018-10-09 ENCOUNTER — TELEPHONE (OUTPATIENT)
Dept: CARDIOLOGY CLINIC | Age: 68
End: 2018-10-09

## 2018-10-11 NOTE — CARE COORDINATION
Received call in from 1981 Crispin Wallis that she would be glad to assist patient with Medicare Sign up when he is ready to meet. Said that I can give patient the number that I called for follow up and she will follow up with him. States that did talk with him last year but no follow up appointmnt was made.

## 2018-10-19 ENCOUNTER — CARE COORDINATION (OUTPATIENT)
Dept: CARE COORDINATION | Age: 68
End: 2018-10-19

## 2018-10-19 NOTE — CARE COORDINATION
Patient called for f/u PT and financial assistance  - message left asking if he got my message and asked if he could call me back as it is now open enrollment for Medicare and have important information about assistance with his hospital bills.

## 2018-10-22 ENCOUNTER — OFFICE VISIT (OUTPATIENT)
Dept: CARDIOLOGY CLINIC | Age: 68
End: 2018-10-22
Payer: MEDICARE

## 2018-10-22 ENCOUNTER — CARE COORDINATION (OUTPATIENT)
Dept: CARE COORDINATION | Age: 68
End: 2018-10-22

## 2018-10-22 VITALS
HEART RATE: 95 BPM | HEIGHT: 74 IN | BODY MASS INDEX: 40.43 KG/M2 | WEIGHT: 315 LBS | RESPIRATION RATE: 16 BRPM | SYSTOLIC BLOOD PRESSURE: 136 MMHG | OXYGEN SATURATION: 96 % | DIASTOLIC BLOOD PRESSURE: 60 MMHG

## 2018-10-22 DIAGNOSIS — E11.69 DIABETES MELLITUS TYPE 2 IN OBESE (HCC): Chronic | ICD-10-CM

## 2018-10-22 DIAGNOSIS — E66.9 DIABETES MELLITUS TYPE 2 IN OBESE (HCC): Chronic | ICD-10-CM

## 2018-10-22 DIAGNOSIS — I63.9 ACUTE CVA (CEREBROVASCULAR ACCIDENT) (HCC): Primary | ICD-10-CM

## 2018-10-22 DIAGNOSIS — Q21.12 PFO (PATENT FORAMEN OVALE): ICD-10-CM

## 2018-10-22 PROCEDURE — 3044F HG A1C LEVEL LT 7.0%: CPT | Performed by: INTERNAL MEDICINE

## 2018-10-22 PROCEDURE — 2022F DILAT RTA XM EVC RTNOPTHY: CPT | Performed by: INTERNAL MEDICINE

## 2018-10-22 PROCEDURE — G8427 DOCREV CUR MEDS BY ELIG CLIN: HCPCS | Performed by: INTERNAL MEDICINE

## 2018-10-22 PROCEDURE — 1101F PT FALLS ASSESS-DOCD LE1/YR: CPT | Performed by: INTERNAL MEDICINE

## 2018-10-22 PROCEDURE — G8484 FLU IMMUNIZE NO ADMIN: HCPCS | Performed by: INTERNAL MEDICINE

## 2018-10-22 PROCEDURE — 4040F PNEUMOC VAC/ADMIN/RCVD: CPT | Performed by: INTERNAL MEDICINE

## 2018-10-22 PROCEDURE — 3017F COLORECTAL CA SCREEN DOC REV: CPT | Performed by: INTERNAL MEDICINE

## 2018-10-22 PROCEDURE — 99214 OFFICE O/P EST MOD 30 MIN: CPT | Performed by: INTERNAL MEDICINE

## 2018-10-22 PROCEDURE — 4004F PT TOBACCO SCREEN RCVD TLK: CPT | Performed by: INTERNAL MEDICINE

## 2018-10-22 PROCEDURE — 1123F ACP DISCUSS/DSCN MKR DOCD: CPT | Performed by: INTERNAL MEDICINE

## 2018-10-22 PROCEDURE — G8417 CALC BMI ABV UP PARAM F/U: HCPCS | Performed by: INTERNAL MEDICINE

## 2018-10-22 PROCEDURE — G8598 ASA/ANTIPLAT THER USED: HCPCS | Performed by: INTERNAL MEDICINE

## 2018-10-22 RX ORDER — METFORMIN HYDROCHLORIDE 750 MG/1
750 TABLET, EXTENDED RELEASE ORAL
Qty: 30 TABLET | Refills: 1 | Status: SHIPPED | OUTPATIENT
Start: 2018-10-22 | End: 2018-10-22 | Stop reason: ALTCHOICE

## 2018-11-05 DIAGNOSIS — E66.01 MORBID OBESITY (HCC): Chronic | ICD-10-CM

## 2018-11-05 DIAGNOSIS — I63.9 ACUTE CVA (CEREBROVASCULAR ACCIDENT) (HCC): ICD-10-CM

## 2018-11-05 DIAGNOSIS — R00.2 PALPITATIONS: ICD-10-CM

## 2018-11-08 ENCOUNTER — CARE COORDINATION (OUTPATIENT)
Dept: CARE COORDINATION | Age: 68
End: 2018-11-08

## 2018-11-13 ENCOUNTER — HOSPITAL ENCOUNTER (OUTPATIENT)
Age: 68
Setting detail: OBSERVATION
Discharge: HOME | End: 2018-11-15
Attending: EMERGENCY MEDICINE | Admitting: INTERNAL MEDICINE
Payer: MEDICARE

## 2018-11-13 ENCOUNTER — APPOINTMENT (OUTPATIENT)
Dept: GENERAL RADIOLOGY | Age: 68
End: 2018-11-13
Payer: MEDICARE

## 2018-11-13 ENCOUNTER — APPOINTMENT (OUTPATIENT)
Dept: CT IMAGING | Age: 68
End: 2018-11-13
Payer: MEDICARE

## 2018-11-13 DIAGNOSIS — R00.2 PALPITATIONS: ICD-10-CM

## 2018-11-13 DIAGNOSIS — G45.9 TIA (TRANSIENT ISCHEMIC ATTACK): ICD-10-CM

## 2018-11-13 DIAGNOSIS — H54.7 VISION PROBLEM: Primary | ICD-10-CM

## 2018-11-13 DIAGNOSIS — I63.9 ACUTE CVA (CEREBROVASCULAR ACCIDENT) (HCC): ICD-10-CM

## 2018-11-13 LAB
ALBUMIN SERPL-MCNC: 4.1 G/DL (ref 3.5–5.2)
ALP BLD-CCNC: 86 U/L (ref 40–129)
ALT SERPL-CCNC: 16 U/L (ref 0–40)
ANION GAP SERPL CALCULATED.3IONS-SCNC: 10 MMOL/L (ref 7–16)
AST SERPL-CCNC: 13 U/L (ref 0–39)
BASOPHILS ABSOLUTE: 0.04 E9/L (ref 0–0.2)
BASOPHILS RELATIVE PERCENT: 0.5 % (ref 0–2)
BILIRUB SERPL-MCNC: 0.4 MG/DL (ref 0–1.2)
BUN BLDV-MCNC: 10 MG/DL (ref 8–23)
CALCIUM SERPL-MCNC: 9.3 MG/DL (ref 8.6–10.2)
CHLORIDE BLD-SCNC: 102 MMOL/L (ref 98–107)
CO2: 28 MMOL/L (ref 22–29)
CREAT SERPL-MCNC: 1 MG/DL (ref 0.7–1.2)
EOSINOPHILS ABSOLUTE: 0.08 E9/L (ref 0.05–0.5)
EOSINOPHILS RELATIVE PERCENT: 1.1 % (ref 0–6)
GFR AFRICAN AMERICAN: >60
GFR NON-AFRICAN AMERICAN: >60 ML/MIN/1.73
GLUCOSE BLD-MCNC: 141 MG/DL (ref 74–99)
HCT VFR BLD CALC: 40.6 % (ref 37–54)
HEMOGLOBIN: 12.4 G/DL (ref 12.5–16.5)
IMMATURE GRANULOCYTES #: 0.02 E9/L
IMMATURE GRANULOCYTES %: 0.3 % (ref 0–5)
LYMPHOCYTES ABSOLUTE: 2.67 E9/L (ref 1.5–4)
LYMPHOCYTES RELATIVE PERCENT: 36.2 % (ref 20–42)
MCH RBC QN AUTO: 28.3 PG (ref 26–35)
MCHC RBC AUTO-ENTMCNC: 30.5 % (ref 32–34.5)
MCV RBC AUTO: 92.7 FL (ref 80–99.9)
METER GLUCOSE: 108 MG/DL (ref 74–99)
METER GLUCOSE: 121 MG/DL (ref 74–99)
MONOCYTES ABSOLUTE: 0.62 E9/L (ref 0.1–0.95)
MONOCYTES RELATIVE PERCENT: 8.4 % (ref 2–12)
NEUTROPHILS ABSOLUTE: 3.94 E9/L (ref 1.8–7.3)
NEUTROPHILS RELATIVE PERCENT: 53.5 % (ref 43–80)
PDW BLD-RTO: 12.2 FL (ref 11.5–15)
PLATELET # BLD: 308 E9/L (ref 130–450)
PMV BLD AUTO: 9 FL (ref 7–12)
POTASSIUM SERPL-SCNC: 4.9 MMOL/L (ref 3.5–5)
RBC # BLD: 4.38 E12/L (ref 3.8–5.8)
SODIUM BLD-SCNC: 140 MMOL/L (ref 132–146)
TOTAL PROTEIN: 8.1 G/DL (ref 6.4–8.3)
TROPONIN: <0.01 NG/ML (ref 0–0.03)
TSH SERPL DL<=0.05 MIU/L-ACNC: 1.97 UIU/ML (ref 0.27–4.2)
WBC # BLD: 7.4 E9/L (ref 4.5–11.5)

## 2018-11-13 PROCEDURE — 70450 CT HEAD/BRAIN W/O DYE: CPT

## 2018-11-13 PROCEDURE — 82962 GLUCOSE BLOOD TEST: CPT

## 2018-11-13 PROCEDURE — 6370000000 HC RX 637 (ALT 250 FOR IP): Performed by: EMERGENCY MEDICINE

## 2018-11-13 PROCEDURE — 96372 THER/PROPH/DIAG INJ SC/IM: CPT

## 2018-11-13 PROCEDURE — 71045 X-RAY EXAM CHEST 1 VIEW: CPT

## 2018-11-13 PROCEDURE — 80053 COMPREHEN METABOLIC PANEL: CPT

## 2018-11-13 PROCEDURE — 84443 ASSAY THYROID STIM HORMONE: CPT

## 2018-11-13 PROCEDURE — 36415 COLL VENOUS BLD VENIPUNCTURE: CPT

## 2018-11-13 PROCEDURE — 85025 COMPLETE CBC W/AUTO DIFF WBC: CPT

## 2018-11-13 PROCEDURE — 2580000003 HC RX 258: Performed by: INTERNAL MEDICINE

## 2018-11-13 PROCEDURE — 99285 EMERGENCY DEPT VISIT HI MDM: CPT

## 2018-11-13 PROCEDURE — G0378 HOSPITAL OBSERVATION PER HR: HCPCS

## 2018-11-13 PROCEDURE — 6360000002 HC RX W HCPCS: Performed by: INTERNAL MEDICINE

## 2018-11-13 PROCEDURE — 84484 ASSAY OF TROPONIN QUANT: CPT

## 2018-11-13 PROCEDURE — 6370000000 HC RX 637 (ALT 250 FOR IP): Performed by: INTERNAL MEDICINE

## 2018-11-13 RX ORDER — ASPIRIN 81 MG/1
324 TABLET, CHEWABLE ORAL ONCE
Status: COMPLETED | OUTPATIENT
Start: 2018-11-13 | End: 2018-11-13

## 2018-11-13 RX ORDER — SODIUM CHLORIDE 0.9 % (FLUSH) 0.9 %
10 SYRINGE (ML) INJECTION PRN
Status: DISCONTINUED | OUTPATIENT
Start: 2018-11-13 | End: 2018-11-15 | Stop reason: HOSPADM

## 2018-11-13 RX ORDER — OXYCODONE HCL 20 MG/1
60 TABLET, FILM COATED, EXTENDED RELEASE ORAL EVERY MORNING
Status: DISCONTINUED | OUTPATIENT
Start: 2018-11-14 | End: 2018-11-13

## 2018-11-13 RX ORDER — ONDANSETRON 2 MG/ML
4 INJECTION INTRAMUSCULAR; INTRAVENOUS EVERY 6 HOURS PRN
Status: DISCONTINUED | OUTPATIENT
Start: 2018-11-13 | End: 2018-11-15 | Stop reason: HOSPADM

## 2018-11-13 RX ORDER — OXYCODONE HCL 20 MG/1
60 TABLET, FILM COATED, EXTENDED RELEASE ORAL EVERY EVENING
Status: DISCONTINUED | OUTPATIENT
Start: 2018-11-13 | End: 2018-11-15 | Stop reason: HOSPADM

## 2018-11-13 RX ORDER — OXYCODONE HYDROCHLORIDE 5 MG/1
5 TABLET ORAL 3 TIMES DAILY PRN
Status: DISCONTINUED | OUTPATIENT
Start: 2018-11-13 | End: 2018-11-15 | Stop reason: HOSPADM

## 2018-11-13 RX ORDER — SODIUM CHLORIDE 0.9 % (FLUSH) 0.9 %
10 SYRINGE (ML) INJECTION EVERY 12 HOURS SCHEDULED
Status: DISCONTINUED | OUTPATIENT
Start: 2018-11-13 | End: 2018-11-15 | Stop reason: HOSPADM

## 2018-11-13 RX ORDER — ATORVASTATIN CALCIUM 40 MG/1
80 TABLET, FILM COATED ORAL NIGHTLY
Status: DISCONTINUED | OUTPATIENT
Start: 2018-11-13 | End: 2018-11-15 | Stop reason: HOSPADM

## 2018-11-13 RX ORDER — ASPIRIN 81 MG/1
81 TABLET, CHEWABLE ORAL DAILY
Status: DISCONTINUED | OUTPATIENT
Start: 2018-11-14 | End: 2018-11-14

## 2018-11-13 RX ORDER — OXYCODONE HCL 20 MG/1
80 TABLET, FILM COATED, EXTENDED RELEASE ORAL EVERY MORNING
Status: DISCONTINUED | OUTPATIENT
Start: 2018-11-14 | End: 2018-11-15 | Stop reason: HOSPADM

## 2018-11-13 RX ADMIN — ENOXAPARIN SODIUM 40 MG: 40 INJECTION SUBCUTANEOUS at 21:10

## 2018-11-13 RX ADMIN — ATORVASTATIN CALCIUM 80 MG: 40 TABLET, FILM COATED ORAL at 21:10

## 2018-11-13 RX ADMIN — ASPIRIN 81 MG CHEWABLE TABLET 324 MG: 81 TABLET CHEWABLE at 17:18

## 2018-11-13 RX ADMIN — Medication 10 ML: at 21:16

## 2018-11-13 ASSESSMENT — PAIN SCALES - GENERAL
PAINLEVEL_OUTOF10: 0

## 2018-11-13 ASSESSMENT — ENCOUNTER SYMPTOMS
SHORTNESS OF BREATH: 0
VOMITING: 0
DIARRHEA: 0
ABDOMINAL PAIN: 0
CHEST TIGHTNESS: 0
RHINORRHEA: 0
NAUSEA: 0
COUGH: 0
SORE THROAT: 0
BACK PAIN: 0

## 2018-11-13 NOTE — ED PROVIDER NOTES
Scale      Person Administering Scale: Tabatha Reza    Administer stroke scale items in the order listed. Record performance in each category after each subscale exam. Do not go back and change scores. Follow directions provided for each exam technique. Scores should reflect what the patient does, not what the clinician thinks the patient can do. The clinician should record answers while administering the exam and work quickly. Except where indicated, the patient should not be coached (i.e., repeated requests to patient to make a special effort). 1a  Level of consciousness: 0=alert; keenly responsive   1b. LOC questions:  0=Performs both tasks correctly   1c. LOC commands: 0=Performs both tasks correctly   2. Best Gaze: 0=normal   3. Visual: 0=No visual loss   4. Facial Palsy: 0=Normal symmetric movement   5a. Motor left arm: 0=No drift, limb holds 90 (or 45) degrees for full 10 seconds   5b. Motor right arm: 0=No drift, limb holds 90 (or 45) degrees for full 10 seconds   6a. motor left le=No drift, limb holds 90 (or 45) degrees for full 10 seconds   6b  Motor right le=No drift, limb holds 90 (or 45) degrees for full 10 seconds   7. Limb Ataxia: 0=Absent   8. Sensory: 0=Normal; no sensory loss   9. Best Language:  0=No aphasia, normal   10. Dysarthria: 0=Normal   11. Extinction and Inattention: 0=No abnormality   12. Distal motor function: 0=Normal    Total:   0       ED Course as of 1809   Tue 2018   1651 Dr. Ruslan Potts will admit. [CW]      ED Course User Index  [CW] Tabatha Reza, DO       ----------------------------------------------- PAST HISTORY --------------------------------------------  Past Medical History:  has a past medical history of Back pain; Cerebral artery occlusion with cerebral infarction ([facility] [address] Depression; Diabetes mellitus ([facility] [address] Hyperlipidemia; Sleep apnea; and Testicular mass.     Past Surgical History:  has a past surgical history that includes knee mmol/L    Chloride 102 98 - 107 mmol/L    CO2 28 22 - 29 mmol/L    Anion Gap 10 7 - 16 mmol/L    Glucose 141 (H) 74 - 99 mg/dL    BUN 10 8 - 23 mg/dL    CREATININE 1.0 0.7 - 1.2 mg/dL    GFR Non-African American >60 >=60 mL/min/1.73    GFR African American >60     Calcium 9.3 8.6 - 10.2 mg/dL    Total Protein 8.1 6.4 - 8.3 g/dL    Alb 4.1 3.5 - 5.2 g/dL    Total Bilirubin 0.4 0.0 - 1.2 mg/dL    Alkaline Phosphatase 86 40 - 129 U/L    ALT 16 0 - 40 U/L    AST 13 0 - 39 U/L   Troponin   Result Value Ref Range    Troponin <0.01 0.00 - 0.03 ng/mL   TSH without Reflex   Result Value Ref Range    TSH 1.970 0.270 - 4.200 uIU/mL   POCT Glucose   Result Value Ref Range    Meter Glucose 108 (H) 74 - 99 mg/dL   EKG 12 Lead   Result Value Ref Range    Ventricular Rate 67 BPM    Atrial Rate 67 BPM    P-R Interval 146 ms    QRS Duration 94 ms    Q-T Interval 416 ms    QTc Calculation (Bazett) 439 ms    P Axis 35 degrees    R Axis -36 degrees    T Axis 10 degrees       RADIOLOGY:    All Radiology results interpreted by Radiologist unless otherwise noted. XR CHEST PORTABLE   Final Result   No acute cardiopulmonary pathology evident. CT Head WO Contrast   Final Result   1. No acute intracranial hemorrhage, midline shift, or mass effect. 2. Multiple foci of remote stroke notably involving the posterior   circulation          EKG: This EKG is signed and interpreted by ED Physician. Time:  1101  Rate: 67  Rhythm: Sinus. Interpretation: NSR, no BELLA. Comparison: unchanged. ---------------------------- NURSING NOTES AND VITALS REVIEWED -------------------------   The nursing notes within the ED encounter and vital signs as below have been reviewed.    /68   Pulse 76   Temp 98.1 °F (36.7 °C) (Temporal)   Resp 12   Wt (!) 367 lb (166.5 kg)   SpO2 97%   BMI 47.12 kg/m²   Oxygen Saturation Interpretation: Normal      ------------------------------------------PROGRESS NOTES

## 2018-11-13 NOTE — ED NOTES
Visual acuity screening performed. Left eye 20/40.   Right eye 20/25     Heladio Correa RN  11/13/18 9450

## 2018-11-14 LAB
CHOLESTEROL, TOTAL: 97 MG/DL (ref 0–199)
HBA1C MFR BLD: 7 % (ref 4–5.6)
HDLC SERPL-MCNC: 40 MG/DL
LDL CHOLESTEROL CALCULATED: 36 MG/DL (ref 0–99)
METER GLUCOSE: 148 MG/DL (ref 74–99)
METER GLUCOSE: 155 MG/DL (ref 74–99)
SEDIMENTATION RATE, ERYTHROCYTE: 32 MM/HR (ref 0–15)
TRIGL SERPL-MCNC: 104 MG/DL (ref 0–149)
VLDLC SERPL CALC-MCNC: 21 MG/DL

## 2018-11-14 PROCEDURE — 6370000000 HC RX 637 (ALT 250 FOR IP): Performed by: INTERNAL MEDICINE

## 2018-11-14 PROCEDURE — 36415 COLL VENOUS BLD VENIPUNCTURE: CPT

## 2018-11-14 PROCEDURE — 6360000002 HC RX W HCPCS: Performed by: INTERNAL MEDICINE

## 2018-11-14 PROCEDURE — G0378 HOSPITAL OBSERVATION PER HR: HCPCS

## 2018-11-14 PROCEDURE — 99223 1ST HOSP IP/OBS HIGH 75: CPT | Performed by: INTERNAL MEDICINE

## 2018-11-14 PROCEDURE — 99218 PR INITIAL OBSERVATION CARE/DAY 30 MINUTES: CPT | Performed by: PSYCHIATRY & NEUROLOGY

## 2018-11-14 PROCEDURE — 80061 LIPID PANEL: CPT

## 2018-11-14 PROCEDURE — 83036 HEMOGLOBIN GLYCOSYLATED A1C: CPT

## 2018-11-14 PROCEDURE — 2580000003 HC RX 258: Performed by: INTERNAL MEDICINE

## 2018-11-14 PROCEDURE — 85651 RBC SED RATE NONAUTOMATED: CPT

## 2018-11-14 PROCEDURE — 82962 GLUCOSE BLOOD TEST: CPT

## 2018-11-14 PROCEDURE — 96372 THER/PROPH/DIAG INJ SC/IM: CPT

## 2018-11-14 RX ORDER — CLOPIDOGREL BISULFATE 75 MG/1
75 TABLET ORAL DAILY
Qty: 30 TABLET | Refills: 2 | Status: SHIPPED | OUTPATIENT
Start: 2018-11-15 | End: 2019-02-04 | Stop reason: SDUPTHER

## 2018-11-14 RX ORDER — CLOPIDOGREL BISULFATE 75 MG/1
75 TABLET ORAL DAILY
Status: DISCONTINUED | OUTPATIENT
Start: 2018-11-14 | End: 2018-11-15 | Stop reason: HOSPADM

## 2018-11-14 RX ORDER — ASPIRIN 81 MG/1
81 TABLET ORAL DAILY
Status: DISCONTINUED | OUTPATIENT
Start: 2018-11-14 | End: 2018-11-15 | Stop reason: HOSPADM

## 2018-11-14 RX ADMIN — Medication 10 ML: at 20:39

## 2018-11-14 RX ADMIN — ENOXAPARIN SODIUM 40 MG: 40 INJECTION SUBCUTANEOUS at 08:55

## 2018-11-14 RX ADMIN — OXYCODONE HYDROCHLORIDE 60 MG: 20 TABLET, FILM COATED, EXTENDED RELEASE ORAL at 00:11

## 2018-11-14 RX ADMIN — ASPIRIN 81 MG CHEWABLE TABLET 81 MG: 81 TABLET CHEWABLE at 08:55

## 2018-11-14 RX ADMIN — CLOPIDOGREL 75 MG: 75 TABLET, FILM COATED ORAL at 12:09

## 2018-11-14 RX ADMIN — OXYCODONE HYDROCHLORIDE 80 MG: 20 TABLET, FILM COATED, EXTENDED RELEASE ORAL at 08:55

## 2018-11-14 RX ADMIN — OXYCODONE HYDROCHLORIDE 60 MG: 20 TABLET, FILM COATED, EXTENDED RELEASE ORAL at 18:00

## 2018-11-14 RX ADMIN — Medication 10 ML: at 08:55

## 2018-11-14 RX ADMIN — ATORVASTATIN CALCIUM 80 MG: 40 TABLET, FILM COATED ORAL at 20:39

## 2018-11-14 ASSESSMENT — PAIN DESCRIPTION - PAIN TYPE
TYPE: CHRONIC PAIN

## 2018-11-14 ASSESSMENT — PAIN SCALES - GENERAL
PAINLEVEL_OUTOF10: 3
PAINLEVEL_OUTOF10: 7
PAINLEVEL_OUTOF10: 0
PAINLEVEL_OUTOF10: 0
PAINLEVEL_OUTOF10: 3
PAINLEVEL_OUTOF10: 1
PAINLEVEL_OUTOF10: 7
PAINLEVEL_OUTOF10: 7
PAINLEVEL_OUTOF10: 0

## 2018-11-14 ASSESSMENT — PAIN DESCRIPTION - ONSET
ONSET: GRADUAL
ONSET: GRADUAL
ONSET: ON-GOING

## 2018-11-14 ASSESSMENT — PAIN DESCRIPTION - LOCATION
LOCATION: BACK

## 2018-11-14 ASSESSMENT — PAIN DESCRIPTION - ORIENTATION
ORIENTATION: LOWER

## 2018-11-14 ASSESSMENT — VISUAL ACUITY: VA_NORMAL: 1

## 2018-11-14 ASSESSMENT — PAIN DESCRIPTION - FREQUENCY
FREQUENCY: INTERMITTENT
FREQUENCY: CONTINUOUS
FREQUENCY: INTERMITTENT

## 2018-11-14 ASSESSMENT — PAIN DESCRIPTION - PROGRESSION
CLINICAL_PROGRESSION: GRADUALLY WORSENING
CLINICAL_PROGRESSION: RAPIDLY WORSENING

## 2018-11-14 ASSESSMENT — PAIN DESCRIPTION - DESCRIPTORS
DESCRIPTORS: ACHING;DISCOMFORT;SORE;TENDER
DESCRIPTORS: ACHING;CONSTANT;DISCOMFORT

## 2018-11-14 ASSESSMENT — ENCOUNTER SYMPTOMS
SHORTNESS OF BREATH: 0
COUGH: 0

## 2018-11-14 NOTE — PROGRESS NOTES
[address]  Internal Medicine Residency Program  Progress Note - House Team 2    Patient:  Milagro Jenkins 74 y.o. male MRN: [id number]     Date of Service: 11/14/2018     CC: visual disturbance, palpitations   Overnight events: admitted from ED     Subjective     Mr. Lianet Al is seen and examined this morning, and is doing well. He is slightly agitated, which seems to be concerning him - he gets angered easily, frustrated as well. He is alert and oriented. Patient complains of no headache, palpitations, chest pain. Admits to back pain and leg pain, 7/10 intensity, which is baseline for him. Concern regarding plan of care, educated on plan. No questions at this time. Objective     Physical Exam:  · Vitals: BP (!) 143/96   Pulse 70   Temp 98.5 °F (36.9 °C) (Oral)   Resp 16   Ht 6' 2\" (1.88 m)   Wt (!) 362 lb 9.6 oz (164.5 kg)   SpO2 96%   BMI 46.56 kg/m²     · I & O - 24hr: No intake/output data recorded. · General Appearance: alert, appears stated age and cooperative  · HEENT:  Head: Normocephalic, no lesions, without obvious abnormality. · Neck: supple, symmetrical, trachea midline  · Lung: clear to auscultation bilaterally  · Heart: regular rate and rhythm, S1, S2 normal, no murmur, click, rub or gallop  · Abdomen: soft, non-tender; bowel sounds normal; no masses,  no organomegaly  · Extremities:  extremities normal, atraumatic, no cyanosis or edema  · Musculokeletal: No joint swelling, no muscle tenderness. ROM normal in all joints of extremities. · Neurologic: Mental status: Alert, oriented, thought content appropriate.  No visual disturbance currently   Subject  Pertinent Labs & Imaging Studies   lauren  CBC:   Lab Results   Component Value Date    WBC 7.4 11/13/2018    RBC 4.38 11/13/2018    HGB 12.4 11/13/2018    HCT 40.6 11/13/2018    MCV 92.7 11/13/2018    MCH 28.3 11/13/2018    MCHC 30.5 11/13/2018    RDW 12.2 11/13/2018     11/13/2018    MPV 9.0 11/13/2018     CBC with Differential:    Lab Results   Component Value Date    WBC 7.4 11/13/2018    RBC 4.38 11/13/2018    HGB 12.4 11/13/2018    HCT 40.6 11/13/2018     11/13/2018    MCV 92.7 11/13/2018    MCH 28.3 11/13/2018    MCHC 30.5 11/13/2018    RDW 12.2 11/13/2018    SEGSPCT 60 08/29/2011    LYMPHOPCT 36.2 11/13/2018    MONOPCT 8.4 11/13/2018    BASOPCT 0.5 11/13/2018    MONOSABS 0.62 11/13/2018    LYMPHSABS 2.67 11/13/2018    EOSABS 0.08 11/13/2018    BASOSABS 0.04 11/13/2018     CMP:    Lab Results   Component Value Date     11/13/2018    K 4.9 11/13/2018    K 4.0 06/25/2018     11/13/2018    CO2 28 11/13/2018    BUN 10 11/13/2018    CREATININE 1.0 11/13/2018    GFRAA >60 11/13/2018    LABGLOM >60 11/13/2018    GLUCOSE 141 11/13/2018    GLUCOSE 138 08/29/2011    PROT 8.1 11/13/2018    LABALBU 4.1 11/13/2018    LABALBU 4.3 03/04/2011    CALCIUM 9.3 11/13/2018    BILITOT 0.4 11/13/2018    ALKPHOS 86 11/13/2018    AST 13 11/13/2018    ALT 16 11/13/2018     Troponin:    Lab Results   Component Value Date    TROPONINI <0.01 11/13/2018       Student's Assessment and Plan     Denny Flaherty is a 74 y.o. male    1. TIA vs Stroke with recent cryptogenic stroke (6/2018)    - Left sided visual disturbance, blurry curtain over eyes. - On ASA and Statin, compliant    - CT today: multiple ischemic foci in posterior circulation, no intracranial hemorrhage    - MRI/MRA, likely open    - EKG NSR, patient recent follow up with cardiology, no event on recorder   - ECHO - PFO, F/U with cardiology recently for future PFO closure, right to left shunt    - Neuro consult   - Permissive HTN    - neuro checks q4H     2. DM type II    - A1c 6.9 9/10/2018    - hold home meds: metformin 500 twice daily   - monitor BG   3. HLD    - repeat lipid panels show good control   - continue statin 40 mg daily   4. LATISHA    - noncompliant due to poor sleep quality     5. Tobacco abuse    - , pt continues to smoke     6.  Chronic back and leg

## 2018-11-14 NOTE — PROGRESS NOTES
Spoke with Dr Fernando Soria re: d/c. Pt can go without heart monitor tomorrow for open MRI/MRA. [facility] notified and pt is scheduled for 0915 tomorrow. Mobile notified and stated to call at 0700 to set up transport.

## 2018-11-14 NOTE — H&P
[facility]  Internal Medicine Residency Program  History and Physical    Patient:  Merlyn Antonio 74 y.o. male MRN: [id number]     Date of Service: 11/13/2018    Hospital Day: 1      Chief complaint: palpitation and left eye visual changes  History of Present Illness   The patient is a 74 y.o. male  with PMH of DM,HLP,LATISHA( noncompliant of CPAP),tobacco abuse, recent CVA Right PCA s/p TPA (6/24/2018) currently on aspirin and statin who presented to ED with palpitation and left eye visual changes. Per patient, he started frequent episode of palpitation for last few night,lasting for few second,however denies any chest pain, sob. He also reports left sided visual changes with associate decrease vision  for same duration denies headache,dizziness ,fever, trauma, weakness and decrease sensation. ED Course: In ED, patient was evaluated NIHSS score :0, US of eye: negative  For retinal detachment, Visual acuity decrease. CT head: no acute ICH. EKG:NSR,no acute ST-T wave changes, Troponin: negative. Past Medical History:      Diagnosis Date    Back pain 2005    injured back at work. on disability, CHRONIC , HERNIATED LUMBAR, H/O EPIDURAL INJECTIONS    Cerebral artery occlusion with cerebral infarction ([address]     6-24-18 - trouble talking, stutters right hand weak     Depression     pt states he s doing well at this time.  off his meds    Diabetes mellitus ([address]     Hyperlipidemia     controlled     Sleep apnea     ordered cpap, but pt does not use it    Testicular mass     LEFT, removed surgically 2015        Past Surgical History:        Procedure Laterality Date    ACHILLES TENDON SURGERY  1975    left foot, [place], [place]    COLONOSCOPY  1990s    multiple polyps (x 3), [facility]    COLONOSCOPY  7/28/2015    incomplete colonoscopy to mid ascending colon, BE xray ordered, Dr. Skinny Jones, [address]      right eye sty removed    Ashley Rota  5/1/2002    Dr. Skinny Jones, [address] [facility]

## 2018-11-14 NOTE — CONSULTS
Manolo Lai is a 74 y.o. male       Chief Complaint   Patient presents with    Palpitations     Off and on for the past 5 to 10 days. More at night. Denies any chest pain.  Eye Problem     Blurred vision on the left eye for the past 3 days. Patient is a 74year old male admitted to the hospital for palpitations and visual changes. He recently suffered a right PCA stroke s/p TPA on 6/24/2018. He had been doing well until a few days ago when he began to experience some intermittent palpitations, stuttering of his speech which is new, and new left eye visual changes. He describes the visual changes as \"like getting tunnel vision or the lights being turned down\" in the left eye only. He denies slurring of speech, facial numbness or weakness, or weakness in his arms or legs. He has been taking his aspirin and statin as directed. He was found to have a PFO with right to left shunt on echo in June and is scheduled to have it closed in 3 weeks. Prior to Visit Medications    Medication Sig Taking? Authorizing Provider   metFORMIN (GLUCOPHAGE) 500 MG tablet Take 1 tablet by mouth 2 times daily (with meals) Yes Shukri Miranda MD   aspirin 81 MG tablet Take 1 tablet by mouth daily Last dose 4/26/15 Yes Anabell Valdes MD   meclizine (ANTIVERT) 25 MG tablet Take 1 tablet by mouth daily as needed for Dizziness Yes Anabell Valdes MD   blood glucose test strips (EXACTECH TEST) strip Use to test blood sugars twice daily, morning and evening before eating meals. Diag 250.00 provide brand covered by insurance Yes Anabell Valdes MD   atorvastatin (LIPITOR) 40 MG tablet Take 1 tablet by mouth nightly Yes Anabell Valdes MD   Lancets MISC Use to test blood sugar twice daily Yes Anabell Valdes MD   Blood Glucose Monitoring Suppl ARMANDO One glucose meter per insurance coverage, measure blood sugar twice daily morning and evening before eating meals.  Diag 250.00 Yes Talia Marin MD   oxyCODONE (ROXICODONE) 5 MG Date    TRIG 104 11/14/2018    HDL 40 11/14/2018    LDLCALC 36 11/14/2018    LABVLDL 21 11/14/2018       CT head 11/13/2018: Impression   1. No acute intracranial hemorrhage, midline shift, or mass effect. 2. Multiple foci of remote stroke notably involving the posterior   circulation     MRI brain:  Unable to obtain due to size limits    Assessment:     Patient Active Problem List   Diagnosis    Sexual dysfunction    Low back pain radiating to both legs    Voiding dysfunction    Left lumbar radiculopathy    Diabetes mellitus type 2 in obese (HCC)    Metabolic syndrome    Morbid obesity ([address]    Cyst    History of colon polyps    Protruded lumbar disc    Neural foraminal stenosis of lumbar spine    Lumbar radiculopathy    Obstructive sleep apnea    Constipation    Diverticulosis of colon    Bee sting reaction    Acute CVA (cerebrovascular accident) ([address]    PFO (patent foramen ovale)    TIA (transient ischemic attack)     Visual changes in left eye  - Resolved at this time  - Possibly represent TIA  - CT head negative  - Unable to perform MRI due to size restrictions  - Already on ASA and statin    Plan:     Continue ASA and statin  Open MRI as outpatient? Consider anticoagulation due to PFO? Casie Im  10:20 AM  11/14/2018      In June 2018 he had small lacunar, cortical stroke in high left frontal area. These time he came with 4 days of Tunnel vision and visual obscuration in the left eye and stuttering. He is supposed to get MRI in an open MRI machine tomorrow in the morning. He has been always on baby aspirin 81 mg from years ago. He doesn't seem that after he is a stroke in June 2018 a change in terms of antiplatelets or anticoagulation was made. If tomorrow MRI shows a new stroke we can add more antiplatelets (here he was started on Plavix 75 mg daily by the primary physician, we really decide about the necessity of that after seeing the MRI). His LDL is 36.  At home he is on

## 2018-11-15 ENCOUNTER — APPOINTMENT (OUTPATIENT)
Dept: MRI IMAGING | Age: 68
End: 2018-11-15
Payer: MEDICARE

## 2018-11-15 VITALS
OXYGEN SATURATION: 96 % | HEART RATE: 64 BPM | WEIGHT: 315 LBS | TEMPERATURE: 98.3 F | HEIGHT: 74 IN | RESPIRATION RATE: 16 BRPM | DIASTOLIC BLOOD PRESSURE: 65 MMHG | SYSTOLIC BLOOD PRESSURE: 117 MMHG | BODY MASS INDEX: 40.43 KG/M2

## 2018-11-15 LAB — METER GLUCOSE: 166 MG/DL (ref 74–99)

## 2018-11-15 PROCEDURE — 99225 PR SBSQ OBSERVATION CARE/DAY 25 MINUTES: CPT | Performed by: NURSE PRACTITIONER

## 2018-11-15 PROCEDURE — 6370000000 HC RX 637 (ALT 250 FOR IP): Performed by: INTERNAL MEDICINE

## 2018-11-15 PROCEDURE — 99232 SBSQ HOSP IP/OBS MODERATE 35: CPT | Performed by: INTERNAL MEDICINE

## 2018-11-15 PROCEDURE — 70544 MR ANGIOGRAPHY HEAD W/O DYE: CPT

## 2018-11-15 PROCEDURE — 70551 MRI BRAIN STEM W/O DYE: CPT

## 2018-11-15 PROCEDURE — 6360000002 HC RX W HCPCS: Performed by: INTERNAL MEDICINE

## 2018-11-15 PROCEDURE — G0378 HOSPITAL OBSERVATION PER HR: HCPCS

## 2018-11-15 PROCEDURE — 70547 MR ANGIOGRAPHY NECK W/O DYE: CPT

## 2018-11-15 PROCEDURE — 2580000003 HC RX 258: Performed by: INTERNAL MEDICINE

## 2018-11-15 PROCEDURE — 96372 THER/PROPH/DIAG INJ SC/IM: CPT

## 2018-11-15 PROCEDURE — 82962 GLUCOSE BLOOD TEST: CPT

## 2018-11-15 RX ADMIN — ASPIRIN 81 MG: 81 TABLET ORAL at 08:49

## 2018-11-15 RX ADMIN — OXYCODONE HYDROCHLORIDE 80 MG: 20 TABLET, FILM COATED, EXTENDED RELEASE ORAL at 08:49

## 2018-11-15 RX ADMIN — CLOPIDOGREL 75 MG: 75 TABLET, FILM COATED ORAL at 08:49

## 2018-11-15 RX ADMIN — Medication 10 ML: at 08:49

## 2018-11-15 RX ADMIN — METFORMIN HYDROCHLORIDE 1000 MG: 1000 TABLET ORAL at 16:16

## 2018-11-15 RX ADMIN — ENOXAPARIN SODIUM 40 MG: 40 INJECTION SUBCUTANEOUS at 08:49

## 2018-11-15 ASSESSMENT — PAIN DESCRIPTION - ORIENTATION: ORIENTATION: LEFT

## 2018-11-15 ASSESSMENT — PAIN SCALES - GENERAL
PAINLEVEL_OUTOF10: 0
PAINLEVEL_OUTOF10: 0
PAINLEVEL_OUTOF10: 8
PAINLEVEL_OUTOF10: 0
PAINLEVEL_OUTOF10: 0

## 2018-11-15 ASSESSMENT — PAIN DESCRIPTION - PAIN TYPE: TYPE: CHRONIC PAIN

## 2018-11-15 ASSESSMENT — PAIN DESCRIPTION - LOCATION: LOCATION: LEG

## 2018-11-15 ASSESSMENT — PAIN DESCRIPTION - DESCRIPTORS: DESCRIPTORS: ACHING;BURNING;DISCOMFORT

## 2018-11-15 NOTE — PROGRESS NOTES
cessation       PT/OT evaluation:  DVT prophylaxis/ GI prophylaxis: lovenox  Disposition: discharge to home    Guera Chua MD, PGY-1  Internal medicine resident    Attending physician Dr. Ally Shields

## 2018-11-15 NOTE — PROGRESS NOTES
[facility]  Internal Medicine Residency    Internal Medicine     Attending Physician Statement  I have discussed the case, including pertinent history and exam findings with the resident and the team.  I have seen and examined the patient and the key elements of the encounter have been performed by me. I agree with the assessment, plan and orders as documented by the resident. AP:  1. TIA:  - Neuro on board  - Risk factor modification  - Plavix and lipitor    2. Hx of stroke    3.  DM2- A1c 7.0: start metformin as o/p    Likely discharge today    Jessica Rhodes MD

## 2018-11-15 NOTE — PROGRESS NOTES
arteries. MRA Neck WO Contrast   Final Result   1. No hemodynamically significant stenosis by NASCET criteria   involving the bilateral internal carotid and vertebral arteries. XR CHEST PORTABLE   Final Result   No acute cardiopulmonary pathology evident. CT Head WO Contrast   Final Result   1. No acute intracranial hemorrhage, midline shift, or mass effect. 2. Multiple foci of remote stroke notably involving the posterior   circulation        All labs and imaging reviewed independently     Assessment:     Pt presented to ED with c/o tunnel vision and visual obscuration x4 days along with palpitations. At present pt back to baseline. Pt has nonfocal neuro exam with negative MRI Brain for acute stroke, chronic infarcts were noted to bilateral cerebellum, R caudate nucleus and R thalamus. He recently suffered a right PCA stroke s/p TPA on 6/24/2018 and now states he has speech impediment since that stroke. Pt has been on aspirin and statin as directed. Pt also has PFO and is due to have it closed in December. Another important issue is that his ROPE score is 3 which means it is unlikely that his strokes are due to PFO. Therefore from neurology standpoint, the closure of PFO will not probably decrease the future strokes in him significantly unless this closure is indicated for other reasons. TIA with complete resolution of symptoms and nonfocal neuro exam at present time.       Patient Active Problem List   Diagnosis    Sexual dysfunction    Low back pain radiating to both legs    Voiding dysfunction    Left lumbar radiculopathy    Diabetes mellitus type 2 in obese (HCC)    Metabolic syndrome    Morbid obesity ([address]    Cyst    History of colon polyps    Protruded lumbar disc    Neural foraminal stenosis of lumbar spine    Lumbar radiculopathy    Obstructive sleep apnea    Constipation    Diverticulosis of colon    Bee sting reaction    Acute CVA (cerebrovascular

## 2018-11-15 NOTE — PLAN OF CARE
Problem: Falls - Risk of:  Goal: Will remain free from falls  Will remain free from falls   Outcome: Met This Shift  Pt had no falls this shift on PCCU

## 2018-11-15 NOTE — PROGRESS NOTES
Occupational Therapy  OCCUPATIONAL THERAPY INITIAL EVALUATION      Date:11/15/2018  Patient Name: Luz Florez  MRN: [id number]  : 1950  Room: [facility]     Evaluating OT: Js Cedeno OTR/L       Recommended Adaptive Equipment: TBA     AM-PAC Daily Activity Raw Score: 22/24  G Code:  CJ    Diagnosis: TIA   Past Medical History: CVA 2018, back pain,   Precautions: Low fall risk    ** patient feels he would benefit from outpatient Speech therapy - (since his stroke - he notices he stutters, and has some cognitive concerns)      Home Living/PLOF:   Patient lives with wife, 2 story (bed/bath on 2nd),  3 steps/rail to enter. PTA: Independent (per pt:   wife does assist with lower body ADLs - d/t back issues) , uses cane (mostly for long distance)     Pain Level: pt c/o no pain  this session     Hearing: WFLS  Vision: WFLS      Cognition: oriented x 3    UE Assessment:  Hand Dominance: Right [x]  Left []    UE AROM and strength WFLs       Functional Assessment:   Initial Eval Status 11/15/18  Comments   Feeding  Independent  Cutting food and opening containers    Grooming  Independent   Standing at sink brushing teeth     Upper Body Dressing Mod I      Lower Body Dressing Min A  Wife assisitng PTA    Bathing     Toileting      Bed Mobility  Upon entry - up walking around the room     Functional Transfers Independent   Sit-stand from chair      Functional Mobility Mod I   Ambulating in room     No LOB or SOB noted      Sit balance: Independent   Stand balance: Independent   Endurance/Activity tolerance: Good   Sensation:                                 Comments: Upon arrival pt walking in room . At end of session pt sitting in chair - eating lunch  with all devices within reach, all lines and tubes intact. Call light within reach.          Assessment of current deficits    Functional mobility []  ROM [] Strength []  Cognition []  ADLs []   IADLs [] Safety Awareness [] Endurance []  Fine Motor Coordination

## 2018-11-16 ENCOUNTER — TELEPHONE (OUTPATIENT)
Dept: INTERNAL MEDICINE | Age: 68
End: 2018-11-16

## 2018-11-16 NOTE — DISCHARGE SUMMARY
Internal Medicine Department   Discharge summary    Patient ID:  Varinder Paredes  74 y.o.  1950    Admission Date: 11/13/2018     Discharge Date: 11/15/2018         House team:  2    Admission Dx:  TIA   Recent hx of Cryptogenic Stroke  DM type II  HLD  LATISHA  Tobacco abuse    Discharge Dx:  TIA   Recent hx of Cryptogenic Stroke  DM type II  HLD  LATISHA  Tobacco abuse      Consults:  Neurology    Procedures:  No discharge procedures on file. Brief summary of the patient course: The patient is a 74 y.o. male  with PMH of DM,HLP,LATISHA( noncompliant of CPAP),tobacco abuse, recent CVA Right PCA s/p TPA (6/24/2018) currently on aspirin and statin who presented to ED with palpitation and left eye visual changes for 4 days. Per patient, he started frequent episode of palpitation for last few night,lasting for few second,however denies any chest pain, sob. He also reports left sided visual changes with associate  vision changes(light been turn down)  for same duration. He denies headache,dizziness ,fever, trauma, focal weakness and decrease sensation. In ED, patient was evaluated NIHSS score :0, US of eye: negative  for retinal detachment, Visual acuity decrease. CT head: no acute ICH. EKG:NSR,no acute ST-T wave changes, Troponin: negative. During hospital course, patient left eye visual change improved,not new weakness or focal deficit noted. Patient continued on aspirin and statin. Off note PFO note in ECHO on last admission(june) which was plan for repair on December by Dr. Yusuf Diehl. Neurology was consulted, MRI /MRA head ordered which didn't show acute intracranial pathology. Patient started on plavix 75 mg daily,continue aspirin and statin,counseled on tobacco cessation and subsequently discharge to home and plan for follow in IM [facility].       Discharge medication:   Sasha Del Angel   Home Medication Instructions XLU:[id number]    Printed on:11/15/18 8536   Medication Information                      aspirin 81 MG tablet  Take 1 Dizziness     metFORMIN 500 MG tablet  Commonly known as:  GLUCOPHAGE  Take 1 tablet by mouth 2 times daily (with meals)     * oxyCODONE 40 MG CR tablet  Commonly known as:  OXYCONTIN     * oxyCODONE 5 MG immediate release tablet  Commonly known as:  ROXICODONE        * This list has 2 medication(s) that are the same as other medications prescribed for you. Read the directions carefully, and ask your doctor or other care provider to review them with you. STOP taking these medications    sildenafil 20 MG tablet  Commonly known as:  REVATIO     terazosin 1 MG capsule  Commonly known as:  HYTRIN           Where to Get Your Medications      These medications were sent to [address] [phone]  [address] [place] [address]    Phone:  [phone]   · clopidogrel 75 MG tablet         Discharge Instructions:   Diet: diabetic  Activity: as tolerated  Be compliant with medication      Disposition:HOME    Follow up: 1. Internal medicine Ambulatory Clinic, on 21st Nov 2018 at 9:30 Am   2. Follow up with Dr. Cristela Horn as schedule.       Attending Physician: Fredrick Stahl MD PGY1,   11/15/2018 7:16 PM

## 2018-11-18 LAB
EKG ATRIAL RATE: 67 BPM
EKG P AXIS: 35 DEGREES
EKG P-R INTERVAL: 146 MS
EKG Q-T INTERVAL: 416 MS
EKG QRS DURATION: 94 MS
EKG QTC CALCULATION (BAZETT): 439 MS
EKG R AXIS: -36 DEGREES
EKG T AXIS: 10 DEGREES
EKG VENTRICULAR RATE: 67 BPM

## 2018-11-21 ENCOUNTER — CARE COORDINATION (OUTPATIENT)
Dept: CARE COORDINATION | Age: 68
End: 2018-11-21

## 2018-11-21 ENCOUNTER — OFFICE VISIT (OUTPATIENT)
Dept: INTERNAL MEDICINE | Age: 68
End: 2018-11-21
Payer: MEDICARE

## 2018-11-21 VITALS
TEMPERATURE: 98 F | SYSTOLIC BLOOD PRESSURE: 113 MMHG | HEART RATE: 81 BPM | HEIGHT: 74 IN | BODY MASS INDEX: 40.43 KG/M2 | DIASTOLIC BLOOD PRESSURE: 72 MMHG | WEIGHT: 315 LBS | RESPIRATION RATE: 18 BRPM

## 2018-11-21 DIAGNOSIS — I63.9 CEREBROVASCULAR ACCIDENT (CVA), UNSPECIFIED MECHANISM (HCC): Primary | ICD-10-CM

## 2018-11-21 DIAGNOSIS — E66.01 MORBID OBESITY (HCC): ICD-10-CM

## 2018-11-21 DIAGNOSIS — E66.9 DIABETES MELLITUS TYPE 2 IN OBESE (HCC): Chronic | ICD-10-CM

## 2018-11-21 DIAGNOSIS — E11.69 DIABETES MELLITUS TYPE 2 IN OBESE (HCC): Chronic | ICD-10-CM

## 2018-11-21 DIAGNOSIS — E78.5 HYPERLIPIDEMIA, UNSPECIFIED HYPERLIPIDEMIA TYPE: ICD-10-CM

## 2018-11-21 DIAGNOSIS — G45.9 TIA (TRANSIENT ISCHEMIC ATTACK): ICD-10-CM

## 2018-11-21 PROCEDURE — 3045F PR MOST RECENT HEMOGLOBIN A1C LEVEL 7.0-9.0%: CPT | Performed by: INTERNAL MEDICINE

## 2018-11-21 PROCEDURE — 4040F PNEUMOC VAC/ADMIN/RCVD: CPT | Performed by: INTERNAL MEDICINE

## 2018-11-21 PROCEDURE — 2022F DILAT RTA XM EVC RTNOPTHY: CPT | Performed by: INTERNAL MEDICINE

## 2018-11-21 PROCEDURE — 1123F ACP DISCUSS/DSCN MKR DOCD: CPT | Performed by: INTERNAL MEDICINE

## 2018-11-21 PROCEDURE — G8417 CALC BMI ABV UP PARAM F/U: HCPCS | Performed by: INTERNAL MEDICINE

## 2018-11-21 PROCEDURE — G8427 DOCREV CUR MEDS BY ELIG CLIN: HCPCS | Performed by: INTERNAL MEDICINE

## 2018-11-21 PROCEDURE — G8484 FLU IMMUNIZE NO ADMIN: HCPCS | Performed by: INTERNAL MEDICINE

## 2018-11-21 PROCEDURE — 99213 OFFICE O/P EST LOW 20 MIN: CPT | Performed by: INTERNAL MEDICINE

## 2018-11-21 PROCEDURE — G8598 ASA/ANTIPLAT THER USED: HCPCS | Performed by: INTERNAL MEDICINE

## 2018-11-21 PROCEDURE — 3017F COLORECTAL CA SCREEN DOC REV: CPT | Performed by: INTERNAL MEDICINE

## 2018-11-21 PROCEDURE — 1101F PT FALLS ASSESS-DOCD LE1/YR: CPT | Performed by: INTERNAL MEDICINE

## 2018-11-21 PROCEDURE — 1036F TOBACCO NON-USER: CPT | Performed by: INTERNAL MEDICINE

## 2018-11-21 RX ORDER — MECLIZINE HYDROCHLORIDE 25 MG/1
25 TABLET ORAL DAILY PRN
Qty: 30 TABLET | Refills: 3 | Status: CANCELLED | OUTPATIENT
Start: 2018-11-21

## 2018-11-21 RX ORDER — GLUCOSAMINE HCL/CHONDROITIN SU 500-400 MG
CAPSULE ORAL
Qty: 100 STRIP | Refills: 3 | Status: SHIPPED | OUTPATIENT
Start: 2018-11-21 | End: 2019-02-04 | Stop reason: SDUPTHER

## 2018-11-21 RX ORDER — CLOPIDOGREL BISULFATE 75 MG/1
75 TABLET ORAL DAILY
Qty: 30 TABLET | Status: CANCELLED | OUTPATIENT
Start: 2018-11-21

## 2018-11-21 RX ORDER — ATORVASTATIN CALCIUM 40 MG/1
40 TABLET, FILM COATED ORAL NIGHTLY
Qty: 30 TABLET | Refills: 3 | Status: SHIPPED | OUTPATIENT
Start: 2018-11-21 | End: 2019-02-04 | Stop reason: SDUPTHER

## 2018-11-21 RX ORDER — LANCETS 30 GAUGE
EACH MISCELLANEOUS
Qty: 100 EACH | Refills: 3 | Status: SHIPPED | OUTPATIENT
Start: 2018-11-21 | End: 2019-02-04 | Stop reason: SDUPTHER

## 2018-11-21 ASSESSMENT — PATIENT HEALTH QUESTIONNAIRE - PHQ9
2. FEELING DOWN, DEPRESSED OR HOPELESS: 1
SUM OF ALL RESPONSES TO PHQ QUESTIONS 1-9: 2
SUM OF ALL RESPONSES TO PHQ QUESTIONS 1-9: 2
SUM OF ALL RESPONSES TO PHQ9 QUESTIONS 1 & 2: 2
1. LITTLE INTEREST OR PLEASURE IN DOING THINGS: 1

## 2018-11-21 NOTE — PROGRESS NOTES
[address]  Internal Medicine Clinic    Attending Physician Statement:    I have discussed the case, including pertinent history and exam findings with the resident. I have reviewed their recent hospital stay including the discharge summary and pertinent data. I agree with the assessment, plan and orders as documented by the resident. Patient is here for a Hospital Follow Up Appointment. DM - stable on metformin - continue same    Recently admitted with TIA and failed ASA - in the past and tolerating plavix - planning for closure for PFO with cardiology     Remainder of medical problems as per resident note.     Vanesa Winston D.O.  11/21/2018 10:50 AM

## 2018-11-21 NOTE — CARE COORDINATION
barriers: control calorie intake   Confidence: 6/10  Anticipated Goal Completion Date:          Other     Care Coordination Self Management   No change (11/21/2018)             CC Self Management Goal  Patient Goal (What steps will patient take to achieve goal?):   DIABETES: blood sugar under 150  - to monitor fasting bloods sugars daily, document and bring to next office visit  - to be mindful of what he eats and to attend [place] dietitian class for personalized carb counting diet. - to take medication as directed  Patient is able to discuss self-management of condition(s):  Pt demonstrates adherence to medications  Pt demonstrates understanding of self-monitoring  Patient is able to identify Red Flags:  Alert to potential adverse drug reactions(s) or side effects and actions to take should they arise  Discuss target symptoms and actions to take should they arise  Identify problems that require immediate PCP or specialist visit  Patient demonstrates understanding of access to PCP/Specialist:  Understands about scheduling routine Follow Up appointments   Understands about sick day appointment options for worsening of symptoms/progression (Same Day, E Visits)       Conditions and Symptoms   Improving (11/21/2018)             I will follow my Zone Management tool to seek urgent or emergent care. I will notify my provider of any symptoms that indicate a worsening of my condition. Barriers: none  Plan for overcoming my barriers: read information mailed to patient  Confidence: /10  Anticipated Goal Completion Date:               Prior to Admission medications    Medication Sig Start Date End Date Taking? Authorizing Provider   atorvastatin (LIPITOR) 40 MG tablet Take 1 tablet by mouth nightly 11/21/18   Zachariah Cisneros MD   blood glucose monitor strips Test 1 time  2-3 times per week & as needed for symptoms of irregular blood glucose.  11/21/18   Zachariah Cisneros MD   Lancets MISC Test as needed 11/21/18 Marcial Barahona MD   clopidogrel (PLAVIX) 75 MG tablet Take 1 tablet by mouth daily 11/15/18   Jennifer Villa MD   metFORMIN (GLUCOPHAGE) 500 MG tablet Take 1 tablet by mouth 2 times daily (with meals) 10/23/18   Julianna Knott MD   aspirin 81 MG tablet Take 1 tablet by mouth daily Last dose 4/26/15 9/10/18   Yao Cotter MD   meclizine (ANTIVERT) 25 MG tablet Take 1 tablet by mouth daily as needed for Dizziness 9/10/18   Yao Cotter MD   blood glucose test strips [facility] [place] TEST) strip Use to test blood sugars twice daily, morning and evening before eating meals. Diag 250.00 provide brand covered by insurance 9/10/18   Yao Cotter MD   Lancets MISC Use to test blood sugar twice daily 6/1/18   Yao Cotter MD   Blood Glucose Monitoring Suppl ARMANDO One glucose meter per insurance coverage, measure blood sugar twice daily morning and evening before eating meals. Diag 250.00 2/20/15   Chely Early MD   oxyCODONE (ROXICODONE) 5 MG immediate release tablet Take 5 mg by mouth every evening. [facility] through [facility]. Historical Provider, MD   oxycodone (OXYCONTIN) 40 MG CR tablet 80 mg in am; 60 mg in pm-ordered through [facility]. Historical Provider, MD       Future Appointments  Date Time Provider Daniela Banerjee   11/30/2018 9:40 AM KVNG Caba - CNP [facility] NEURO [facility]   12/6/2018 9:30 AM [facility] CVL PERIPHERAL LAB SE[facility]Z CATH None   1/10/2019 2:00 PM [facility] ZANE ECHO RM 1 SEYZ CARDIO None   1/10/2019 3:00 PM Eric Crawford MD [facility] CARDIO [facility]   2/4/2019 10:00 AM Yao Cotter MD ACC [facility]      and   Diabetes Assessment    Medic Alert ID:  No  Meal Planning:  Avoidance of concentrated sweets   How often do you test your blood sugar?:  No Testing (Comment: no meter)   Do you have barriers with adherence to non-pharmacologic self-management interventions?  (Nutrition/Exercise/Self-Monitoring):  Yes   Have you ever had to go to the ED for symptoms of low blood sugar?:  No       No patient-reported symptoms

## 2018-11-21 NOTE — PROGRESS NOTES
tablet 2    metFORMIN (GLUCOPHAGE) 500 MG tablet Take 1 tablet by mouth 2 times daily (with meals) 60 tablet 3    aspirin 81 MG tablet Take 1 tablet by mouth daily Last dose 4/26/15 30 tablet 3    meclizine (ANTIVERT) 25 MG tablet Take 1 tablet by mouth daily as needed for Dizziness 30 tablet 3    oxyCODONE (ROXICODONE) 5 MG immediate release tablet Take 5 mg by mouth every evening. [facility] through [facility].  oxycodone (OXYCONTIN) 40 MG CR tablet 80 mg in am; 60 mg in pm-ordered through [facility].  blood glucose test strips (EXACTECH TEST) strip Use to test blood sugars twice daily, morning and evening before eating meals. Diag 250.00 provide brand covered by insurance 100 each 3    Lancets MISC Use to test blood sugar twice daily 100 each 3    Blood Glucose Monitoring Suppl ARMANDO One glucose meter per insurance coverage, measure blood sugar twice daily morning and evening before eating meals. Diag 250.00 1 Device 0     No current facility-administered medications on file prior to visit. OBJECTIVE:    VS: /72 (Site: Left Upper Arm, Position: Sitting, Cuff Size: Large Adult)   Pulse 81   Temp 98 °F (36.7 °C)   Resp 18   Ht 6' 1.75\" (1.873 m)   Wt (!) 362 lb (164.2 kg)   BMI 46.79 kg/m²   General appearance: Alert, Awake, Oriented times 3, no distress  Lungs: Lungs clear to auscultation bilaterally. No rhonchi, crackles or wheezes  Heart: S1 S2  Regular rate and rhythm. No rub, murmur or gallop  Abdomen: Abdomen soft , non-tender. non-distended BS normal. No masses, organomegaly, no guarding rebound or rigidity.   Extremities: No edema    ASSESSMENT/PLAN:  Herve Holbrook was seen today for follow-up from hospital.    Diagnoses and all orders for this visit:    Cerebrovascular accident (CVA), unspecified mechanism ([facility]  -     [facility], [address], [place]/Wellness    Diabetes mellitus type 2 in obese ([facility]  - blood glucose monitor strips; Test 1 time  2-3 times per week & as needed for symptoms of irregular blood glucose. -     Lancets MISC; Test as needed    TIA (transient ischemic attack)  -     [facility], [address] [place] [address] [address], [place]ness    Morbid obesity ([facility]    Hyperlipidemia, unspecified hyperlipidemia type  -     atorvastatin (LIPITOR) 40 MG tablet; Take 1 tablet by mouth nightly    Other orders  -     Cancel: clopidogrel (PLAVIX) 75 MG tablet; Take 1 tablet by mouth daily  -     Cancel: aspirin 81 MG tablet; Take 1 tablet by mouth daily Last dose 4/26/15  -     Cancel: meclizine (ANTIVERT) 25 MG tablet;  Take 1 tablet by mouth daily as needed for Dizziness        RTC:  Continue aspirin Plavix and Lipitor  Continue metformin  Referral to PT and speech (pt request)     I have reviewed my findings and recommendations with Neha Pinto and Dr Angelique Castaneda MD PGY-3  11/21/2018 2:34 PM

## 2018-11-27 ENCOUNTER — TELEPHONE (OUTPATIENT)
Dept: INTERNAL MEDICINE | Age: 68
End: 2018-11-27

## 2018-11-29 ENCOUNTER — TELEPHONE (OUTPATIENT)
Dept: INTERNAL MEDICINE | Age: 68
End: 2018-11-29

## 2018-11-29 NOTE — TELEPHONE ENCOUNTER
Spoke with pt and reviewed Medicare/Medicaid; also called [facility] who confirms application for QMB was applied for thru rep and [facility] is authorized rep. Pt has gathered all information required and will bring to  Khadar Nicolas at [facility] on 11-30-18; Pt's qualification for QMB will open other possibilities which potentially be completed after the open enrollment time.   Advised pt I will check with jfs on Wednesday and advise

## 2018-11-30 ENCOUNTER — HOSPITAL ENCOUNTER (OUTPATIENT)
Dept: PHYSICAL THERAPY | Age: 68
Setting detail: THERAPIES SERIES
Discharge: HOME | End: 2018-11-30
Payer: MEDICARE

## 2018-11-30 PROCEDURE — 97161 PT EVAL LOW COMPLEX 20 MIN: CPT

## 2018-11-30 PROCEDURE — G8979 MOBILITY GOAL STATUS: HCPCS

## 2018-11-30 PROCEDURE — G8978 MOBILITY CURRENT STATUS: HCPCS

## 2018-12-03 ENCOUNTER — TELEPHONE (OUTPATIENT)
Dept: CARDIOLOGY CLINIC | Age: 68
End: 2018-12-03

## 2018-12-04 ENCOUNTER — HOSPITAL ENCOUNTER (OUTPATIENT)
Dept: PHYSICAL THERAPY | Age: 68
Setting detail: THERAPIES SERIES
Discharge: HOME | End: 2018-12-04
Payer: MEDICARE

## 2018-12-04 ENCOUNTER — HOSPITAL ENCOUNTER (OUTPATIENT)
Dept: SPEECH THERAPY | Age: 68
Setting detail: THERAPIES SERIES
Discharge: HOME | End: 2018-12-04
Payer: MEDICARE

## 2018-12-04 PROCEDURE — G9163 LANG EXPRESS GOAL STATUS: HCPCS

## 2018-12-04 PROCEDURE — G9162 LANG EXPRESS CURRENT STATUS: HCPCS

## 2018-12-04 PROCEDURE — 92523 SPEECH SOUND LANG COMPREHEN: CPT

## 2018-12-04 PROCEDURE — G9164 LANG EXPRESS D/C STATUS: HCPCS

## 2018-12-04 NOTE — PROGRESS NOTES
Speech Language Pathology   ASSESSMENT OF SPEECH, LANGUAGE, & COGNITION        SPEECH PATHOLOGY DIAGNOSIS:  Normal speech and language skills      THERAPY RECOMMENDATIONS:      [x] Speech Pathology intervention is not warranted at this time. [] Malnutrition indicators have been identified and nursing has been notified to ensure a dietitian is consulted. OBJECTIVE ASSESSMENT:    Oral mechanism examination-      [x] Adequate lingual/labial strength    [] Generalized oral weakness       [] Left labiobuccal weakness    [] Left lingual deviation      [] Right labiobuccal weakness    [] Right lingual deviation      [] Oral apraxia       [] CNT      Parameters of speech production-    Respiration: [x] WFL         [] SOB           [] Inadequate for speech production     Articulation: [x] WFL          [] Distortions    Quality: [x] WFL    [] Dysphonia    [] Aphonia     Resonance:  [x] WFL        [] Hypernasal          [] Hyponasal                  Pitch:    [x] WFL       [] High           [] Low                  Intensity: [x] WFL    [] Loud    [] Quiet     Fluency:  [x] Intact        [] Anticipatory struggle             Prosody: [x] Intact    [] Monotonous    [] Irregular fluctuation             Receptive Language-    Comprehension    WNL Latent Inconsist. Perseverat. Cued N/A    Yes/no   questions x         Wh-   questions x         Conversational  speech x        Identification    WNL Latent Inconsist. Perseverat. Cued N/A    Objects   x         Pictures   x        Verbal  directives  WNL Latent Inconsist. Perseverat. Cued N/A    Simple  commands x         Intermediate level   Commands x         Complex  commands   x      Written  directives  WNL Latent Inconsist. Perseverat.  Cued N/A    Simple   Commands x         Intermediate level  Commands x         Complex   commands   x          Expressive Language-    Expression          Intact     Emerging             Impaired    Spontaneous   speech x Serial   speech x            Imitation tasks   Word level x         Sentence level x            Naming tasks   Confrontation naming x         Functional description x                 Response naming x            Conversational   Speech tasks   Structured x        Unstructured x            Written   Expression    Structured x        Unstructured          Comments:          Grammatical form:             [x] Intact     [] Impaired      [] Anomia present           [] Circumlocution present       [] Paraphasic errors present        Cognition-       Attention/orientation    Attention:    [x] Sustained attention         [] Easily distracted     Neglect:      [x] Absent         [] Left    [] Right    Orientation:    [x] Person    [x] Place    [x] Date    x[x] Reason for hospitalization       Memory:     Intact       Mild      deficit   Moderate     deficit     Marked       deficit     Immediate  STM recall x               Recent   STM recall  x              Remote  LTM recall x         Comments:        Organization,  Problem solving/  Reasoning      Functional        Impaired   Sequencing           Verbal task x       Motor task x    Problem Solving           Verbal task x       Motor task x    Mathematics         Simple  arithmetic x     Functionally  based/IADL task x        Comments:       Judgment/insight/safety:      [x] Good    [] Fair    [] Poor    [] Emerging       Salient clinical observations     [] Latent processing   [] Latent responses    [] Inconsistent responses    [] Perseveration    [] Cueing necessary for accurate completion of task      [] Neglect present     [] Left     [] Right      [] Hemianopsia present     [] Left     [] Right                  EDUCATION/GOAL PLANNING    Education completed with:      [x] patient      [] family    Speech Pathologist (SLP) completed education with the patient and/or family regarding identified cognitive linguistic status post CVA and TIA.  Patient reported that he

## 2018-12-04 NOTE — TELEPHONE ENCOUNTER
Left message with patient that PFO closure will be scheduled for January 17, 2018. Will sent paper work to patient with new dates and time.     ALEXANDER

## 2018-12-06 ENCOUNTER — HOSPITAL ENCOUNTER (OUTPATIENT)
Dept: PHYSICAL THERAPY | Age: 68
Setting detail: THERAPIES SERIES
Discharge: HOME | End: 2018-12-06
Payer: MEDICARE

## 2018-12-12 ENCOUNTER — CARE COORDINATION (OUTPATIENT)
Dept: CARE COORDINATION | Age: 68
End: 2018-12-12

## 2018-12-13 ENCOUNTER — HOSPITAL ENCOUNTER (OUTPATIENT)
Dept: PHYSICAL THERAPY | Age: 68
Setting detail: THERAPIES SERIES
Discharge: HOME | End: 2018-12-13
Payer: MEDICARE

## 2018-12-13 PROCEDURE — 97110 THERAPEUTIC EXERCISES: CPT

## 2018-12-13 NOTE — CARE COORDINATION
Ambulatory Care Coordination Note  12/13/2018  CM Risk Score: 2  Anthony Mortality Risk Score: 4.93    ACC: [place] RN    Summary Note:   ACC spoke with Doris Longoria for diabetes follow up. He reports he has a new glucometer and has been checking his blood sugars daily and they have been in the 140's. He states he is in the process of completing his health insurance and has submitted all necessary documentation. He is currently going for water therapy at [facility] and would like to continue. ACC will consult PCP for order. He reports he is progressing with therapy and is sometimes able to ambulate without his cane. ACC reviewed upcoming PFO surgery on 1/17/19. He states he has not received his pre-admission paperwork. PLAN  ACC will review blood sugars and zones with next interaction, and review pre-admission paperwork. Care Coordination Interventions    Program Enrollment:  Rising Risk  Referral from Primary Care Provider:  Yes  Suggested Interventions and [address]:  Completed (Comment: sees [address]  [phone])  Diabetes Education:  Completed  Medication Assistance Program:  In Process (Comment: Not sure if get assistance with Extra Help but will call [employer] for clarification. states that  has not part D- mailed to patient information about Extra Help and part D  assistance contact information for computor/phone access)  Smoking Cessation: In Process (Comment: patient to consider Smoking Cessation program)  Zone Management Tools:  Completed (Comment: mailed to patient DM zone information)         Goals Addressed             Most Recent       Patient Stated     COMPLETED: Patient Stated (pt-stated)   No change (11/21/2018)             Review information mailed to him regarding Zone management of DM, Fall prevention handout and Medicare. gov for part D sign up and Extra Help    Barriers: none  Plan for overcoming my barriers: by mouth nightly 11/21/18   Park Alcala MD   blood glucose monitor strips Test 1 time  2-3 times per week & as needed for symptoms of irregular blood glucose. 11/21/18   Shama Everett MD   Lancets MISC Test as needed 11/21/18   Park Alcala MD   clopidogrel (PLAVIX) 75 MG tablet Take 1 tablet by mouth daily 11/15/18   Willem Birch MD   metFORMIN (GLUCOPHAGE) 500 MG tablet Take 1 tablet by mouth 2 times daily (with meals) 10/23/18   Jessica Rhodes MD   aspirin 81 MG tablet Take 1 tablet by mouth daily Last dose 4/26/15 9/10/18   Dustin Luna MD   meclizine (ANTIVERT) 25 MG tablet Take 1 tablet by mouth daily as needed for Dizziness 9/10/18   Dustin Luna MD   blood glucose test strips [facility] TEST) strip Use to test blood sugars twice daily, morning and evening before eating meals. Diag 250.00 provide brand covered by insurance 9/10/18   Dustin Luna MD   Lancets MISC Use to test blood sugar twice daily 6/1/18   Dustin Luna MD   Blood Glucose Monitoring Suppl ARMANDO One glucose meter per insurance coverage, measure blood sugar twice daily morning and evening before eating meals. Diag 250.00 2/20/15   Tiffanie Huizar MD   oxyCODONE (ROXICODONE) 5 MG immediate release tablet Take 5 mg by mouth every evening. [facility]. Historical Provider, MD   oxycodone (OXYCONTIN) 40 MG CR tablet 80 mg in am; 60 mg in pm-ordered through [facility].     Historical Provider, MD       Future Appointments  Date Time Provider Daniela Banerjee   12/13/2018 10:15 AM Slade Nagel, PT [facility] PT None   1/17/2019 9:30 AM [facility]L PERIPHERAL LAB SE[facility] CATH None   2/4/2019 10:00 AM Dustin Luna MD ACC [facility]   2/18/2019 2:00 PM [facility] ECHO RM 1 SEY[facility] CARDIO None   2/21/2019 11:00 AM [facility]   3/19/2019 1:40 PM Rosalia Natarajan, APRN - CNP [facility]     ,   Diabetes Assessment    Medic Alert ID:

## 2018-12-17 ENCOUNTER — HOSPITAL ENCOUNTER (OUTPATIENT)
Dept: PHYSICAL THERAPY | Age: 68
Setting detail: THERAPIES SERIES
Discharge: HOME | End: 2018-12-17
Payer: MEDICARE

## 2018-12-17 PROCEDURE — G8980 MOBILITY D/C STATUS: HCPCS

## 2018-12-17 PROCEDURE — G8979 MOBILITY GOAL STATUS: HCPCS

## 2018-12-17 NOTE — PROGRESS NOTES
[address] [facility]                Phone: [phone]   Fax: [phone]      Physical Therapy  Non compliance/Attendance Issue    DATE:   12/17/2018            MRN: [id number]     PATIENT NAME:  Ajay Kiser              Diagnosis:  S/P CVA with tPA administration     Total Visits to Date: 2  Cancels/No shows to Date: 1/2    Dear Dr. Kwame Chávez MD    This is to notify you that per our physical therapy department policy your patient, noted above, is being discharged from Physical Therapy due to the following reason(s):       · Pt's last contact with PT department was on 7/25/18. He then missed his next 3 consecutive visits. This episode of care with now be completed due to non-compliance. Pt is currently active again with physical therapy at our [facility] location with his last visit being on 12/13/18. If you have any questions, please feel free to contact me at [phone].     Thank You,    Electronically Signed by:   Yissel Arshad DPT        [id number]  12/17/2018

## 2018-12-18 ENCOUNTER — HOSPITAL ENCOUNTER (OUTPATIENT)
Dept: PHYSICAL THERAPY | Age: 68
Setting detail: THERAPIES SERIES
Discharge: HOME | End: 2018-12-18
Payer: MEDICARE

## 2019-01-03 LAB
EKG ATRIAL RATE: 75 BPM
EKG P AXIS: 66 DEGREES
EKG P-R INTERVAL: 184 MS
EKG Q-T INTERVAL: 414 MS
EKG QRS DURATION: 104 MS
EKG QTC CALCULATION (BAZETT): 462 MS
EKG R AXIS: -39 DEGREES
EKG T AXIS: 50 DEGREES
EKG VENTRICULAR RATE: 75 BPM

## 2019-01-08 ENCOUNTER — CARE COORDINATION (OUTPATIENT)
Dept: CARE COORDINATION | Age: 69
End: 2019-01-08

## 2019-01-17 DIAGNOSIS — G45.9 TIA (TRANSIENT ISCHEMIC ATTACK): ICD-10-CM

## 2019-01-17 DIAGNOSIS — E66.9 DIABETES MELLITUS TYPE 2 IN OBESE (HCC): Chronic | ICD-10-CM

## 2019-01-17 DIAGNOSIS — E11.69 DIABETES MELLITUS TYPE 2 IN OBESE (HCC): Chronic | ICD-10-CM

## 2019-01-17 DIAGNOSIS — Q21.12 PFO (PATENT FORAMEN OVALE): Primary | ICD-10-CM

## 2019-01-24 NOTE — PROGRESS NOTES
RESULTS IN EPIC
in the upcoming weeks. He will require Plavix loading prior to the closure. He will require dual antiplatelet therapy for 6 months. SBE prophylaxis for 6 months post closure. Blood pressure is well controlled. I therefore made no medication changes today, which include the following:   metFORMIN (GLUCOPHAGE) 500 MG tablet Take 1 tablet by mouth 2 times daily (with meals)   aspirin 81 MG tablet Take 1 tablet by mouth daily Last dose 4/26/15   terazosin (HYTRIN) 1 MG capsule Take 1 tab at night   meclizine (ANTIVERT) 25 MG tablet Take 1 tablet by mouth daily as needed for Dizziness   blood glucose test strips (EXACTECH TEST) strip Use to test blood sugars twice daily, morning and evening before eating meals. Diag 250.00 provide brand covered by insurance   sildenafil (REVATIO) 20 MG tablet Take 3 tablets 1 hour prior to sexual intercourse. atorvastatin (LIPITOR) 40 MG tablet Take 1 tablet by mouth nightly   Lancets MISC Use to test blood sugar twice daily   Blood Glucose Monitoring Suppl ARMANDO One glucose meter per insurance coverage, measure blood sugar twice daily morning and evening before eating meals. Diag 250.00   oxyCODONE (ROXICODONE) 5 MG immediate release tablet Take 5 mg by mouth every evening. [facility]. oxycodone (OXYCONTIN) 40 MG CR tablet 80 mg in am; 60 mg in pm-ordered through [facility]. If he remains cardiovascularly stable, he will be seen back in our office 4 weeks post closure.       RPV/tlr

## 2019-01-28 ENCOUNTER — TELEPHONE (OUTPATIENT)
Dept: INTERNAL MEDICINE | Age: 69
End: 2019-01-28

## 2019-01-29 ENCOUNTER — CARE COORDINATION (OUTPATIENT)
Dept: FAMILY MEDICINE CLINIC | Age: 69
End: 2019-01-29

## 2019-01-31 DIAGNOSIS — R30.9 PAIN WITH URINATION: Primary | ICD-10-CM

## 2019-02-01 ENCOUNTER — TELEPHONE (OUTPATIENT)
Dept: INTERNAL MEDICINE | Age: 69
End: 2019-02-01

## 2019-02-04 ENCOUNTER — CARE COORDINATION (OUTPATIENT)
Dept: FAMILY MEDICINE CLINIC | Age: 69
End: 2019-02-04

## 2019-02-04 ENCOUNTER — OFFICE VISIT (OUTPATIENT)
Dept: INTERNAL MEDICINE | Age: 69
End: 2019-02-04
Payer: MEDICARE

## 2019-02-04 VITALS — RESPIRATION RATE: 20 BRPM | HEIGHT: 74 IN | WEIGHT: 315 LBS | BODY MASS INDEX: 40.43 KG/M2

## 2019-02-04 DIAGNOSIS — E78.5 HYPERLIPIDEMIA, UNSPECIFIED HYPERLIPIDEMIA TYPE: ICD-10-CM

## 2019-02-04 DIAGNOSIS — Z23 NEED FOR PROPHYLACTIC VACCINATION AGAINST DIPHTHERIA-TETANUS-PERTUSSIS (DTP): ICD-10-CM

## 2019-02-04 DIAGNOSIS — N39.8 VOIDING DYSFUNCTION: Chronic | ICD-10-CM

## 2019-02-04 DIAGNOSIS — Z23 NEED FOR PROPHYLACTIC VACCINATION AGAINST STREPTOCOCCUS PNEUMONIAE (PNEUMOCOCCUS): ICD-10-CM

## 2019-02-04 DIAGNOSIS — E66.9 DIABETES MELLITUS TYPE 2 IN OBESE (HCC): Chronic | ICD-10-CM

## 2019-02-04 DIAGNOSIS — E66.01 MORBID OBESITY WITH BMI OF 45.0-49.9, ADULT (HCC): ICD-10-CM

## 2019-02-04 DIAGNOSIS — Z23 NEED FOR PROPHYLACTIC VACCINATION AND INOCULATION AGAINST VARICELLA: ICD-10-CM

## 2019-02-04 DIAGNOSIS — E11.69 DIABETES MELLITUS TYPE 2 IN OBESE (HCC): Chronic | ICD-10-CM

## 2019-02-04 LAB — HBA1C MFR BLD: 6.6 %

## 2019-02-04 PROCEDURE — 99213 OFFICE O/P EST LOW 20 MIN: CPT | Performed by: INTERNAL MEDICINE

## 2019-02-04 PROCEDURE — 3044F HG A1C LEVEL LT 7.0%: CPT | Performed by: INTERNAL MEDICINE

## 2019-02-04 PROCEDURE — 4040F PNEUMOC VAC/ADMIN/RCVD: CPT | Performed by: INTERNAL MEDICINE

## 2019-02-04 PROCEDURE — 1123F ACP DISCUSS/DSCN MKR DOCD: CPT | Performed by: INTERNAL MEDICINE

## 2019-02-04 PROCEDURE — G8427 DOCREV CUR MEDS BY ELIG CLIN: HCPCS | Performed by: INTERNAL MEDICINE

## 2019-02-04 PROCEDURE — 3017F COLORECTAL CA SCREEN DOC REV: CPT | Performed by: INTERNAL MEDICINE

## 2019-02-04 PROCEDURE — 1036F TOBACCO NON-USER: CPT | Performed by: INTERNAL MEDICINE

## 2019-02-04 PROCEDURE — G8417 CALC BMI ABV UP PARAM F/U: HCPCS | Performed by: INTERNAL MEDICINE

## 2019-02-04 PROCEDURE — 83036 HEMOGLOBIN GLYCOSYLATED A1C: CPT | Performed by: INTERNAL MEDICINE

## 2019-02-04 PROCEDURE — 2022F DILAT RTA XM EVC RTNOPTHY: CPT | Performed by: INTERNAL MEDICINE

## 2019-02-04 PROCEDURE — G8484 FLU IMMUNIZE NO ADMIN: HCPCS | Performed by: INTERNAL MEDICINE

## 2019-02-04 PROCEDURE — 1101F PT FALLS ASSESS-DOCD LE1/YR: CPT | Performed by: INTERNAL MEDICINE

## 2019-02-04 PROCEDURE — G8598 ASA/ANTIPLAT THER USED: HCPCS | Performed by: INTERNAL MEDICINE

## 2019-02-04 RX ORDER — LANCETS 30 GAUGE
EACH MISCELLANEOUS
Qty: 100 EACH | Refills: 3 | Status: SHIPPED | OUTPATIENT
Start: 2019-02-04 | End: 2019-05-03 | Stop reason: SDUPTHER

## 2019-02-04 RX ORDER — CLOPIDOGREL BISULFATE 75 MG/1
75 TABLET ORAL DAILY
Qty: 30 TABLET | Refills: 3 | Status: SHIPPED | OUTPATIENT
Start: 2019-02-04 | End: 2019-03-13 | Stop reason: SDUPTHER

## 2019-02-04 RX ORDER — SILDENAFIL CITRATE 20 MG/1
20 TABLET ORAL DAILY PRN
Qty: 10 TABLET | Refills: 0 | Status: SHIPPED | OUTPATIENT
Start: 2019-02-04 | End: 2019-05-03 | Stop reason: SDUPTHER

## 2019-02-04 RX ORDER — TERAZOSIN 1 MG/1
CAPSULE ORAL
Qty: 30 CAPSULE | Refills: 3 | Status: SHIPPED | OUTPATIENT
Start: 2019-02-04 | End: 2019-05-03 | Stop reason: SDUPTHER

## 2019-02-04 RX ORDER — ATORVASTATIN CALCIUM 40 MG/1
40 TABLET, FILM COATED ORAL NIGHTLY
Qty: 30 TABLET | Refills: 3 | Status: SHIPPED | OUTPATIENT
Start: 2019-02-04 | End: 2019-03-14 | Stop reason: SDUPTHER

## 2019-02-04 RX ORDER — GLUCOSAMINE HCL/CHONDROITIN SU 500-400 MG
CAPSULE ORAL
Qty: 100 STRIP | Refills: 3 | Status: SHIPPED | OUTPATIENT
Start: 2019-02-04 | End: 2019-05-03 | Stop reason: SDUPTHER

## 2019-02-04 RX ORDER — MECLIZINE HYDROCHLORIDE 25 MG/1
25 TABLET ORAL DAILY PRN
Qty: 30 TABLET | Status: CANCELLED | OUTPATIENT
Start: 2019-02-04

## 2019-02-04 ASSESSMENT — ENCOUNTER SYMPTOMS
COUGH: 0
EYE DISCHARGE: 0
BLOOD IN STOOL: 0
SHORTNESS OF BREATH: 0
ABDOMINAL PAIN: 0
NAUSEA: 0
VOMITING: 0
SORE THROAT: 0
DIARRHEA: 0
CONSTIPATION: 0

## 2019-02-05 PROBLEM — N52.9 ERECTILE DYSFUNCTION: Status: ACTIVE | Noted: 2019-02-05

## 2019-02-11 ENCOUNTER — HOSPITAL ENCOUNTER (OUTPATIENT)
Age: 69
Discharge: HOME | End: 2019-02-11
Payer: MEDICARE

## 2019-02-11 DIAGNOSIS — Q21.12 PFO (PATENT FORAMEN OVALE): ICD-10-CM

## 2019-02-11 DIAGNOSIS — R30.9 PAIN WITH URINATION: ICD-10-CM

## 2019-02-11 DIAGNOSIS — G45.9 TIA (TRANSIENT ISCHEMIC ATTACK): ICD-10-CM

## 2019-02-11 DIAGNOSIS — E11.69 DIABETES MELLITUS TYPE 2 IN OBESE (HCC): Chronic | ICD-10-CM

## 2019-02-11 DIAGNOSIS — E66.9 DIABETES MELLITUS TYPE 2 IN OBESE (HCC): Chronic | ICD-10-CM

## 2019-02-11 LAB
ANION GAP SERPL CALCULATED.3IONS-SCNC: 13 MMOL/L (ref 7–16)
BILIRUBIN URINE: NEGATIVE
BLOOD, URINE: NEGATIVE
BUN BLDV-MCNC: 14 MG/DL (ref 8–23)
CALCIUM SERPL-MCNC: 8.8 MG/DL (ref 8.6–10.2)
CHLORIDE BLD-SCNC: 106 MMOL/L (ref 98–107)
CLARITY: CLEAR
CO2: 24 MMOL/L (ref 22–29)
COLOR: YELLOW
CREAT SERPL-MCNC: 1 MG/DL (ref 0.7–1.2)
GFR AFRICAN AMERICAN: >60
GFR NON-AFRICAN AMERICAN: >60 ML/MIN/1.73
GLUCOSE BLD-MCNC: 161 MG/DL (ref 74–99)
GLUCOSE URINE: NEGATIVE MG/DL
HCT VFR BLD CALC: 39 % (ref 37–54)
HEMOGLOBIN: 12.3 G/DL (ref 12.5–16.5)
KETONES, URINE: NEGATIVE MG/DL
LEUKOCYTE ESTERASE, URINE: NEGATIVE
MCH RBC QN AUTO: 28.8 PG (ref 26–35)
MCHC RBC AUTO-ENTMCNC: 31.5 % (ref 32–34.5)
MCV RBC AUTO: 91.3 FL (ref 80–99.9)
NITRITE, URINE: NEGATIVE
PDW BLD-RTO: 12.5 FL (ref 11.5–15)
PH UA: 5.5 (ref 5–9)
PLATELET # BLD: 306 E9/L (ref 130–450)
PMV BLD AUTO: 9.4 FL (ref 7–12)
POTASSIUM SERPL-SCNC: 4.5 MMOL/L (ref 3.5–5)
PROTEIN UA: NEGATIVE MG/DL
RBC # BLD: 4.27 E12/L (ref 3.8–5.8)
SODIUM BLD-SCNC: 143 MMOL/L (ref 132–146)
SPECIFIC GRAVITY UA: 1.02 (ref 1–1.03)
UROBILINOGEN, URINE: 0.2 E.U./DL
WBC # BLD: 6.5 E9/L (ref 4.5–11.5)

## 2019-02-11 PROCEDURE — 80048 BASIC METABOLIC PNL TOTAL CA: CPT

## 2019-02-11 PROCEDURE — 85027 COMPLETE CBC AUTOMATED: CPT

## 2019-02-11 PROCEDURE — 81003 URINALYSIS AUTO W/O SCOPE: CPT

## 2019-02-11 PROCEDURE — 36415 COLL VENOUS BLD VENIPUNCTURE: CPT

## 2019-02-14 ENCOUNTER — HOSPITAL ENCOUNTER (OUTPATIENT)
Dept: CARDIAC CATH/INVASIVE PROCEDURES | Age: 69
Setting detail: OBSERVATION
Discharge: HOME | End: 2019-02-14
Attending: INTERNAL MEDICINE | Admitting: INTERNAL MEDICINE
Payer: MEDICARE

## 2019-02-14 VITALS
HEIGHT: 74 IN | TEMPERATURE: 97.1 F | SYSTOLIC BLOOD PRESSURE: 126 MMHG | OXYGEN SATURATION: 97 % | RESPIRATION RATE: 20 BRPM | HEART RATE: 60 BPM | BODY MASS INDEX: 40.43 KG/M2 | DIASTOLIC BLOOD PRESSURE: 78 MMHG | WEIGHT: 315 LBS

## 2019-02-14 DIAGNOSIS — Q21.12 PFO (PATENT FORAMEN OVALE): ICD-10-CM

## 2019-02-14 LAB
ABO/RH: NORMAL
ABO/RH: NORMAL
ANTIBODY SCREEN: NORMAL
INR BLD: 1.1
POC ACT LR: 123 SECONDS
POC ACT LR: 171 SECONDS
POC ACT LR: 244 SECONDS
POC ACT LR: 293 SECONDS
PROTHROMBIN TIME: 12.2 SEC (ref 9.3–12.4)

## 2019-02-14 PROCEDURE — G0378 HOSPITAL OBSERVATION PER HR: HCPCS

## 2019-02-14 PROCEDURE — 93662 INTRACARDIAC ECG (ICE): CPT | Performed by: INTERNAL MEDICINE

## 2019-02-14 PROCEDURE — 86900 BLOOD TYPING SEROLOGIC ABO: CPT

## 2019-02-14 PROCEDURE — 2580000003 HC RX 258: Performed by: INTERNAL MEDICINE

## 2019-02-14 PROCEDURE — C1769 GUIDE WIRE: HCPCS

## 2019-02-14 PROCEDURE — C1759 CATH, INTRA ECHOCARDIOGRAPHY: HCPCS

## 2019-02-14 PROCEDURE — C1817 SEPTAL DEFECT IMP SYS: HCPCS

## 2019-02-14 PROCEDURE — 93580 TRANSCATH CLOSURE OF ASD: CPT | Performed by: INTERNAL MEDICINE

## 2019-02-14 PROCEDURE — 2709999900 HC NON-CHARGEABLE SUPPLY

## 2019-02-14 PROCEDURE — 6370000000 HC RX 637 (ALT 250 FOR IP): Performed by: INTERNAL MEDICINE

## 2019-02-14 PROCEDURE — 6360000002 HC RX W HCPCS

## 2019-02-14 PROCEDURE — 85610 PROTHROMBIN TIME: CPT

## 2019-02-14 PROCEDURE — C1894 INTRO/SHEATH, NON-LASER: HCPCS

## 2019-02-14 PROCEDURE — 36415 COLL VENOUS BLD VENIPUNCTURE: CPT

## 2019-02-14 PROCEDURE — 93005 ELECTROCARDIOGRAM TRACING: CPT | Performed by: INTERNAL MEDICINE

## 2019-02-14 PROCEDURE — 93308 TTE F-UP OR LMTD: CPT

## 2019-02-14 PROCEDURE — 86901 BLOOD TYPING SEROLOGIC RH(D): CPT

## 2019-02-14 PROCEDURE — 2500000003 HC RX 250 WO HCPCS

## 2019-02-14 PROCEDURE — 86850 RBC ANTIBODY SCREEN: CPT

## 2019-02-14 PROCEDURE — 85347 COAGULATION TIME ACTIVATED: CPT

## 2019-02-14 RX ORDER — SODIUM CHLORIDE 9 MG/ML
INJECTION, SOLUTION INTRAVENOUS ONCE
Status: COMPLETED | OUTPATIENT
Start: 2019-02-14 | End: 2019-02-14

## 2019-02-14 RX ORDER — OXYCODONE HYDROCHLORIDE 5 MG/1
5 TABLET ORAL EVERY EVENING
Status: DISCONTINUED | OUTPATIENT
Start: 2019-02-14 | End: 2019-02-14 | Stop reason: HOSPADM

## 2019-02-14 RX ORDER — ONDANSETRON 2 MG/ML
4 INJECTION INTRAMUSCULAR; INTRAVENOUS EVERY 6 HOURS PRN
Status: DISCONTINUED | OUTPATIENT
Start: 2019-02-14 | End: 2019-02-14 | Stop reason: HOSPADM

## 2019-02-14 RX ORDER — ASPIRIN 81 MG/1
243 TABLET, CHEWABLE ORAL ONCE
Status: DISCONTINUED | OUTPATIENT
Start: 2019-02-14 | End: 2019-02-14 | Stop reason: ALTCHOICE

## 2019-02-14 RX ORDER — ASPIRIN 81 MG/1
81 TABLET, CHEWABLE ORAL DAILY
Status: DISCONTINUED | OUTPATIENT
Start: 2019-02-15 | End: 2019-02-14 | Stop reason: HOSPADM

## 2019-02-14 RX ORDER — ACETAMINOPHEN 325 MG/1
650 TABLET ORAL EVERY 4 HOURS PRN
Status: DISCONTINUED | OUTPATIENT
Start: 2019-02-14 | End: 2019-02-14 | Stop reason: HOSPADM

## 2019-02-14 RX ORDER — SODIUM CHLORIDE 0.9 % (FLUSH) 0.9 %
10 SYRINGE (ML) INJECTION PRN
Status: DISCONTINUED | OUTPATIENT
Start: 2019-02-14 | End: 2019-02-14 | Stop reason: HOSPADM

## 2019-02-14 RX ORDER — SODIUM CHLORIDE 0.9 % (FLUSH) 0.9 %
10 SYRINGE (ML) INJECTION EVERY 12 HOURS SCHEDULED
Status: DISCONTINUED | OUTPATIENT
Start: 2019-02-14 | End: 2019-02-14 | Stop reason: HOSPADM

## 2019-02-14 RX ORDER — CLOPIDOGREL BISULFATE 75 MG/1
75 TABLET ORAL DAILY
Status: DISCONTINUED | OUTPATIENT
Start: 2019-02-14 | End: 2019-02-14 | Stop reason: HOSPADM

## 2019-02-14 RX ORDER — SILDENAFIL CITRATE 20 MG/1
20 TABLET ORAL DAILY PRN
Status: DISCONTINUED | OUTPATIENT
Start: 2019-02-14 | End: 2019-02-14 | Stop reason: HOSPADM

## 2019-02-14 RX ORDER — ATORVASTATIN CALCIUM 40 MG/1
40 TABLET, FILM COATED ORAL NIGHTLY
Status: DISCONTINUED | OUTPATIENT
Start: 2019-02-14 | End: 2019-02-14 | Stop reason: HOSPADM

## 2019-02-14 RX ORDER — OXYCODONE HYDROCHLORIDE 80 MG/1
80 TABLET, FILM COATED, EXTENDED RELEASE ORAL EVERY 12 HOURS SCHEDULED
Status: DISCONTINUED | OUTPATIENT
Start: 2019-02-14 | End: 2019-02-14 | Stop reason: HOSPADM

## 2019-02-14 RX ORDER — ASPIRIN 81 MG/1
243 TABLET, CHEWABLE ORAL ONCE
Status: ON HOLD | COMMUNITY
End: 2019-02-14 | Stop reason: HOSPADM

## 2019-02-14 RX ORDER — MECLIZINE HCL 12.5 MG/1
25 TABLET ORAL DAILY PRN
Status: DISCONTINUED | OUTPATIENT
Start: 2019-02-14 | End: 2019-02-14 | Stop reason: HOSPADM

## 2019-02-14 RX ADMIN — OXYCODONE HYDROCHLORIDE 5 MG: 5 TABLET ORAL at 17:40

## 2019-02-14 RX ADMIN — SODIUM CHLORIDE: 9 INJECTION, SOLUTION INTRAVENOUS at 07:15

## 2019-02-14 RX ADMIN — SODIUM CHLORIDE: 9 INJECTION, SOLUTION INTRAVENOUS at 07:26

## 2019-02-14 ASSESSMENT — PAIN SCALES - GENERAL
PAINLEVEL_OUTOF10: 0
PAINLEVEL_OUTOF10: 9

## 2019-02-15 LAB
EKG ATRIAL RATE: 55 BPM
EKG P AXIS: 63 DEGREES
EKG P-R INTERVAL: 164 MS
EKG Q-T INTERVAL: 454 MS
EKG QRS DURATION: 106 MS
EKG QTC CALCULATION (BAZETT): 434 MS
EKG R AXIS: -39 DEGREES
EKG T AXIS: 0 DEGREES
EKG VENTRICULAR RATE: 55 BPM

## 2019-02-21 ENCOUNTER — TELEPHONE (OUTPATIENT)
Dept: CARDIOLOGY CLINIC | Age: 69
End: 2019-02-21

## 2019-02-22 ENCOUNTER — CARE COORDINATION (OUTPATIENT)
Dept: FAMILY MEDICINE CLINIC | Age: 69
End: 2019-02-22

## 2019-03-13 ENCOUNTER — TELEPHONE (OUTPATIENT)
Dept: CARDIOLOGY CLINIC | Age: 69
End: 2019-03-13

## 2019-03-13 ENCOUNTER — HOSPITAL ENCOUNTER (OUTPATIENT)
Dept: CARDIOLOGY | Age: 69
Discharge: HOME | End: 2019-03-13
Payer: MEDICARE

## 2019-03-13 DIAGNOSIS — I63.9 ACUTE CVA (CEREBROVASCULAR ACCIDENT) (HCC): ICD-10-CM

## 2019-03-13 DIAGNOSIS — Q21.12 PFO (PATENT FORAMEN OVALE): ICD-10-CM

## 2019-03-13 DIAGNOSIS — E66.9 DIABETES MELLITUS TYPE 2 IN OBESE (HCC): Chronic | ICD-10-CM

## 2019-03-13 DIAGNOSIS — E11.69 DIABETES MELLITUS TYPE 2 IN OBESE (HCC): Chronic | ICD-10-CM

## 2019-03-13 PROCEDURE — 2580000003 HC RX 258: Performed by: INTERNAL MEDICINE

## 2019-03-13 PROCEDURE — 93308 TTE F-UP OR LMTD: CPT

## 2019-03-13 RX ORDER — SODIUM CHLORIDE 0.9 % (FLUSH) 0.9 %
10 SYRINGE (ML) INJECTION PRN
Status: DISCONTINUED | OUTPATIENT
Start: 2019-03-13 | End: 2019-03-14 | Stop reason: HOSPADM

## 2019-03-13 RX ORDER — CLOPIDOGREL BISULFATE 75 MG/1
75 TABLET ORAL DAILY
Qty: 30 TABLET | Refills: 11 | Status: SHIPPED | OUTPATIENT
Start: 2019-03-13 | End: 2019-09-16 | Stop reason: ALTCHOICE

## 2019-03-13 RX ADMIN — Medication 10 ML: at 14:15

## 2019-03-13 RX ADMIN — Medication 10 ML: at 14:13

## 2019-03-14 ENCOUNTER — TELEPHONE (OUTPATIENT)
Dept: CARDIOLOGY CLINIC | Age: 69
End: 2019-03-14

## 2019-03-14 DIAGNOSIS — E11.69 DIABETES MELLITUS TYPE 2 IN OBESE (HCC): Chronic | ICD-10-CM

## 2019-03-14 DIAGNOSIS — E66.9 DIABETES MELLITUS TYPE 2 IN OBESE (HCC): Chronic | ICD-10-CM

## 2019-03-14 DIAGNOSIS — E78.5 HYPERLIPIDEMIA, UNSPECIFIED HYPERLIPIDEMIA TYPE: ICD-10-CM

## 2019-03-19 ENCOUNTER — OFFICE VISIT (OUTPATIENT)
Dept: NEUROLOGY | Age: 69
End: 2019-03-19
Payer: MEDICARE

## 2019-03-19 ENCOUNTER — CARE COORDINATION (OUTPATIENT)
Dept: CARE COORDINATION | Age: 69
End: 2019-03-19

## 2019-03-19 VITALS
OXYGEN SATURATION: 94 % | HEART RATE: 70 BPM | HEIGHT: 73 IN | TEMPERATURE: 97.3 F | WEIGHT: 315 LBS | RESPIRATION RATE: 12 BRPM | BODY MASS INDEX: 41.75 KG/M2 | SYSTOLIC BLOOD PRESSURE: 136 MMHG | DIASTOLIC BLOOD PRESSURE: 86 MMHG

## 2019-03-19 DIAGNOSIS — I63.412 CEREBROVASCULAR ACCIDENT (CVA) DUE TO EMBOLISM OF LEFT MIDDLE CEREBRAL ARTERY (HCC): Primary | ICD-10-CM

## 2019-03-19 PROCEDURE — 1101F PT FALLS ASSESS-DOCD LE1/YR: CPT | Performed by: NURSE PRACTITIONER

## 2019-03-19 PROCEDURE — G8484 FLU IMMUNIZE NO ADMIN: HCPCS | Performed by: NURSE PRACTITIONER

## 2019-03-19 PROCEDURE — G8417 CALC BMI ABV UP PARAM F/U: HCPCS | Performed by: NURSE PRACTITIONER

## 2019-03-19 PROCEDURE — 4004F PT TOBACCO SCREEN RCVD TLK: CPT | Performed by: NURSE PRACTITIONER

## 2019-03-19 PROCEDURE — 3017F COLORECTAL CA SCREEN DOC REV: CPT | Performed by: NURSE PRACTITIONER

## 2019-03-19 PROCEDURE — 1123F ACP DISCUSS/DSCN MKR DOCD: CPT | Performed by: NURSE PRACTITIONER

## 2019-03-19 PROCEDURE — G8598 ASA/ANTIPLAT THER USED: HCPCS | Performed by: NURSE PRACTITIONER

## 2019-03-19 PROCEDURE — G8427 DOCREV CUR MEDS BY ELIG CLIN: HCPCS | Performed by: NURSE PRACTITIONER

## 2019-03-19 PROCEDURE — 4040F PNEUMOC VAC/ADMIN/RCVD: CPT | Performed by: NURSE PRACTITIONER

## 2019-03-19 PROCEDURE — 99214 OFFICE O/P EST MOD 30 MIN: CPT | Performed by: NURSE PRACTITIONER

## 2019-03-19 RX ORDER — ATORVASTATIN CALCIUM 40 MG/1
40 TABLET, FILM COATED ORAL NIGHTLY
Qty: 90 TABLET | Refills: 0 | Status: SHIPPED | OUTPATIENT
Start: 2019-03-19 | End: 2019-05-03 | Stop reason: SDUPTHER

## 2019-03-19 SDOH — HEALTH STABILITY: MENTAL HEALTH: HOW OFTEN DO YOU HAVE A DRINK CONTAINING ALCOHOL?: MONTHLY OR LESS

## 2019-04-03 ENCOUNTER — TELEPHONE (OUTPATIENT)
Dept: INTERNAL MEDICINE | Age: 69
End: 2019-04-03

## 2019-04-03 ENCOUNTER — CARE COORDINATION (OUTPATIENT)
Dept: CARE COORDINATION | Age: 69
End: 2019-04-03

## 2019-04-03 NOTE — CARE COORDINATION
Message left on patients phone asking how he is doing, if he has any needs or questions and asked that he call back at their earliest convenience with contact information given.

## 2019-04-03 NOTE — CARE COORDINATION
information mailed to patient  Confidence: /10  Anticipated Goal Completion Date: 6/1/19         Patient Stated (pt-stated)   Improving     To loose 10lbs a month    Barriers: physical  Plan for overcoming my barriers: control calorie intake, increase activity  Confidence: 6/10  Anticipated Goal Completion Date: 6/1/19            Prior to Admission medications    Medication Sig Start Date End Date Taking? Authorizing Provider   atorvastatin (LIPITOR) 40 MG tablet Take 1 tablet by mouth nightly 3/19/19  Yes Velasquez Schultz DO   metFORMIN (GLUCOPHAGE) 500 MG tablet Take 1 tablet by mouth 2 times daily (with meals) 3/19/19  Yes Velasquez Schultz,    clopidogrel (PLAVIX) 75 MG tablet Take 1 tablet by mouth daily 3/13/19  Yes Magdalena Tellez MD   aspirin 81 MG tablet Take 1 tablet by mouth daily Last dose 4/26/15 2/4/19  Yes Niko Otero MD   terazosin (HYTRIN) 1 MG capsule Take 1 tab at night 2/4/19  Yes Niko Otero MD   sildenafil (REVATIO) 20 MG tablet Take 1 tablet by mouth daily as needed (intercourse) Take 3 tablets an hour prior to intercourse 2/4/19  Yes Niko Otero MD   meclizine (ANTIVERT) 25 MG tablet Take 1 tablet by mouth daily as needed for Dizziness 9/10/18  Yes Niko Otero MD   oxyCODONE (ROXICODONE) 5 MG immediate release tablet Take 5 mg by mouth every evening. [facility]. Yes Historical Provider, MD   oxycodone (OXYCONTIN) 40 MG CR tablet 80 mg in am; 60 mg in pm-ordered through [facility]. Yes Historical Provider, MD   blood glucose monitor strips Test 1 time  2-3 times per week & as needed for symptoms of irregular blood glucose. 2/4/19   Niko Otero MD   Lancets MISC Use to test blood sugar twice daily 2/4/19   Niko Otero MD   blood glucose test strips [facility] TEST) strip Use to test blood sugars twice daily, morning and evening before eating meals.  Diag 250.00 provide brand covered by insurance 9/10/18   Niko Otero MD   Blood Glucose Monitoring Suppl ARMANDO One glucose meter per insurance coverage, measure blood sugar twice daily morning and evening before eating meals. Diag 250.00 2/20/15   James Mariee MD       Future Appointments   Date Time Provider Daniela Waltoni   4/8/2019  1:00 PM Magdalena Tellez MD [facility] CARDIO [facility]   5/3/2019 10:00 AM Robinson Ruiz MD ACC [facility]   9/10/2019  1:20 PM KVNG Constantino - CNP [facility] Neuro [facility]     ,   Diabetes Assessment    Medic Alert ID:  No  Meal Planning:  Avoidance of concentrated sweets   How often do you test your blood sugar?:  Daily   Do you have barriers with adherence to non-pharmacologic self-management interventions?  (Nutrition/Exercise/Self-Monitoring):  Yes   Have you ever had to go to the ED for symptoms of low blood sugar?:  No       Do you have hyperglycemia symptoms?:  No   Do you have hypoglycemia symptoms?:  No   Blood Sugar Monitoring Regimen:  Not Testing       and   General Assessment    Do you have any symptoms that are causing concern?:  No

## 2019-04-08 ENCOUNTER — OFFICE VISIT (OUTPATIENT)
Dept: CARDIOLOGY CLINIC | Age: 69
End: 2019-04-08
Payer: MEDICARE

## 2019-04-08 VITALS
BODY MASS INDEX: 40.43 KG/M2 | SYSTOLIC BLOOD PRESSURE: 130 MMHG | DIASTOLIC BLOOD PRESSURE: 70 MMHG | WEIGHT: 315 LBS | HEART RATE: 62 BPM | RESPIRATION RATE: 16 BRPM | HEIGHT: 74 IN

## 2019-04-08 DIAGNOSIS — I63.9 CRYPTOGENIC STROKE (HCC): Primary | ICD-10-CM

## 2019-04-08 DIAGNOSIS — Z87.74 S/P PERCUTANEOUS PATENT FORAMEN OVALE CLOSURE: ICD-10-CM

## 2019-04-08 PROCEDURE — 1123F ACP DISCUSS/DSCN MKR DOCD: CPT | Performed by: INTERNAL MEDICINE

## 2019-04-08 PROCEDURE — 3017F COLORECTAL CA SCREEN DOC REV: CPT | Performed by: INTERNAL MEDICINE

## 2019-04-08 PROCEDURE — G8417 CALC BMI ABV UP PARAM F/U: HCPCS | Performed by: INTERNAL MEDICINE

## 2019-04-08 PROCEDURE — 99214 OFFICE O/P EST MOD 30 MIN: CPT | Performed by: INTERNAL MEDICINE

## 2019-04-08 PROCEDURE — 4004F PT TOBACCO SCREEN RCVD TLK: CPT | Performed by: INTERNAL MEDICINE

## 2019-04-08 PROCEDURE — 4040F PNEUMOC VAC/ADMIN/RCVD: CPT | Performed by: INTERNAL MEDICINE

## 2019-04-08 PROCEDURE — G8427 DOCREV CUR MEDS BY ELIG CLIN: HCPCS | Performed by: INTERNAL MEDICINE

## 2019-04-08 PROCEDURE — G8598 ASA/ANTIPLAT THER USED: HCPCS | Performed by: INTERNAL MEDICINE

## 2019-04-08 SDOH — HEALTH STABILITY: MENTAL HEALTH: HOW MANY STANDARD DRINKS CONTAINING ALCOHOL DO YOU HAVE ON A TYPICAL DAY?: 1 OR 2

## 2019-04-08 NOTE — PATIENT INSTRUCTIONS
Patient Education        A Healthy Heart: Care Instructions  Your Care Instructions    Heart disease occurs when a substance called plaque builds up in the vessels that supply oxygen-rich blood to your heart. This can narrow the blood vessels and reduce blood flow. A heart attack happens when blood flow is completely blocked. A high-fat diet, smoking, and other factors increase the risk of heart disease. Your doctor has found that you have a chance of having heart disease. You can do lots of things to keep your heart healthy. It may not be easy, but you can change your diet, exercise more, and quit smoking. These steps really work to lower your chance of heart disease. Follow-up care is a key part of your treatment and safety. Be sure to make and go to all appointments, and call your doctor if you are having problems. It's also a good idea to know your test results and keep a list of the medicines you take. How can you care for yourself at home? Diet    · Use less salt when you cook and eat. This helps lower your blood pressure. Taste food before salting. Add only a little salt when you think you need it. With time, your taste buds will adjust to less salt.     · Eat fewer snack items, fast foods, canned soups, and other high-salt, high-fat, processed foods.     · Read food labels and try to avoid saturated and trans fats. They increase your risk of heart disease by raising cholesterol levels.     · Limit the amount of solid fat-butter, margarine, and shortening-you eat. Use olive, peanut, or canola oil when you cook. Bake, broil, and steam foods instead of frying them.     · Eating fish can lower your risk for heart disease. Eat at least 2 servings of fish a week. [place], mackerel, herring, sardines, and chunk light tuna are very good choices. These fish contain omega-3 fatty acids.     · Eat a variety of fruit and vegetables every day.  Dark green, deep orange, red, or yellow fruits and vegetables are the  may tell you to chew 1 adult-strength or 2 to 4 low-dose aspirin. Wait for an ambulance. Do not try to drive yourself.   Watch closely for changes in your health, and be sure to contact your doctor if you have any problems. Where can you learn more? Go to https://chpepiceweb.[email or web address]-tag. org and sign in to your [employer] account. Enter E689 in the [employer] box to learn more about \"A Healthy Heart: Care Instructions. \"     If you do not have an account, please click on the \"Sign Up Now\" link. Current as of: July 22, 2018  Content Version: 11.9  © 1816-9061 [employer]. Care instructions adapted under license by [facility] ([facility]). If you have questions about a medical condition or this instruction, always ask your healthcare professional. [address] any warranty or liability for your use of this information.

## 2019-04-08 NOTE — PROGRESS NOTES
' [facility]  Lucio Porter, MD Kristeen Abide, MD Eward Najjar, MD Maryrose Casey. Vidya Rome MD                Ameena Woods   1950  Cristóbal Colón MD      Mr. Ameena Woods was in our outpatient office on 04/08/2019 for follow up of his PFO closure. This 61-year-old obese gentleman has history of diabetes, hypertension, sleep apnea, CVA and a documented PFO. He underwent closure of the PFO percutaneously on 02/14/2019. One-month echo in mid March revealed well seated device with no residual shunt. He presents to our office today for a follow-up visit. Note no new cardiac complaints. Denies any chest pain or shortness of breath. Denies orthopnea, PND or lower extremity edema. He thinks that his energy level has improved somewhat post closure. PAST MEDICAL HISTORY:  1. Allergy to ketorolac sodium (Toradol). 2. Morbid obesity. BMI 47.  3. 14-pack-year smoking history; smokes about 1/2 PPD presently. 4. Poorly controlled diabetes. 5. Dyslipidemia. 6. Obstructive sleep apnea; noncompliant with CPAP therapy. 7. Erectile dysfunction. 8. Medication noncompliance. 9. Presentation 06/24/2018 with right upper extremity weakness. Initial CT negative but CT perfusion revealed right MCA territory ischemia. He received tPA. Repeat CT 24 hours later ws normal.  MRI, however, showed left frontal lobe       cortical stroke. CTA of neck showed no extracranial carotid stenosis. Monitor in the hospital revealed no AF. Discharged on medical therapy. 10. Echocardiogram with Bubble study 06/26/2018. Dr. Zamzam Bell.  EF 60-65%. Mild LVH. Normal RV size and function. No significant valvular pathology. No shunting on Bubble study. 11. 14 day Holter monitor, 9/2018, no evidence of A. Fib. Sinus rhythm with PAC's predominantly. 12. THIERNO, 10/1/2018, Dr. Zamzam Bell.  EF is normal.  Positive bubble study with right-to-left intraatrial shunting. No evidence of left atrial thrombus. Mild MR. 13. Percutaneous PFO closure 02/14/2019 with a 35 mm Amplatzer device. 14. Limited echo 02/14/2019 showed normal EF, Amplatzer device noted without any pericardial effusion. 15. Limited echo with Bubble study 03/13/2019. EF normal.  Amplatzer device with no shunt demonstrated by color Doppler or Bubble study, both at rest or in Valsalva. Review of Systems:  HEENT: negative for acute visual and auditory problems  Constitutional: negative for fever and chills  Respiratory: negative for cough and hemoptysis  Cardiovascular: as per HPI  Gastrointestinal: negative for abdominal pain, diarrhea, nausea and vomiting  Genitourinary: negative for dysuria and hematuria  Derm: negative for rash and skin lesion(s)  Neurological: negative for seizures and tremors  Endocrine: negative for diabetic symptoms including polydipsia and polyuria  Musculoskeletal: negative for CTD  Psychiatric: negative for anxiety and major depression    On exam today, he is a middle-aged obese gentleman in no particular distress. His blood pressure is 130/70. His pulse is 62. His respiration is 16. He weighs 362 pounds. BMI is 46. HEENT: Conjunctiva pink, sclerae anicteric, mucous membranes are moist.  Neck: No JVD could be appreciated. Carotid upstrokes normal.  No bruits. Chest expands normally. No intercostal retractions. Cardiovascular exam reveals normal S1 and S2, regular rate and rhythm. No murmurs, rubs or gallops noted. Lungs have good air entry bilaterally without any creps, rhonchi or wheezes. Abdomen is soft, nontender with intact bowel sounds. Extremities have no edema. Distal pulses are normal bilaterally. Bilateral groins soft without any ecchymosis or hematoma. Skin has no rashes. Neurologically, oriented x3. Psyche, he is pleasant. Back has no tenderness. Muscle tone is normal.      Mr. Lynnette Blair was in our outpatient office for follow up of his PFO closure. He tolerated the procedure fairly well.   Echo a few

## 2019-04-29 ENCOUNTER — TELEPHONE (OUTPATIENT)
Dept: INTERNAL MEDICINE | Age: 69
End: 2019-04-29

## 2019-04-29 ENCOUNTER — CARE COORDINATION (OUTPATIENT)
Dept: CARE COORDINATION | Age: 69
End: 2019-04-29

## 2019-04-29 DIAGNOSIS — E11.69 DIABETES MELLITUS TYPE 2 IN OBESE (HCC): Primary | ICD-10-CM

## 2019-04-29 DIAGNOSIS — E66.9 DIABETES MELLITUS TYPE 2 IN OBESE (HCC): Primary | ICD-10-CM

## 2019-04-29 DIAGNOSIS — N39.498 OTHER URINARY INCONTINENCE: ICD-10-CM

## 2019-04-29 NOTE — CARE COORDINATION
Ambulatory Care Coordination Note  4/29/2019  CM Risk Score: 2  Anthony Mortality Risk Score: 6    ACC: Andres Walton, RN    Summary Note:   Damien Melendez called ACC to ask about lab work for office visit and for diabetes follow up. He state his blood sugars have been 170-200's and he is disappointed with himself. He states he was not watching what he ate but states he is trying to \"get back on track\". He is scheduled for an office visit 5/3/19 and was asking if he needs to have labs drawn before his visit, and he also asked if he can have \"kidney blood work\". ACC will consult office. He denies needing any financial assistance at this time. PLAN  Follow up at PCP visit to review blood sugars and diet, and assess care coordination needs. Care Coordination Interventions    Program Enrollment:  Rising Risk  Referral from Primary Care Provider:  Yes  Suggested Interventions and [facility]:  Completed (Comment: sees [address]  [phone])  Diabetes Education:  Completed  Medication Assistance Program:  Completed (Comment: Not sure if get assistance with Extra Help but will call [employer] for clarification. states that  has not part D- mailed to patient information about Extra Help and part D  assistance contact information for computor/phone access)  Smoking Cessation: In Process (Comment: patient to consider Smoking Cessation program)  Zone Management Tools:  Completed (Comment: diabetes)         Goals Addressed                 This Visit's Progress     Conditions and Symptoms   On track     I will follow my Zone Management tool to seek urgent or emergent care. I will notify my provider of any symptoms that indicate a worsening of my condition.     Barriers: none  Plan for overcoming my barriers: read information mailed to patient  Confidence: /10  Anticipated Goal Completion Date: 6/1/19         Patient Stated (pt-stated)   Improving     To loose 10lbs a month    Barriers: physical  Plan for overcoming my barriers: control calorie intake, increase activity  Confidence: 6/10  Anticipated Goal Completion Date: 6/1/19            Prior to Admission medications    Medication Sig Start Date End Date Taking? Authorizing Provider   atorvastatin (LIPITOR) 40 MG tablet Take 1 tablet by mouth nightly 3/19/19   Ulice Oz, DO   metFORMIN (GLUCOPHAGE) 500 MG tablet Take 1 tablet by mouth 2 times daily (with meals) 3/19/19   Ulice Oz, DO   clopidogrel (PLAVIX) 75 MG tablet Take 1 tablet by mouth daily 3/13/19   Jackline Nyhan, MD   blood glucose monitor strips Test 1 time  2-3 times per week & as needed for symptoms of irregular blood glucose. 2/4/19   Renea Torres MD   aspirin 81 MG tablet Take 1 tablet by mouth daily Last dose 4/26/15 2/4/19   Renea Torres MD   Lancets MISC Use to test blood sugar twice daily 2/4/19   Renea Torres MD   terazosin (HYTRIN) 1 MG capsule Take 1 tab at night 2/4/19   Renea Torres MD   sildenafil (REVATIO) 20 MG tablet Take 1 tablet by mouth daily as needed (intercourse) Take 3 tablets an hour prior to intercourse 2/4/19   Renea Torres MD   meclizine (ANTIVERT) 25 MG tablet Take 1 tablet by mouth daily as needed for Dizziness 9/10/18   Renea Torres MD   blood glucose test strips [facility] [place] TEST) strip Use to test blood sugars twice daily, morning and evening before eating meals. Diag 250.00 provide brand covered by insurance 9/10/18   Renea Torres MD   Blood Glucose Monitoring Suppl ARMANDO One glucose meter per insurance coverage, measure blood sugar twice daily morning and evening before eating meals. Diag 250.00 2/20/15   Bebe Smith MD   oxyCODONE (ROXICODONE) 5 MG immediate release tablet Take 5 mg by mouth every evening. [facility] through [facility].     Historical Provider, MD   oxycodone (OXYCONTIN) 40 MG CR tablet 80 mg in am; 60 mg in pm-ordered

## 2019-04-29 NOTE — PROGRESS NOTES
Patient called requesting lab work to be ordered prior to AWV on 5/3/19  Patient also requesting lab work for increased incontinence  Spoke with DR. NORA Miller, order hemoglobin a1c,,bmp,urine for microalbuminuria,routine urine with micro,diagnostic PSA v.o. /Lior RN  Inform patient that his AWV might need changed to visit to address urinary problems

## 2019-04-29 NOTE — TELEPHONE ENCOUNTER
Spoke with patient regarding prescription for lab work,patient requests prescriptions be faxed to main lab on [address] and he will go tomorrow .   Prescriptions faxed as requested/

## 2019-04-30 ENCOUNTER — HOSPITAL ENCOUNTER (OUTPATIENT)
Age: 69
Discharge: HOME | End: 2019-04-30
Payer: MEDICARE

## 2019-04-30 DIAGNOSIS — E11.69 DIABETES MELLITUS TYPE 2 IN OBESE (HCC): ICD-10-CM

## 2019-04-30 DIAGNOSIS — N39.498 OTHER URINARY INCONTINENCE: ICD-10-CM

## 2019-04-30 DIAGNOSIS — E66.9 DIABETES MELLITUS TYPE 2 IN OBESE (HCC): ICD-10-CM

## 2019-04-30 LAB
ANION GAP SERPL CALCULATED.3IONS-SCNC: 11 MMOL/L (ref 7–16)
BILIRUBIN URINE: NEGATIVE
BLOOD, URINE: NEGATIVE
BUN BLDV-MCNC: 11 MG/DL (ref 8–23)
CALCIUM SERPL-MCNC: 8.7 MG/DL (ref 8.6–10.2)
CHLORIDE BLD-SCNC: 102 MMOL/L (ref 98–107)
CLARITY: CLEAR
CO2: 24 MMOL/L (ref 22–29)
COLOR: YELLOW
CREAT SERPL-MCNC: 1 MG/DL (ref 0.7–1.2)
CREATININE URINE: 238 MG/DL (ref 40–278)
GFR AFRICAN AMERICAN: >60
GFR NON-AFRICAN AMERICAN: >60 ML/MIN/1.73
GLUCOSE BLD-MCNC: 180 MG/DL (ref 74–99)
GLUCOSE URINE: NEGATIVE MG/DL
HBA1C MFR BLD: 6.9 % (ref 4–5.6)
KETONES, URINE: NEGATIVE MG/DL
LEUKOCYTE ESTERASE, URINE: NEGATIVE
MICROALBUMIN UR-MCNC: 21.8 MG/L
MICROALBUMIN/CREAT UR-RTO: 9.2 (ref 0–30)
NITRITE, URINE: NEGATIVE
PH UA: 5.5 (ref 5–9)
POTASSIUM SERPL-SCNC: 4.5 MMOL/L (ref 3.5–5)
PROSTATE SPECIFIC ANTIGEN: 0.18 NG/ML (ref 0–4)
PROTEIN UA: NEGATIVE MG/DL
SODIUM BLD-SCNC: 137 MMOL/L (ref 132–146)
SPECIFIC GRAVITY UA: 1.02 (ref 1–1.03)
UROBILINOGEN, URINE: 0.2 E.U./DL

## 2019-04-30 PROCEDURE — 81003 URINALYSIS AUTO W/O SCOPE: CPT

## 2019-04-30 PROCEDURE — 82570 ASSAY OF URINE CREATININE: CPT

## 2019-04-30 PROCEDURE — 82044 UR ALBUMIN SEMIQUANTITATIVE: CPT

## 2019-04-30 PROCEDURE — 84153 ASSAY OF PSA TOTAL: CPT

## 2019-04-30 PROCEDURE — 36415 COLL VENOUS BLD VENIPUNCTURE: CPT

## 2019-04-30 PROCEDURE — 80048 BASIC METABOLIC PNL TOTAL CA: CPT

## 2019-04-30 PROCEDURE — 83036 HEMOGLOBIN GLYCOSYLATED A1C: CPT

## 2019-05-03 ENCOUNTER — OFFICE VISIT (OUTPATIENT)
Dept: INTERNAL MEDICINE | Age: 69
End: 2019-05-03
Payer: MEDICARE

## 2019-05-03 VITALS
BODY MASS INDEX: 40.43 KG/M2 | WEIGHT: 315 LBS | HEART RATE: 69 BPM | RESPIRATION RATE: 16 BRPM | DIASTOLIC BLOOD PRESSURE: 59 MMHG | HEIGHT: 74 IN | SYSTOLIC BLOOD PRESSURE: 132 MMHG | TEMPERATURE: 98.2 F

## 2019-05-03 DIAGNOSIS — E11.69 DIABETES MELLITUS TYPE 2 IN OBESE (HCC): Chronic | ICD-10-CM

## 2019-05-03 DIAGNOSIS — Z23 NEED FOR PROPHYLACTIC VACCINATION AGAINST STREPTOCOCCUS PNEUMONIAE (PNEUMOCOCCUS): ICD-10-CM

## 2019-05-03 DIAGNOSIS — N39.8 VOIDING DYSFUNCTION: Chronic | ICD-10-CM

## 2019-05-03 DIAGNOSIS — Z00.00 ROUTINE GENERAL MEDICAL EXAMINATION AT A HEALTH CARE FACILITY: ICD-10-CM

## 2019-05-03 DIAGNOSIS — E78.5 HYPERLIPIDEMIA, UNSPECIFIED HYPERLIPIDEMIA TYPE: ICD-10-CM

## 2019-05-03 DIAGNOSIS — E66.9 DIABETES MELLITUS TYPE 2 IN OBESE (HCC): Chronic | ICD-10-CM

## 2019-05-03 PROCEDURE — 3017F COLORECTAL CA SCREEN DOC REV: CPT | Performed by: INTERNAL MEDICINE

## 2019-05-03 PROCEDURE — G0438 PPPS, INITIAL VISIT: HCPCS | Performed by: INTERNAL MEDICINE

## 2019-05-03 PROCEDURE — G0009 ADMIN PNEUMOCOCCAL VACCINE: HCPCS

## 2019-05-03 PROCEDURE — 6360000002 HC RX W HCPCS

## 2019-05-03 PROCEDURE — G8598 ASA/ANTIPLAT THER USED: HCPCS | Performed by: INTERNAL MEDICINE

## 2019-05-03 PROCEDURE — 90670 PCV13 VACCINE IM: CPT

## 2019-05-03 PROCEDURE — 3044F HG A1C LEVEL LT 7.0%: CPT | Performed by: INTERNAL MEDICINE

## 2019-05-03 PROCEDURE — 99212 OFFICE O/P EST SF 10 MIN: CPT | Performed by: INTERNAL MEDICINE

## 2019-05-03 PROCEDURE — 1123F ACP DISCUSS/DSCN MKR DOCD: CPT | Performed by: INTERNAL MEDICINE

## 2019-05-03 PROCEDURE — 4040F PNEUMOC VAC/ADMIN/RCVD: CPT | Performed by: INTERNAL MEDICINE

## 2019-05-03 RX ORDER — MECLIZINE HYDROCHLORIDE 25 MG/1
25 TABLET ORAL DAILY PRN
Qty: 30 TABLET | Refills: 3 | Status: SHIPPED
Start: 2019-05-03 | End: 2020-04-08 | Stop reason: SDUPTHER

## 2019-05-03 RX ORDER — SILDENAFIL CITRATE 20 MG/1
20 TABLET ORAL DAILY PRN
Qty: 10 TABLET | Refills: 3 | Status: ON HOLD
Start: 2019-05-03 | End: 2020-05-08 | Stop reason: HOSPADM

## 2019-05-03 RX ORDER — GLUCOSAMINE HCL/CHONDROITIN SU 500-400 MG
CAPSULE ORAL
Qty: 100 STRIP | Refills: 3 | Status: SHIPPED | OUTPATIENT
Start: 2019-05-03 | End: 2019-05-03 | Stop reason: SDUPTHER

## 2019-05-03 RX ORDER — TERAZOSIN 1 MG/1
CAPSULE ORAL
Qty: 30 CAPSULE | Refills: 3 | Status: SHIPPED
Start: 2019-05-03 | End: 2020-02-06

## 2019-05-03 RX ORDER — SILDENAFIL 50 MG/1
50 TABLET, FILM COATED ORAL DAILY PRN
Qty: 10 TABLET | Refills: 0 | Status: ON HOLD
Start: 2019-05-03 | End: 2020-05-07 | Stop reason: ALTCHOICE

## 2019-05-03 RX ORDER — ATORVASTATIN CALCIUM 40 MG/1
40 TABLET, FILM COATED ORAL NIGHTLY
Qty: 90 TABLET | Refills: 1 | Status: SHIPPED | OUTPATIENT
Start: 2019-05-03 | End: 2019-09-16 | Stop reason: SDUPTHER

## 2019-05-03 RX ORDER — LANCETS 30 GAUGE
EACH MISCELLANEOUS
Qty: 100 EACH | Refills: 3 | Status: SHIPPED | OUTPATIENT
Start: 2019-05-03 | End: 2019-09-10

## 2019-05-03 RX ORDER — GLUCOSAMINE HCL/CHONDROITIN SU 500-400 MG
CAPSULE ORAL
Qty: 100 STRIP | Refills: 3 | Status: SHIPPED | OUTPATIENT
Start: 2019-05-03 | End: 2019-08-26 | Stop reason: SDUPTHER

## 2019-05-03 ASSESSMENT — LIFESTYLE VARIABLES
HOW MANY STANDARD DRINKS CONTAINING ALCOHOL DO YOU HAVE ON A TYPICAL DAY: 0
HAS A RELATIVE, FRIEND, DOCTOR, OR ANOTHER HEALTH PROFESSIONAL EXPRESSED CONCERN ABOUT YOUR DRINKING OR SUGGESTED YOU CUT DOWN: 0
AUDIT-C TOTAL SCORE: 2
HOW OFTEN DO YOU HAVE A DRINK CONTAINING ALCOHOL: 2
HOW OFTEN DO YOU HAVE SIX OR MORE DRINKS ON ONE OCCASION: 0
AUDIT TOTAL SCORE: 2
HOW OFTEN DURING THE LAST YEAR HAVE YOU HAD A FEELING OF GUILT OR REMORSE AFTER DRINKING: 0
HOW OFTEN DURING THE LAST YEAR HAVE YOU FAILED TO DO WHAT WAS NORMALLY EXPECTED FROM YOU BECAUSE OF DRINKING: 0
HOW OFTEN DURING THE LAST YEAR HAVE YOU NEEDED AN ALCOHOLIC DRINK FIRST THING IN THE MORNING TO GET YOURSELF GOING AFTER A NIGHT OF HEAVY DRINKING: 0
HAVE YOU OR SOMEONE ELSE BEEN INJURED AS A RESULT OF YOUR DRINKING: 0
HOW OFTEN DURING THE LAST YEAR HAVE YOU BEEN UNABLE TO REMEMBER WHAT HAPPENED THE NIGHT BEFORE BECAUSE YOU HAD BEEN DRINKING: 0
HOW OFTEN DURING THE LAST YEAR HAVE YOU FOUND THAT YOU WERE NOT ABLE TO STOP DRINKING ONCE YOU HAD STARTED: 0

## 2019-05-03 ASSESSMENT — ANXIETY QUESTIONNAIRES: GAD7 TOTAL SCORE: 2

## 2019-05-03 ASSESSMENT — PATIENT HEALTH QUESTIONNAIRE - PHQ9
SUM OF ALL RESPONSES TO PHQ QUESTIONS 1-9: 2
SUM OF ALL RESPONSES TO PHQ QUESTIONS 1-9: 2

## 2019-05-03 NOTE — PROGRESS NOTES
evening before eating meals. Diag 250.00 Yes Jessica Simon MD   oxyCODONE (ROXICODONE) 5 MG immediate release tablet Take 5 mg by mouth every evening. [facility] through [facility]. Yes Historical Provider, MD   oxycodone (OXYCONTIN) 40 MG CR tablet 80 mg in am; 60 mg in pm-ordered through [facility]. Yes Historical Provider, MD   terazosin (HYTRIN) 1 MG capsule Take 1 tab at night  Osman Renner MD     Past Medical History:   Diagnosis Date    Arthritis     Back pain 2005    injured back at work. on disability, CHRONIC , HERNIATED LUMBAR, H/O EPIDURAL INJECTIONS    Cerebral artery occlusion with cerebral infarction ([address]     6-24-18 - trouble talking, stutters right hand weak     Depression     pt states he s doing well at this time.  off his meds    Diabetes mellitus ([address]     Hx of blood clots     Hyperlipidemia     controlled     PFO (patent foramen ovale)     Sleep apnea     ordered cpap, but pt does not use it    Testicular mass     LEFT, removed surgically 2015      Past Surgical History:   Procedure Laterality Date    ACHILLES TENDON SURGERY  1975    left foot, [place], [place]    COLONOSCOPY  1990s    multiple polyps (x 3), [facility]    COLONOSCOPY  7/28/2015    incomplete colonoscopy to mid ascending colon, BE xray ordered, Dr. Makayla Moon, [address]      right eye sty removed    Tanner Riddle  5/1/2002    Dr. Makayla Moon, [facility]  10/24/2012    left hydrocelectomy, Dr. Ralph Capps, [place]   [address]    right knee (pathology unknown), [facility]. [address] BLOCK  2/24/14    bilateral transforaminal nerve block lumbar #1    NERVE BLOCK  3/10/14    bilateral, transforaminal nerve block #2    OTHER SURGICAL HISTORY  6/19/2013    excisional dermal cyst face, Dr. Makayla Moon, [address] HISTORY  02/14/2019    Dr Jaron Roth - PFO Closure - Amplatzer Occluder 35mm    TESTICLE REMOVAL  6/19/2013    left testicle, Dr. Ralph Capps, [facility] TONSILLECTOMY  1950s    TRANSESOPHAGEAL ECHOCARDIOGRAM  10/01/2018     Family History   Problem Relation Age of Onset    Diabetes Father     Diabetes Maternal Grandmother        CareTeam (Including outside providers/suppliers regularly involved in providing care):   Patient Care Team:  An Cohen MD as PCP - General (Internal Medicine)  Driss Wan DO as PCP - [facility] Attributed Provider  Mohamud Watt MD as Consulting Physician (Neurosurgery)  Radha Stokes DO as Consulting Physician (Pain Management)  Jania Luna MD as Consulting Physician (Cardiology)  Milagro Tinajero RN as Care Coordinator    Wt Readings from Last 3 Encounters:   05/03/19 (!) 358 lb 1.6 oz (162.4 kg)   04/08/19 (!) 362 lb 12.8 oz (164.6 kg)   03/19/19 (!) 365 lb 14.4 oz (166 kg)     Vitals:    05/03/19 1020   BP: (!) 132/59   Site: Right Upper Arm   Position: Sitting   Cuff Size: Large Adult   Pulse: 69   Resp: 16   Temp: 98.2 °F (36.8 °C)   TempSrc: Oral   Weight: (!) 358 lb 1.6 oz (162.4 kg)   Height: 6' 1.75\" (1.873 m)     Body mass index is 46.29 kg/m². Based upon direct observation of the patient, evaluation of cognition reveals recent and remote memory intact.     General Appearance: alert and oriented to person, place and time, well developed and well- nourished, in no acute distress, morbidly obese  Skin: warm and dry, no rash or erythema  Head: normocephalic and atraumatic  Eyes: pupils equal, round, and reactive to light, extraocular eye movements intact, conjunctivae normal  ENT: tympanic membrane, external ear and ear canal normal bilaterally, nose without deformity, nasal mucosa and turbinates normal without polyps  Neck: supple and non-tender without mass, no thyromegaly or thyroid nodules, no cervical lymphadenopathy  Pulmonary/Chest: clear to auscultation bilaterally- no wheezes, rales or rhonchi, normal air movement, no respiratory distress  Cardiovascular: normal rate, regular rhythm, normal S1 and S2, no murmurs, rubs, clicks, or gallops, distal pulses intact, no carotid bruits  Abdomen: soft, non-tender, non-distended, normal bowel sounds, no masses or organomegaly  Extremities: no cyanosis, clubbing or edema  Musculoskeletal: normal range of motion, no joint swelling, deformity or tenderness  Neurologic: reflexes normal and symmetric, no cranial nerve deficit, gait, coordination and speech normal    Patient's complete Health Risk Assessment and screening values have been reviewed and are found in Flowsheets. The following problems were reviewed today and where indicated follow up appointments were made and/or referrals ordered.     Positive Risk Factor Screenings with Interventions:     Substance Abuse:  Social History     Tobacco History     Smoking Status  Current Every Day Smoker Smoking Start Date  1/1/2004 Smoking Frequency  0.5 packs/day for 14 years (7 pk yrs) Smoking Tobacco Type  Cigarettes, Cigars    Smokeless Tobacco Use  Never Used    Tobacco Comment  currently 5 cigarettes daily          Alcohol History     Alcohol Use Status  Yes Drinks/Week  0 Standard drinks or equivalent per week Amount  0.0 oz alcohol/wk Comment  rare          Drug Use     Drug Use Status  Never          Sexual Activity     Sexually Active  Yes Partners  Female               Audit Questionnaire: Screen for Alcohol Misuse  How often do you have a drink containing alcohol?: Two to four times a month  How many standard drinks containing alcohol do you have on a typical day when drinking?: One or two  How often do you have six or more drinks on one occasion?: Never  Audit-C Score: 2  During the past year, how often have you found that you were not able to stop drinking once you had started?: Never  During the past year, how often have you failed to do what was normally expected of you because of drinking?: Never  During the past year, how often have you needed a drink in the morning to get yourself going after a heavy drinking session?: Never  During the past year, how often have you had a feeling of guilt or remorse after drinking?: Never  During the past year, have you been unable to remember what happened the night before because you had been drinking?: Never  Have you or someone else been injured as a result of your drinking?: No  Has a relative or friend, doctor or health worker been concerned about your drinking or suggested you cut down?: No  Total Score: 2  Substance Abuse Interventions:  · Tobacco abuse:  tobacco cessation tips and resources provided    General Health:  General  In general, how would you say your health is?: Fair  In the past 7 days, have you experienced any of the following? New or Increased Pain, New or Increased Fatigue, Loneliness, Social Isolation, Stress or Anger?: (!) Stress  Do you get the social and emotional support that you need?: (!) No  Do you have a Living Will?: (!) No  General Health Risk Interventions:  · Stress: patient's comments regarding reasons for stress and/or anger: pain, obesity    Health Habits/Nutrition:  Health Habits/Nutrition  Do you exercise for at least 20 minutes 2-3 times per week?: (!) No  Have you lost any weight without trying in the past 3 months?: No  Do you eat fewer than 2 meals per day?: No  Have you seen a dentist within the past year?: (!) No  Body mass index is 46.29 kg/m².   Health Habits/Nutrition Interventions:  · Inadequate physical activity:  patient agrees to exercise for at least 150 minutes/week  · Nutritional issues:  educational materials for healthy, well-balanced diet provided  · Dental exam overdue:  patient encouraged to make appointment with his/her dentist    Hearing/Vision:  Hearing/Vision  Do you or your family notice any trouble with your hearing?: (!) Yes  Do you have difficulty driving, watching TV, or doing any of your daily activities because of your eyesight?: No  Have you had an eye exam within the past year?: (!) No  Hearing/Vision Interventions:  · Vision concerns:  patient encouraged to make appointment with his/her eye specialist    Safety:  Safety  Do you have working smoke detectors?: Yes  Have all throw rugs been removed or fastened?: (!) No  Do you have non-slip mats in all bathtubs?: Yes  Do all of your stairways have a railing or banister?: Yes  Are your doorways, halls and stairs free of clutter?: Yes  Do you always fasten your seatbelt when you are in a car?: (!) No  Safety Interventions:  · Home safety tips provided    ADL:  ADLs  In the past 7 days, did you need help from others to perform any of the following everyday activities? Eating, dressing, grooming, bathing, toileting, or walking/balance?: None  In the past 7 days, did you need help from others to take care of any of the following?  Laundry, housekeeping, banking/finances, shopping, telephone use, food preparation, transportation, or taking medications?: [employer], Housekeeping, Food Preparation  ADL Interventions:  · Patient declines any further evaluation/treatment for this issue    Personalized Preventive Plan   Current Health Maintenance Status  Immunization History   Administered Date(s) Administered    Hepatitis A 12/15/2000    Hepatitis B, unspecified formulation 12/15/2000    Influenza Virus Vaccine 01/12/2016    Influenza, Nila Gamma, 6 mo and older, IM (Flulaval) 11/01/2017    Influenza, Triv, 3 Years and older, IM (Afluria (5 yrs and older) 09/20/2016    Pneumococcal Polysaccharide ([id number]) 09/20/2016        Health Maintenance   Topic Date Due    Diabetic retinal exam  12/18/1962    DTaP/Tdap/Td vaccine (1 - Tdap) 12/18/1971    Shingles Vaccine (1 of 2) 12/18/2002    Pneumococcal 65+ years Vaccine (1 of 2 - PCV13) 12/18/2017    Diabetic foot exam  01/24/2018    Flu vaccine (Season Ended) 09/01/2019    Lipid screen  11/14/2019    A1C test (Diabetic or Prediabetic)  04/30/2020    Diabetic microalbuminuria test  04/30/2020    Colon cancer screen colonoscopy  07/28/2025    AAA screen  Completed    Hepatitis C screen  Completed     Recommendations for Preventive Services Due: see orders and patient instructions/AVS.    Obesity: increasing physical activity. Dietary changes recommended. Referral to diabetic education. ASCVD risk: already on high intensity statin. Tobacco use: Have been cutting down on smoking. Immnunizations: for pneumonia vaccination with PCV 13   .   Recommended screening schedule for the next 5-10 years is provided to the patient in written form: see Patient Instructions/AVS.

## 2019-05-03 NOTE — PATIENT INSTRUCTIONS
Patient Education        Preventing Falls: Care Instructions  Your Care Instructions    Getting around your home safely can be a challenge if you have injuries or health problems that make it easy for you to fall. Loose rugs and furniture in walkways are among the dangers for many older people who have problems walking or who have poor eyesight. People who have conditions such as arthritis, osteoporosis, or dementia also have to be careful not to fall. You can make your home safer with a few simple measures. Follow-up care is a key part of your treatment and safety. Be sure to make and go to all appointments, and call your doctor if you are having problems. It's also a good idea to know your test results and keep a list of the medicines you take. How can you care for yourself at home? Taking care of yourself  · You may get dizzy if you do not drink enough water. To prevent dehydration, drink plenty of fluids, enough so that your urine is light yellow or clear like water. Choose water and other caffeine-free clear liquids. If you have kidney, heart, or liver disease and have to limit fluids, talk with your doctor before you increase the amount of fluids you drink. · Exercise regularly to improve your strength, muscle tone, and balance. Walk if you can. Swimming may be a good choice if you cannot walk easily. · Have your vision and hearing checked each year or any time you notice a change. If you have trouble seeing and hearing, you might not be able to avoid objects and could lose your balance. · Know the side effects of the medicines you take. Ask your doctor or pharmacist whether the medicines you take can affect your balance. Sleeping pills or sedatives can affect your balance. · Limit the amount of alcohol you drink. Alcohol can impair your balance and other senses. · Ask your doctor whether calluses or corns on your feet need to be removed.  If you wear loose-fitting shoes because of calluses or corns, you can lose your balance and fall. · Talk to your doctor if you have numbness in your feet. Preventing falls at home  · Remove raised doorway thresholds, throw rugs, and clutter. Repair loose carpet or raised areas in the floor. · Move furniture and electrical cords to keep them out of walking paths. · Use nonskid floor wax, and wipe up spills right away, especially on ceramic tile floors. · If you use a walker or cane, put rubber tips on it. If you use crutches, clean the bottoms of them regularly with an abrasive pad, such as steel wool. · Keep your house well lit, especially Timothy Handler, and outside walkways. Use night-lights in areas such as hallways and bathrooms. Add extra light switches or use remote switches (such as switches that go on or off when you clap your hands) to make it easier to turn lights on if you have to get up during the night. · Install sturdy handrails on stairways. · Move items in your cabinets so that the things you use a lot are on the lower shelves (about waist level). · Keep a cordless phone and a flashlight with new batteries by your bed. If possible, put a phone in each of the main rooms of your house, or carry a cell phone in case you fall and cannot reach a phone. Or, you can wear a device around your neck or wrist. You push a button that sends a signal for help. · Wear low-heeled shoes that fit well and give your feet good support. Use footwear with nonskid soles. Check the heels and soles of your shoes for wear. Repair or replace worn heels or soles. · Do not wear socks without shoes on wood floors. · Walk on the grass when the sidewalks are slippery. If you live in an area that gets snow and ice in the winter, sprinkle salt on slippery steps and sidewalks. Preventing falls in the bath  · Install grab bars and nonskid mats inside and outside your shower or tub and near the toilet and sinks. · Use shower chairs and bath benches.   · Use a hand-held shower head that will allow you to sit while showering. · Get into a tub or shower by putting the weaker leg in first. Get out of a tub or shower with your strong side first.  · Repair loose toilet seats and consider installing a raised toilet seat to make getting on and off the toilet easier. · Keep your bathroom door unlocked while you are in the shower. Where can you learn more? Go to https://X[employer]alapepicewSalesforce Radian6.Bahu. org and sign in to your [employer] account. Enter 0476 [id number] in the [employer] box to learn more about \"Preventing Falls: Care Instructions. \"     If you do not have an account, please click on the \"Sign Up Now\" link. Current as of: March 15, 2018  Content Version: 11.9  © 7532-4568 [employer], [employer]. Care instructions adapted under license by [facility] ([facility]). If you have questions about a medical condition or this instruction, always ask your healthcare professional. [address] any warranty or liability for your use of this information. Patient Education        DASH Diet: Care Instructions  Your Care Instructions    The DASH diet is an eating plan that can help lower your blood pressure. DASH stands for Dietary Approaches to Stop Hypertension. Hypertension is high blood pressure. The DASH diet focuses on eating foods that are high in calcium, potassium, and magnesium. These nutrients can lower blood pressure. The foods that are highest in these nutrients are fruits, vegetables, low-fat dairy products, nuts, seeds, and legumes. But taking calcium, potassium, and magnesium supplements instead of eating foods that are high in those nutrients does not have the same effect. The DASH diet also includes whole grains, fish, and poultry. The DASH diet is one of several lifestyle changes your doctor may recommend to lower your high blood pressure. Your doctor may also want you to decrease the amount of sodium in your diet.  Lowering sodium while following the DASH diet dramatic changes to your diet all at once. You might feel that you are missing out on your favorite foods and then be more likely to not follow the plan. Make small changes, and stick with them. Once those changes become habit, add a few more changes. · Try some of the following:  ? Make it a goal to eat a fruit or vegetable at every meal and at snacks. This will make it easy to get the recommended amount of fruits and vegetables each day. ? Try yogurt topped with fruit and nuts for a snack or healthy dessert. ? Add lettuce, tomato, cucumber, and onion to sandwiches. ? Combine a ready-made pizza crust with low-fat mozzarella cheese and lots of vegetable toppings. Try using tomatoes, squash, spinach, broccoli, carrots, cauliflower, and onions. ? Have a variety of cut-up vegetables with a low-fat dip as an appetizer instead of chips and dip. ? Sprinkle sunflower seeds or chopped almonds over salads. Or try adding chopped walnuts or almonds to cooked vegetables. ? Try some vegetarian meals using beans and peas. Add garbanzo or kidney beans to salads. Make burritos and tacos with mashed romero beans or black beans. Where can you learn more? Go to https://Ar[employer] Capitalpewolf[employer] Solutions.Atmosferiq. org and sign in to your [employer] account. Enter V126 in the [facility] box to learn more about \"DASH Diet: Care Instructions. \"     If you do not have an account, please click on the \"Sign Up Now\" link. Current as of: July 22, 2018  Content Version: 11.9  © 3515-4278 [employer], [employer]. Care instructions adapted under license by [facility] ([facility]). If you have questions about a medical condition or this instruction, always ask your healthcare professional. [address] any warranty or liability for your use of this information. Continue all medications for now. Follow-up in 3 months.     Dennis Gautam MD, PGY3  Internal Medicine Resident  5/3/2019    Personalized Preventive Plan for Sherlyn Lopez

## 2019-05-03 NOTE — PROGRESS NOTES
prevnar 13 vaccine administered in clinic without difficulty vis reviewed prior and given to pt   All instructions given to pt by dr Racheal Hudson pt instructed to stop at the  to  his printed avs   Instructed to call in June to schedule his fu 3 month appt

## 2019-05-03 NOTE — PROGRESS NOTES
[place] met with patient as SW is covering for  [facility]. Staffed with Care Coordinator, Jens barahona who reports that patient continues to bring up issues with bills but unsure of what specifically he is talking about. He was instructed to bring these bills into his appointment today to address further. GEOVANNA met with patient alone in room. He reports that he at times will get random bills one being $17 all the way to $90 and does not understand why. Financial aid application just submitted on 5/1/19 per records. He did not bring any of the bills he was supposed to and states a lot has gone into collections from the past and unsure how to deal with this currently. MARY explained that financial aid can be retroactive in this respect. Explained to him the need to bring bills in to further identify current vs past needs/options. Told him to notify SW when he hears from [employer] and explained process of notifying appropriate sources if approved. He endorsed understanding and agrees to do so. Will notify Rafael Gold to f/u.

## 2019-05-31 ENCOUNTER — CARE COORDINATION (OUTPATIENT)
Dept: CARE COORDINATION | Age: 69
End: 2019-05-31

## 2019-05-31 ENCOUNTER — TELEPHONE (OUTPATIENT)
Dept: INTERNAL MEDICINE | Age: 69
End: 2019-05-31

## 2019-05-31 NOTE — TELEPHONE ENCOUNTER
Spoke with pt by phone per care coordinator due to medical bill concerns. pt is considering making appointment due to his increased blood sugars but not for a week; offered to make appt or to come as walkin which he would not commit to.   LSW advised pt to come in with bills and proof of income for assistance which he states he will ; requested for pt to call to set time or will see when comes for IM appt

## 2019-05-31 NOTE — CARE COORDINATION
Ambulatory Care Coordination Note  5/31/2019  CM Risk Score: 2  Anthony Mortality Risk Score: 6    ACC: Clark Barnett RN    Summary Note:   Jessica Vick states he saw [facility] phone number on his caller ID and returned call for diabetes follow up. He states he has been stressed and his blood sugars have been elevated 164-262. He reports he has not been eating sweets or drinking soda. He states he is taking his medications as prescribed. He is discouraged because he has been gaining weight. He is complaining of his ears being \"plugged\" since he had his pneumonia shot last office visit. He states his chronic pain is increasing because his pain medications are not being covered by his workers comp, he reports he met with his . ACC encouraged Jessica Vick to come in for an office visit for his blood sugars. PLAN  Send message to front office to schedule appointment. Follow up in 1 week and review blood sugars and care coordination needs with next interaction. Care Coordination Interventions    Program Enrollment:  Rising Risk  Referral from Primary Care Provider:  Yes  Suggested Interventions and [address] [place] [address]:  Completed (Comment: sees [facility]- [facility]  [phone])  Diabetes Education:  Declined (Comment: states he was not up to going 5/31/19)  Medication Assistance Program:  Completed (Comment: Not sure if get assistance with Extra Help but will call [employer] for clarification. states that  has not part D- mailed to patient information about Extra Help and part D  assistance contact information for computor/phone access)  Smoking Cessation: In Process (Comment: patient to consider Smoking Cessation program)  Zone Management Tools:  Completed (Comment: diabetes)         Goals Addressed                 This Visit's Progress     Conditions and Symptoms   On track     I will follow my Zone Management tool to seek urgent or emergent care.   I will notify my provider of any symptoms that indicate a worsening of my condition. Barriers: none  Plan for overcoming my barriers: read information mailed to patient  Confidence: /10  Anticipated Goal Completion Date: 6/1/19         Patient Stated (pt-stated)   No change     To loose 10lbs a month    Barriers: physical  Plan for overcoming my barriers: control calorie intake, increase activity  Confidence: 6/10  Anticipated Goal Completion Date: 6/1/19 5/31/19 patient has not been able to loose any weight. Prior to Admission medications    Medication Sig Start Date End Date Taking? Authorizing Provider   atorvastatin (LIPITOR) 40 MG tablet Take 1 tablet by mouth nightly 5/3/19   Darlene Rodas MD   metFORMIN (GLUCOPHAGE) 500 MG tablet Take 1 tablet by mouth 2 times daily (with meals) 5/3/19   Darlene Rodas MD   aspirin 81 MG tablet Take 1 tablet by mouth daily Last dose 4/26/15 5/3/19   Darlene Rodas MD   Lancets MISC Use to test blood sugar twice daily 5/3/19   Darlene Rodas MD   sildenafil (REVATIO) 20 MG tablet Take 1 tablet by mouth daily as needed (intercourse) Take 3 tablets an hour prior to intercourse 5/3/19   Darlene Rodas MD   meclizine (ANTIVERT) 25 MG tablet Take 1 tablet by mouth daily as needed for Dizziness 5/3/19   Darlene Rodas MD   terazosin (HYTRIN) 1 MG capsule Take 1 tab at night 5/3/19   Darlene Rodas MD   sildenafil (VIAGRA) 50 MG tablet Take 1 tablet by mouth daily as needed for Erectile Dysfunction 5/3/19   Darlene Rodas MD   blood glucose monitor strips Test sugar daily pre-breakfast. 5/3/19   Darlene Rodas MD   clopidogrel (PLAVIX) 75 MG tablet Take 1 tablet by mouth daily 3/13/19   Som Saunders MD   Blood Glucose Monitoring Suppl ARMANDO One glucose meter per insurance coverage, measure blood sugar twice daily morning and evening before eating meals.  Diag 250.00 2/20/15   Christopher Mccloud MD   oxyCODONE (Yi Pavy) 5 MG immediate release tablet Take 5 mg by mouth every evening. [facility]. Historical Provider, MD   oxycodone (OXYCONTIN) 40 MG CR tablet 80 mg in am; 60 mg in pm-ordered through [facility]. Historical Provider, MD       Future Appointments   Date Time Provider Daniela Lavonne   9/10/2019  1:20 PM KVNG Irizarry - CNP [facility]     ,   Diabetes Assessment    Medic Alert ID:  No  Meal Planning:  Avoidance of concentrated sweets   How often do you test your blood sugar?:  Daily   Do you have barriers with adherence to non-pharmacologic self-management interventions?  (Nutrition/Exercise/Self-Monitoring):  Yes   Have you ever had to go to the ED for symptoms of low blood sugar?:  No       Increase or Decrease trend in Blood Sugars   Do you have hyperglycemia symptoms?:  No   Do you have hypoglycemia symptoms?:  No   Last Blood Sugar Value:  158   Blood Sugar Monitoring Regimen:  Morning Fasting   Blood Sugar Trends:  Steady Increase       and   General Assessment    Do you have any symptoms that are causing concern?:  Yes  Progression since Onset:  Gradually Worsening  Reported Symptoms:  Pain (Comment: legs)

## 2019-06-04 ENCOUNTER — TELEPHONE (OUTPATIENT)
Dept: INTERNAL MEDICINE | Age: 69
End: 2019-06-04

## 2019-06-04 DIAGNOSIS — E11.69 DIABETES MELLITUS TYPE 2 IN OBESE (HCC): Primary | ICD-10-CM

## 2019-06-04 DIAGNOSIS — E66.9 DIABETES MELLITUS TYPE 2 IN OBESE (HCC): Primary | ICD-10-CM

## 2019-06-04 NOTE — TELEPHONE ENCOUNTER
Spoke to Mr Sanjuana Arredondo regarding his blood sugar levels. Currently 170s - 190s. He did not run out of meds but admitted that he had more to eat over the holiday weekend, after which sugars were noted to be elevated. Now back on low carb diet with better sugar control. Encourage medication, diet, and exercise compliance.     Electronically signed by Lexus Conklin MD on 6/4/2019 at 8:37 AM

## 2019-06-12 ENCOUNTER — TELEPHONE (OUTPATIENT)
Dept: INTERNAL MEDICINE | Age: 69
End: 2019-06-12

## 2019-06-12 NOTE — TELEPHONE ENCOUNTER
Pt called LSW stating concerns of his right baby toe being black; states had bumped it 2 months ago against register ; pt took picture and sent to [facility] which was shown to Dr. Rommel York with advice to make an appointment within the month; advised pt and attempted to make appt while pt on phone, but was disconnected.   Left message and gave July 12, 2019 at 10:00AM

## 2019-07-02 RX ORDER — CLOPIDOGREL BISULFATE 75 MG/1
TABLET ORAL
Qty: 30 TABLET | Refills: 3 | Status: SHIPPED | OUTPATIENT
Start: 2019-07-02 | End: 2019-09-10 | Stop reason: SDUPTHER

## 2019-07-08 ENCOUNTER — CARE COORDINATION (OUTPATIENT)
Dept: CARE COORDINATION | Age: 69
End: 2019-07-08

## 2019-07-08 NOTE — CARE COORDINATION
Ambulatory Care Coordination Note  7/8/2019  CM Risk Score: 2  Charlson 10 Year Mortality Risk Score: 47%     ACC: Triston Noriega RN    Summary Note:   Roxana Bennett called ACM for diabetes follow up. He states he is scheduled for a PCP visit 7/12/19 and asked if he needs blood work before his visit. ACM will check with PCP. He admits his blood sugars have been elevated averaging in the 170's, but he admits he has not been monitoring his diet and eating more ice cream. ACM reviewed the importance of limiting his carbohydrates and avoiding sweets. He verbalized understanding but states it is difficult for him. He declined diabetes management classes. Roxana Bennett states he has not driven since his stroke, and he would like to know when he will be released to drive again. ACM will check with PCP. PLAN  Send message to PCP about driving and labs. Follow up in 1 month and review blood sugars, diet, and assess care coordination needs. Care Coordination Interventions    Program Enrollment:  Rising Risk  Referral from Primary Care Provider:  Yes  Suggested Interventions and [address] [place] [address]:  Completed (Comment: sees [facility]- [facility]  [phone])  Diabetes Education:  Declined  Medication Assistance Program:  Completed (Comment: Not sure if get assistance with Extra Help but will call [employer] for clarification. states that  has not part D- mailed to patient information about Extra Help and part D  assistance contact information for computor/phone access)  Smoking Cessation: In Process (Comment: patient to consider Smoking Cessation program)  Zone Management Tools:  Completed (Comment: diabetes)         Goals Addressed                 This Visit's Progress     COMPLETED: Conditions and Symptoms        I will follow my Zone Management tool to seek urgent or emergent care. I will notify my provider of any symptoms that indicate a worsening of my condition.     Barriers: none  Plan for overcoming my barriers: read information mailed to patient  Confidence: /10  Anticipated Goal Completion Date: 6/1/19         COMPLETED: Patient Stated (pt-stated)        To loose 10lbs a month    Barriers: physical  Plan for overcoming my barriers: control calorie intake, increase activity  Confidence: 6/10  Anticipated Goal Completion Date: 6/1/19 5/31/19 patient has not been able to loose any weight. 7/8/19 patient was not successful because he continues to be non compliant with diabetic diet. Prior to Admission medications    Medication Sig Start Date End Date Taking?  Authorizing Provider   clopidogrel (PLAVIX) 75 MG tablet TAKE 1 TABLET BY MOUTH ONE TIME A DAY 7/2/19   Luis Maynard,    atorvastatin (LIPITOR) 40 MG tablet Take 1 tablet by mouth nightly 5/3/19   Jazmín Lucero MD   metFORMIN (GLUCOPHAGE) 500 MG tablet Take 1 tablet by mouth 2 times daily (with meals) 5/3/19   Jazmín Lucero MD   aspirin 81 MG tablet Take 1 tablet by mouth daily Last dose 4/26/15 5/3/19   Jazmín Lucero MD   Lancets MISC Use to test blood sugar twice daily 5/3/19   Jazmín Lucero MD   sildenafil (REVATIO) 20 MG tablet Take 1 tablet by mouth daily as needed (intercourse) Take 3 tablets an hour prior to intercourse 5/3/19   Jazmín Lucero MD   meclizine (ANTIVERT) 25 MG tablet Take 1 tablet by mouth daily as needed for Dizziness 5/3/19   Jazmín Lucero MD   terazosin (HYTRIN) 1 MG capsule Take 1 tab at night 5/3/19   Jazmín Lucero MD   sildenafil (VIAGRA) 50 MG tablet Take 1 tablet by mouth daily as needed for Erectile Dysfunction 5/3/19   Jazmín Lucero MD   blood glucose monitor strips Test sugar daily pre-breakfast. 5/3/19   Jazmín Lucero MD   clopidogrel (PLAVIX) 75 MG tablet Take 1 tablet by mouth daily 3/13/19   Johnna Chowdhury MD   Blood Glucose Monitoring Suppl ARMANDO One glucose meter per insurance coverage, measure

## 2019-08-22 ENCOUNTER — CARE COORDINATION (OUTPATIENT)
Dept: CARE COORDINATION | Age: 69
End: 2019-08-22

## 2019-08-22 ENCOUNTER — TELEPHONE (OUTPATIENT)
Dept: INTERNAL MEDICINE | Age: 69
End: 2019-08-22

## 2019-08-22 NOTE — CARE COORDINATION
Timoteo Colon MD   clopidogrel (PLAVIX) 75 MG tablet Take 1 tablet by mouth daily 3/13/19   Claudeen Mccoy, MD   Blood Glucose Monitoring Suppl ARMANDO One glucose meter per insurance coverage, measure blood sugar twice daily morning and evening before eating meals. Diag 250.00 2/20/15   Yesica Bynum MD   oxyCODONE (ROXICODONE) 5 MG immediate release tablet Take 5 mg by mouth every evening. [facility]. Historical Provider, MD   oxycodone (OXYCONTIN) 40 MG CR tablet 80 mg in am; 60 mg in pm-ordered through [facility]. Historical Provider, MD       Future Appointments   Date Time Provider Daniela Waltoni   9/10/2019  1:20 PM KVNG Lauren - CNP [facility]   9/16/2019 10:00 AM Devendra Stauffer MD [facility]     ,   Diabetes Assessment    Medic Alert ID:  No  Meal Planning:  Avoidance of concentrated sweets   How often do you test your blood sugar?:  Daily   Do you have barriers with adherence to non-pharmacologic self-management interventions?  (Nutrition/Exercise/Self-Monitoring):  Yes   Have you ever had to go to the ED for symptoms of low blood sugar?:  No       Increase or Decrease trend in Blood Sugars   Do you have hyperglycemia symptoms?:  No   Do you have hypoglycemia symptoms?:  No   Last Blood Sugar Value:  186   Blood Sugar Monitoring Regimen:  Morning Fasting   Blood Sugar Trends:  Fluctuating       and   General Assessment    Do you have any symptoms that are causing concern?:  Yes  Progression since Onset:  Unchanged  Reported Symptoms:  Pain

## 2019-08-26 DIAGNOSIS — E66.9 DIABETES MELLITUS TYPE 2 IN OBESE (HCC): Chronic | ICD-10-CM

## 2019-08-26 DIAGNOSIS — E11.69 DIABETES MELLITUS TYPE 2 IN OBESE (HCC): Chronic | ICD-10-CM

## 2019-08-27 RX ORDER — GLUCOSAMINE HCL/CHONDROITIN SU 500-400 MG
CAPSULE ORAL
Qty: 100 STRIP | Refills: 1 | Status: SHIPPED
Start: 2019-08-27 | End: 2020-05-27 | Stop reason: SDUPTHER

## 2019-08-29 ENCOUNTER — CARE COORDINATION (OUTPATIENT)
Dept: CARE COORDINATION | Age: 69
End: 2019-08-29

## 2019-09-10 ENCOUNTER — OFFICE VISIT (OUTPATIENT)
Dept: NEUROLOGY | Age: 69
End: 2019-09-10
Payer: MEDICARE

## 2019-09-10 VITALS
WEIGHT: 315 LBS | HEART RATE: 80 BPM | HEIGHT: 74 IN | RESPIRATION RATE: 12 BRPM | DIASTOLIC BLOOD PRESSURE: 78 MMHG | SYSTOLIC BLOOD PRESSURE: 128 MMHG | BODY MASS INDEX: 40.43 KG/M2 | TEMPERATURE: 98.3 F

## 2019-09-10 DIAGNOSIS — E66.01 MORBID OBESITY (HCC): Chronic | ICD-10-CM

## 2019-09-10 DIAGNOSIS — F80.81 STUTTERING: ICD-10-CM

## 2019-09-10 DIAGNOSIS — I63.9 ISCHEMIC STROKE OF FRONTAL LOBE (HCC): Primary | ICD-10-CM

## 2019-09-10 DIAGNOSIS — G47.33 OSA (OBSTRUCTIVE SLEEP APNEA): ICD-10-CM

## 2019-09-10 DIAGNOSIS — Z71.6 TOBACCO ABUSE COUNSELING: ICD-10-CM

## 2019-09-10 DIAGNOSIS — Z86.69 HX OF CENTRAL RETINAL ARTERY OCCLUSION: ICD-10-CM

## 2019-09-10 PROBLEM — G45.9 TIA (TRANSIENT ISCHEMIC ATTACK): Status: RESOLVED | Noted: 2018-11-13 | Resolved: 2019-09-10

## 2019-09-10 PROCEDURE — G8417 CALC BMI ABV UP PARAM F/U: HCPCS | Performed by: NURSE PRACTITIONER

## 2019-09-10 PROCEDURE — 99214 OFFICE O/P EST MOD 30 MIN: CPT | Performed by: NURSE PRACTITIONER

## 2019-09-10 PROCEDURE — 3017F COLORECTAL CA SCREEN DOC REV: CPT | Performed by: NURSE PRACTITIONER

## 2019-09-10 PROCEDURE — 1123F ACP DISCUSS/DSCN MKR DOCD: CPT | Performed by: NURSE PRACTITIONER

## 2019-09-10 PROCEDURE — G8598 ASA/ANTIPLAT THER USED: HCPCS | Performed by: NURSE PRACTITIONER

## 2019-09-10 PROCEDURE — 4040F PNEUMOC VAC/ADMIN/RCVD: CPT | Performed by: NURSE PRACTITIONER

## 2019-09-10 PROCEDURE — 4004F PT TOBACCO SCREEN RCVD TLK: CPT | Performed by: NURSE PRACTITIONER

## 2019-09-10 PROCEDURE — G8427 DOCREV CUR MEDS BY ELIG CLIN: HCPCS | Performed by: NURSE PRACTITIONER

## 2019-09-10 NOTE — PROGRESS NOTES
stuttering speech--more when he talks rapidly; less with slower speech and distraction  Language: no aphasias; verbose    Cranial Nerves:  I: smell NA   II: visual acuity  NA   II: visual fields Full    II: pupils ERRL   III,VII: ptosis None   III,IV,VI: extraocular muscles  Full ROM without nystagmus   V: mastication Normal   V: facial light touch sensation  Normal   V,VII: corneal reflex     VII: facial muscle function - upper  Normal   VII: facial muscle function - lower Normal   VIII: hearing Normal   IX: soft palate elevation  Normal   IX,X: gag reflex    XI: trapezius strength  5/5   XI: sternocleidomastoid strength 5/5   XI: neck extension strength  5/5   XII: tongue strength  Normal     Motor:  5/5 throughout  Obese bulk; spastic legs  No drift  No abnormal movements    Sensory:  LT  normal     Coordination:   FN, FFM intact b/l    Gait:  Elena's  Lumbering, stiff, and antalgic     DTR:   BE throughout    No Riddle's    Laboratory/Radiology:     HgBA1c:    Lab Results   Component Value Date    LABA1C 6.9 04/30/2019       * Images and labs personally reviewed at the time of this office visit    Assessment:     The patient has a hx of L frontal lobe cortical stroke   Mechanism suspicious for embolic disease--s/p PFO closure though I doubt this was source of stroke   I find no residual deficits today and he remains on DAPT and statin   Recommend extended cardiac monitoring for any other embolic-appearing strokes in future   Needs aggressive risk factor mod including smoking cessation,wt loss, and continued DM control    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

## 2019-09-13 ENCOUNTER — CARE COORDINATION (OUTPATIENT)
Dept: CARE COORDINATION | Age: 69
End: 2019-09-13

## 2019-09-16 ENCOUNTER — CARE COORDINATION (OUTPATIENT)
Dept: CARE COORDINATION | Age: 69
End: 2019-09-16

## 2019-09-16 ENCOUNTER — TELEPHONE (OUTPATIENT)
Dept: INTERNAL MEDICINE | Age: 69
End: 2019-09-16

## 2019-09-16 ENCOUNTER — OFFICE VISIT (OUTPATIENT)
Dept: INTERNAL MEDICINE | Age: 69
End: 2019-09-16
Payer: MEDICARE

## 2019-09-16 VITALS
HEART RATE: 97 BPM | TEMPERATURE: 98.1 F | HEIGHT: 74 IN | DIASTOLIC BLOOD PRESSURE: 87 MMHG | SYSTOLIC BLOOD PRESSURE: 131 MMHG | WEIGHT: 315 LBS | BODY MASS INDEX: 40.43 KG/M2 | OXYGEN SATURATION: 94 %

## 2019-09-16 DIAGNOSIS — E11.69 DIABETES MELLITUS TYPE 2 IN OBESE (HCC): Primary | ICD-10-CM

## 2019-09-16 DIAGNOSIS — E78.5 HYPERLIPIDEMIA, UNSPECIFIED HYPERLIPIDEMIA TYPE: ICD-10-CM

## 2019-09-16 DIAGNOSIS — Z87.74 S/P PATENT FORAMEN OVALE CLOSURE: ICD-10-CM

## 2019-09-16 DIAGNOSIS — G47.33 OSA (OBSTRUCTIVE SLEEP APNEA): ICD-10-CM

## 2019-09-16 DIAGNOSIS — E66.9 DIABETES MELLITUS TYPE 2 IN OBESE (HCC): Primary | ICD-10-CM

## 2019-09-16 LAB — HBA1C MFR BLD: 7.1 %

## 2019-09-16 PROCEDURE — 99213 OFFICE O/P EST LOW 20 MIN: CPT | Performed by: INTERNAL MEDICINE

## 2019-09-16 PROCEDURE — G8417 CALC BMI ABV UP PARAM F/U: HCPCS | Performed by: INTERNAL MEDICINE

## 2019-09-16 PROCEDURE — G8427 DOCREV CUR MEDS BY ELIG CLIN: HCPCS | Performed by: INTERNAL MEDICINE

## 2019-09-16 PROCEDURE — 1123F ACP DISCUSS/DSCN MKR DOCD: CPT | Performed by: INTERNAL MEDICINE

## 2019-09-16 PROCEDURE — 3045F PR MOST RECENT HEMOGLOBIN A1C LEVEL 7.0-9.0%: CPT | Performed by: INTERNAL MEDICINE

## 2019-09-16 PROCEDURE — G8598 ASA/ANTIPLAT THER USED: HCPCS | Performed by: INTERNAL MEDICINE

## 2019-09-16 PROCEDURE — 2022F DILAT RTA XM EVC RTNOPTHY: CPT | Performed by: INTERNAL MEDICINE

## 2019-09-16 PROCEDURE — 4040F PNEUMOC VAC/ADMIN/RCVD: CPT | Performed by: INTERNAL MEDICINE

## 2019-09-16 PROCEDURE — 4004F PT TOBACCO SCREEN RCVD TLK: CPT | Performed by: INTERNAL MEDICINE

## 2019-09-16 PROCEDURE — 83036 HEMOGLOBIN GLYCOSYLATED A1C: CPT | Performed by: INTERNAL MEDICINE

## 2019-09-16 PROCEDURE — 3017F COLORECTAL CA SCREEN DOC REV: CPT | Performed by: INTERNAL MEDICINE

## 2019-09-16 RX ORDER — ATORVASTATIN CALCIUM 40 MG/1
40 TABLET, FILM COATED ORAL NIGHTLY
Qty: 90 TABLET | Refills: 1 | Status: SHIPPED
Start: 2019-09-16 | End: 2020-02-06 | Stop reason: SDUPTHER

## 2019-09-16 RX ORDER — BUPROPION HYDROCHLORIDE 150 MG/1
150 TABLET, EXTENDED RELEASE ORAL DAILY
Status: ON HOLD | COMMUNITY
End: 2020-05-07

## 2019-09-16 RX ORDER — CLOPIDOGREL BISULFATE 75 MG/1
75 TABLET ORAL DAILY
Qty: 30 TABLET | Refills: 11 | Status: CANCELLED | OUTPATIENT
Start: 2019-09-16

## 2019-09-16 ASSESSMENT — ENCOUNTER SYMPTOMS
COUGH: 0
NAUSEA: 0
CONSTIPATION: 0
ABDOMINAL PAIN: 0
SHORTNESS OF BREATH: 0
DIARRHEA: 0
SORE THROAT: 0
VOMITING: 0
BLOOD IN STOOL: 0
EYE DISCHARGE: 0

## 2019-09-16 NOTE — CARE COORDINATION
Ambulatory Care Coordination Note  9/16/2019  CM Risk Score: 2  Charlson 10 Year Mortality Risk Score: 47%     ACC: Wily Urbina, RN    Summary Note:   ACM met with Cristin Jones at his PCP visit for diabetes follow up. He reports his blood sugars have been 140-160. He states he is avoiding sweets and taking his medications as prescribed. ACM discussed his scheduled dental surgery on 9/18/19 and he continues to not take his aspirin and Plavix in preparation. Jonh Haste also present and answered Ángel's questions about his medical bills. ACM reviewed requesting transportation while scheduling office visits. He verbalized understanding. PLAN  Notify PCP that Cristin Jones is ready to graduate from Care Coordination. Will place in 90 day exclusion. Care Coordination Interventions    Program Enrollment:  Rising Risk  Referral from Primary Care Provider:  Yes  Suggested Interventions and [address]:  Completed (Comment: sees [facility]- [facility]- [facility]  [phone])  Diabetes Education:  Declined  Medication Assistance Program:  Completed (Comment: Not sure if get assistance with Extra Help but will call [employer] for clarification. states that  has not part D- mailed to patient information about Extra Help and part D  assistance contact information for computor/phone access)  Smoking Cessation: In Process (Comment: patient to consider Smoking Cessation program)  Zone Management Tools:  Completed (Comment: diabetes)         Goals Addressed                 This Visit's Progress     COMPLETED: Conditions and Symptoms   On track     I will schedule office visits, as directed by my provider. I will keep my appointment or reschedule if I have to cancel. I will notify my provider of any barriers to my plan of care. I will follow my Zone Management tool to seek urgent or emergent care.   I will notify my provider of any symptoms that indicate a worsening of my through [facility]. Historical Provider, MD       No future appointments. ,   Diabetes Assessment    Medic Alert ID:  No  Meal Planning:  Avoidance of concentrated sweets   How often do you test your blood sugar?:  Daily   Do you have barriers with adherence to non-pharmacologic self-management interventions?  (Nutrition/Exercise/Self-Monitoring):  Yes   Have you ever had to go to the ED for symptoms of low blood sugar?:  No       Do you have hyperglycemia symptoms?:  No   Do you have hypoglycemia symptoms?:  No   Last Blood Sugar Value:  162   Blood Sugar Monitoring Regimen:  Morning Fasting   Blood Sugar Trends:  Fluctuating       and   General Assessment    Do you have any symptoms that are causing concern?:  No

## 2019-09-17 ENCOUNTER — TELEPHONE (OUTPATIENT)
Dept: NEUROLOGY | Age: 69
End: 2019-09-17

## 2019-10-01 ENCOUNTER — OFFICE VISIT (OUTPATIENT)
Dept: CARDIOLOGY CLINIC | Age: 69
End: 2019-10-01
Payer: MEDICARE

## 2019-10-01 VITALS
HEIGHT: 72 IN | RESPIRATION RATE: 16 BRPM | WEIGHT: 315 LBS | SYSTOLIC BLOOD PRESSURE: 136 MMHG | DIASTOLIC BLOOD PRESSURE: 72 MMHG | HEART RATE: 84 BPM | BODY MASS INDEX: 42.66 KG/M2

## 2019-10-01 DIAGNOSIS — Q21.12 PFO (PATENT FORAMEN OVALE): ICD-10-CM

## 2019-10-01 DIAGNOSIS — I10 ESSENTIAL HYPERTENSION: Primary | ICD-10-CM

## 2019-10-01 DIAGNOSIS — I07.1 TRICUSPID VALVE INSUFFICIENCY, UNSPECIFIED ETIOLOGY: ICD-10-CM

## 2019-10-01 PROCEDURE — 93000 ELECTROCARDIOGRAM COMPLETE: CPT | Performed by: INTERNAL MEDICINE

## 2019-10-01 PROCEDURE — G8427 DOCREV CUR MEDS BY ELIG CLIN: HCPCS | Performed by: NURSE PRACTITIONER

## 2019-10-01 PROCEDURE — G8484 FLU IMMUNIZE NO ADMIN: HCPCS | Performed by: NURSE PRACTITIONER

## 2019-10-01 PROCEDURE — 99213 OFFICE O/P EST LOW 20 MIN: CPT | Performed by: NURSE PRACTITIONER

## 2019-10-01 PROCEDURE — 3017F COLORECTAL CA SCREEN DOC REV: CPT | Performed by: NURSE PRACTITIONER

## 2019-10-01 PROCEDURE — 1123F ACP DISCUSS/DSCN MKR DOCD: CPT | Performed by: NURSE PRACTITIONER

## 2019-10-01 PROCEDURE — G8417 CALC BMI ABV UP PARAM F/U: HCPCS | Performed by: NURSE PRACTITIONER

## 2019-10-01 PROCEDURE — 4004F PT TOBACCO SCREEN RCVD TLK: CPT | Performed by: NURSE PRACTITIONER

## 2019-10-01 PROCEDURE — G8598 ASA/ANTIPLAT THER USED: HCPCS | Performed by: NURSE PRACTITIONER

## 2019-10-01 PROCEDURE — 4040F PNEUMOC VAC/ADMIN/RCVD: CPT | Performed by: NURSE PRACTITIONER

## 2019-10-01 RX ORDER — LISINOPRIL 5 MG/1
5 TABLET ORAL DAILY
Qty: 30 TABLET | Refills: 3 | Status: SHIPPED
Start: 2019-10-01 | End: 2020-02-06 | Stop reason: SDUPTHER

## 2019-10-04 ENCOUNTER — TELEPHONE (OUTPATIENT)
Dept: ADMINISTRATIVE | Age: 69
End: 2019-10-04

## 2019-10-17 ENCOUNTER — TELEPHONE (OUTPATIENT)
Dept: INTERNAL MEDICINE | Age: 69
End: 2019-10-17

## 2019-10-17 ENCOUNTER — TELEPHONE (OUTPATIENT)
Dept: ADMINISTRATIVE | Age: 69
End: 2019-10-17

## 2019-10-24 ENCOUNTER — TELEPHONE (OUTPATIENT)
Dept: NEUROLOGY | Age: 69
End: 2019-10-24

## 2019-12-30 ENCOUNTER — INITIAL CONSULT (OUTPATIENT)
Dept: NEUROSURGERY | Age: 69
End: 2019-12-30
Payer: COMMERCIAL

## 2019-12-30 VITALS
DIASTOLIC BLOOD PRESSURE: 73 MMHG | HEIGHT: 72 IN | WEIGHT: 315 LBS | HEART RATE: 81 BPM | BODY MASS INDEX: 42.66 KG/M2 | SYSTOLIC BLOOD PRESSURE: 137 MMHG

## 2019-12-30 DIAGNOSIS — M54.16 LUMBAR RADICULOPATHY: Primary | ICD-10-CM

## 2019-12-30 PROCEDURE — 99213 OFFICE O/P EST LOW 20 MIN: CPT | Performed by: PHYSICIAN ASSISTANT

## 2020-01-06 ENCOUNTER — HOSPITAL ENCOUNTER (OUTPATIENT)
Dept: MRI IMAGING | Age: 70
Discharge: HOME | End: 2020-01-08
Payer: MEDICARE

## 2020-01-06 PROCEDURE — 72148 MRI LUMBAR SPINE W/O DYE: CPT

## 2020-01-20 ENCOUNTER — TELEPHONE (OUTPATIENT)
Dept: NEUROSURGERY | Age: 70
End: 2020-01-20

## 2020-01-20 NOTE — TELEPHONE ENCOUNTER
Patient called--MRI reviewed. Multilevel foraminal stenosis, degenerative changes. Bring in to discuss options with Dr. Sherrie Sheets.

## 2020-02-05 ASSESSMENT — ENCOUNTER SYMPTOMS
DIARRHEA: 0
CONSTIPATION: 0
SHORTNESS OF BREATH: 0
NAUSEA: 0
COUGH: 0
BLOOD IN STOOL: 0
VOMITING: 0
SORE THROAT: 0
EYE DISCHARGE: 0
ABDOMINAL PAIN: 0

## 2020-02-06 ENCOUNTER — OFFICE VISIT (OUTPATIENT)
Dept: INTERNAL MEDICINE | Age: 70
End: 2020-02-06
Payer: MEDICARE

## 2020-02-06 VITALS
RESPIRATION RATE: 16 BRPM | HEIGHT: 73 IN | SYSTOLIC BLOOD PRESSURE: 127 MMHG | WEIGHT: 315 LBS | HEART RATE: 81 BPM | OXYGEN SATURATION: 96 % | TEMPERATURE: 98.1 F | DIASTOLIC BLOOD PRESSURE: 81 MMHG | BODY MASS INDEX: 41.75 KG/M2

## 2020-02-06 LAB — HBA1C MFR BLD: 6.7 %

## 2020-02-06 PROCEDURE — 1123F ACP DISCUSS/DSCN MKR DOCD: CPT | Performed by: INTERNAL MEDICINE

## 2020-02-06 PROCEDURE — 4040F PNEUMOC VAC/ADMIN/RCVD: CPT | Performed by: INTERNAL MEDICINE

## 2020-02-06 PROCEDURE — 99214 OFFICE O/P EST MOD 30 MIN: CPT | Performed by: INTERNAL MEDICINE

## 2020-02-06 PROCEDURE — 3017F COLORECTAL CA SCREEN DOC REV: CPT | Performed by: INTERNAL MEDICINE

## 2020-02-06 PROCEDURE — G0008 ADMIN INFLUENZA VIRUS VAC: HCPCS

## 2020-02-06 PROCEDURE — G8417 CALC BMI ABV UP PARAM F/U: HCPCS | Performed by: INTERNAL MEDICINE

## 2020-02-06 PROCEDURE — 99212 OFFICE O/P EST SF 10 MIN: CPT | Performed by: INTERNAL MEDICINE

## 2020-02-06 PROCEDURE — 4004F PT TOBACCO SCREEN RCVD TLK: CPT | Performed by: INTERNAL MEDICINE

## 2020-02-06 PROCEDURE — 2022F DILAT RTA XM EVC RTNOPTHY: CPT | Performed by: INTERNAL MEDICINE

## 2020-02-06 PROCEDURE — 83036 HEMOGLOBIN GLYCOSYLATED A1C: CPT | Performed by: INTERNAL MEDICINE

## 2020-02-06 PROCEDURE — 3044F HG A1C LEVEL LT 7.0%: CPT | Performed by: INTERNAL MEDICINE

## 2020-02-06 PROCEDURE — G8427 DOCREV CUR MEDS BY ELIG CLIN: HCPCS | Performed by: INTERNAL MEDICINE

## 2020-02-06 PROCEDURE — 6360000002 HC RX W HCPCS

## 2020-02-06 PROCEDURE — G8482 FLU IMMUNIZE ORDER/ADMIN: HCPCS | Performed by: INTERNAL MEDICINE

## 2020-02-06 PROCEDURE — 90686 IIV4 VACC NO PRSV 0.5 ML IM: CPT

## 2020-02-06 RX ORDER — SILDENAFIL CITRATE 20 MG/1
20 TABLET ORAL DAILY PRN
Qty: 10 TABLET | Status: CANCELLED | OUTPATIENT
Start: 2020-02-06

## 2020-02-06 RX ORDER — TERAZOSIN 1 MG/1
CAPSULE ORAL
Qty: 30 CAPSULE | Status: CANCELLED | OUTPATIENT
Start: 2020-02-06

## 2020-02-06 RX ORDER — LISINOPRIL 5 MG/1
5 TABLET ORAL DAILY
Qty: 30 TABLET | Refills: 3 | Status: SHIPPED
Start: 2020-02-06 | End: 2020-05-27 | Stop reason: SDUPTHER

## 2020-02-06 RX ORDER — ATORVASTATIN CALCIUM 40 MG/1
40 TABLET, FILM COATED ORAL NIGHTLY
Qty: 90 TABLET | Refills: 3 | Status: SHIPPED
Start: 2020-02-06 | End: 2020-11-03 | Stop reason: SDUPTHER

## 2020-02-06 SDOH — ECONOMIC STABILITY: FOOD INSECURITY: WITHIN THE PAST 12 MONTHS, THE FOOD YOU BOUGHT JUST DIDN'T LAST AND YOU DIDN'T HAVE MONEY TO GET MORE.: SOMETIMES TRUE

## 2020-02-06 SDOH — ECONOMIC STABILITY: FOOD INSECURITY: WITHIN THE PAST 12 MONTHS, YOU WORRIED THAT YOUR FOOD WOULD RUN OUT BEFORE YOU GOT MONEY TO BUY MORE.: OFTEN TRUE

## 2020-02-06 SDOH — ECONOMIC STABILITY: INCOME INSECURITY: HOW HARD IS IT FOR YOU TO PAY FOR THE VERY BASICS LIKE FOOD, HOUSING, MEDICAL CARE, AND HEATING?: HARD

## 2020-02-06 ASSESSMENT — PATIENT HEALTH QUESTIONNAIRE - PHQ9
2. FEELING DOWN, DEPRESSED OR HOPELESS: 0
SUM OF ALL RESPONSES TO PHQ QUESTIONS 1-9: 0
SUM OF ALL RESPONSES TO PHQ QUESTIONS 1-9: 0
1. LITTLE INTEREST OR PLEASURE IN DOING THINGS: 0
SUM OF ALL RESPONSES TO PHQ9 QUESTIONS 1 & 2: 0

## 2020-02-06 NOTE — PROGRESS NOTES
Attending Physician Statement  I have discussed the case, including pertinent history and exam findings with the resident. I have seen and examined the patient and the key elements of the encounter have been performed by me. I agree with the assessment, plan and orders as documented by the resident. Pt presented for the follow up DM, SP crytogenic CVA, on ASA and statin. Last A1C was 7.1. Has complete dental extraction and noted the weight loss since extraction and on lose dose lisinopril reported. LATISHA and pt still has not have an appointment. Smoker and may require nicorette gums. Lost 9 Lbs since December 2019.   Kyle Macedo
pack lasting for 5 days  - nicorette gum    HCM  -One time AAA screening ultrasound for smokers >5 pack's lifetime No  -ETOH misuse No  -Colon cancer screening, done 7/2015  -recommended exercise for falls prevention, yes but patient not compliant with PT  -one-time screening for HCV infection to adults born between 1945 and 1965: non-reactive 5/2017  -Annual lung cancer 55 to 80 years who have a 30 pack-year smoking history and currently smoke or have quit within the past 15 years - none, 7 py smoking hx    Flu shot today    RTC: 3 months    I have reviewed my findings andrecommendations with Keith Suarez and Dr Karen Yu MD PGY-3  2/5/2020 10:46 AM

## 2020-02-06 NOTE — PATIENT INSTRUCTIONS
that will allow you to sit while showering. · Get into a tub or shower by putting the weaker leg in first. Get out of a tub or shower with your strong side first.  · Repair loose toilet seats and consider installing a raised toilet seat to make getting on and off the toilet easier. · Keep your bathroom door unlocked while you are in the shower. Where can you learn more? Go to https://[employer]". org and sign in to your [employer] account. Enter [phone] in the [employer] box to learn more about \"Preventing Falls: Care Instructions. \"     If you do not have an account, please click on the \"Sign Up Now\" link. Current as of: August 6, 2019  Content Version: 12.3  © 2061-5050 Healthwise, Incorporated. Care instructions adapted under license by [facility] ([facility]). If you have questions about a medical condition or this instruction, always ask your healthcare professional. [place] [address] any warranty or liability for your use of this information. Dear Prosper Moralez,        Thank you for coming to your appointment today. I hope we have addressed all of your needs. Please make sure to do the following:  - Continue your medications as listed. - Get labs drawn before our next follow up. - We will see each other again in 3 months    Have a great day! Sincerely,  Greg Burk M.D  2/6/2020  3:04 PM

## 2020-04-08 ENCOUNTER — TELEPHONE (OUTPATIENT)
Dept: INTERNAL MEDICINE | Age: 70
End: 2020-04-08

## 2020-04-08 RX ORDER — MECLIZINE HYDROCHLORIDE 25 MG/1
25 TABLET ORAL 2 TIMES DAILY PRN
Qty: 60 TABLET | Refills: 2 | Status: SHIPPED
Start: 2020-04-08 | End: 2020-10-16 | Stop reason: SDUPTHER

## 2020-04-26 ENCOUNTER — NURSE TRIAGE (OUTPATIENT)
Dept: OTHER | Facility: CLINIC | Age: 70
End: 2020-04-26

## 2020-05-07 ENCOUNTER — APPOINTMENT (OUTPATIENT)
Dept: MRI IMAGING | Age: 70
End: 2020-05-07
Payer: MEDICARE

## 2020-05-07 ENCOUNTER — HOSPITAL ENCOUNTER (OUTPATIENT)
Age: 70
Setting detail: OBSERVATION
Discharge: HOME | End: 2020-05-08
Attending: EMERGENCY MEDICINE | Admitting: INTERNAL MEDICINE
Payer: MEDICARE

## 2020-05-07 ENCOUNTER — APPOINTMENT (OUTPATIENT)
Dept: CT IMAGING | Age: 70
End: 2020-05-07
Payer: MEDICARE

## 2020-05-07 ENCOUNTER — APPOINTMENT (OUTPATIENT)
Dept: GENERAL RADIOLOGY | Age: 70
End: 2020-05-07
Payer: MEDICARE

## 2020-05-07 PROBLEM — G45.9 TIA (TRANSIENT ISCHEMIC ATTACK): Status: ACTIVE | Noted: 2020-05-07

## 2020-05-07 LAB
ALBUMIN SERPL-MCNC: 3.6 G/DL (ref 3.5–5.2)
ALP BLD-CCNC: 78 U/L (ref 40–129)
ALT SERPL-CCNC: 12 U/L (ref 0–40)
ANION GAP SERPL CALCULATED.3IONS-SCNC: 14 MMOL/L (ref 7–16)
AST SERPL-CCNC: 14 U/L (ref 0–39)
BASOPHILS ABSOLUTE: 0.05 E9/L (ref 0–0.2)
BASOPHILS RELATIVE PERCENT: 0.6 % (ref 0–2)
BILIRUB SERPL-MCNC: 0.3 MG/DL (ref 0–1.2)
BUN BLDV-MCNC: 12 MG/DL (ref 8–23)
CALCIUM SERPL-MCNC: 8.7 MG/DL (ref 8.6–10.2)
CHLORIDE BLD-SCNC: 99 MMOL/L (ref 98–107)
CHOLESTEROL, TOTAL: 107 MG/DL (ref 0–199)
CO2: 24 MMOL/L (ref 22–29)
CREAT SERPL-MCNC: 0.8 MG/DL (ref 0.7–1.2)
EKG ATRIAL RATE: 68 BPM
EKG ATRIAL RATE: 69 BPM
EKG P AXIS: 66 DEGREES
EKG P AXIS: 73 DEGREES
EKG P-R INTERVAL: 170 MS
EKG P-R INTERVAL: 182 MS
EKG Q-T INTERVAL: 426 MS
EKG Q-T INTERVAL: 438 MS
EKG QRS DURATION: 106 MS
EKG QRS DURATION: 106 MS
EKG QTC CALCULATION (BAZETT): 456 MS
EKG QTC CALCULATION (BAZETT): 465 MS
EKG R AXIS: -37 DEGREES
EKG R AXIS: -44 DEGREES
EKG T AXIS: 19 DEGREES
EKG T AXIS: 42 DEGREES
EKG VENTRICULAR RATE: 68 BPM
EKG VENTRICULAR RATE: 69 BPM
EOSINOPHILS ABSOLUTE: 0.17 E9/L (ref 0.05–0.5)
EOSINOPHILS RELATIVE PERCENT: 2.1 % (ref 0–6)
FERRITIN: 349 NG/ML
FOLATE: 9.2 NG/ML (ref 4.8–24.2)
GFR AFRICAN AMERICAN: >60
GFR NON-AFRICAN AMERICAN: >60 ML/MIN/1.73
GLUCOSE BLD-MCNC: 181 MG/DL (ref 74–99)
HCT VFR BLD CALC: 36.9 % (ref 37–54)
HDLC SERPL-MCNC: 44 MG/DL
HEMOGLOBIN: 11.6 G/DL (ref 12.5–16.5)
IMMATURE GRANULOCYTES #: 0.03 E9/L
IMMATURE GRANULOCYTES %: 0.4 % (ref 0–5)
INR BLD: 1.1
IRON SATURATION: 23 % (ref 20–55)
IRON: 46 MCG/DL (ref 59–158)
LDL CHOLESTEROL CALCULATED: 43 MG/DL (ref 0–99)
LYMPHOCYTES ABSOLUTE: 3.06 E9/L (ref 1.5–4)
LYMPHOCYTES RELATIVE PERCENT: 37.5 % (ref 20–42)
MCH RBC QN AUTO: 28.7 PG (ref 26–35)
MCHC RBC AUTO-ENTMCNC: 31.4 % (ref 32–34.5)
MCV RBC AUTO: 91.3 FL (ref 80–99.9)
METER GLUCOSE: 127 MG/DL (ref 74–99)
METER GLUCOSE: 133 MG/DL (ref 74–99)
METER GLUCOSE: 148 MG/DL (ref 74–99)
METER GLUCOSE: 160 MG/DL (ref 74–99)
METER GLUCOSE: 163 MG/DL (ref 74–99)
METER GLUCOSE: 170 MG/DL (ref 74–99)
MONOCYTES ABSOLUTE: 0.77 E9/L (ref 0.1–0.95)
MONOCYTES RELATIVE PERCENT: 9.4 % (ref 2–12)
NEUTROPHILS ABSOLUTE: 4.08 E9/L (ref 1.8–7.3)
NEUTROPHILS RELATIVE PERCENT: 50 % (ref 43–80)
PDW BLD-RTO: 12.2 FL (ref 11.5–15)
PLATELET # BLD: 248 E9/L (ref 130–450)
PMV BLD AUTO: 8.8 FL (ref 7–12)
POTASSIUM REFLEX MAGNESIUM: 3.9 MMOL/L (ref 3.5–5)
PROTHROMBIN TIME: 12.6 SEC (ref 9.3–12.4)
RBC # BLD: 4.04 E12/L (ref 3.8–5.8)
SODIUM BLD-SCNC: 137 MMOL/L (ref 132–146)
TOTAL IRON BINDING CAPACITY: 196 MCG/DL (ref 250–450)
TOTAL PROTEIN: 7.1 G/DL (ref 6.4–8.3)
TRIGL SERPL-MCNC: 100 MG/DL (ref 0–149)
TROPONIN: <0.01 NG/ML (ref 0–0.03)
TROPONIN: <0.01 NG/ML (ref 0–0.03)
VITAMIN B-12: 469 PG/ML (ref 211–946)
VLDLC SERPL CALC-MCNC: 20 MG/DL
WBC # BLD: 8.2 E9/L (ref 4.5–11.5)

## 2020-05-07 PROCEDURE — 82746 ASSAY OF FOLIC ACID SERUM: CPT

## 2020-05-07 PROCEDURE — 6370000000 HC RX 637 (ALT 250 FOR IP): Performed by: EMERGENCY MEDICINE

## 2020-05-07 PROCEDURE — 2580000003 HC RX 258: Performed by: INTERNAL MEDICINE

## 2020-05-07 PROCEDURE — G0378 HOSPITAL OBSERVATION PER HR: HCPCS

## 2020-05-07 PROCEDURE — 96372 THER/PROPH/DIAG INJ SC/IM: CPT

## 2020-05-07 PROCEDURE — 84484 ASSAY OF TROPONIN QUANT: CPT

## 2020-05-07 PROCEDURE — 93005 ELECTROCARDIOGRAM TRACING: CPT | Performed by: EMERGENCY MEDICINE

## 2020-05-07 PROCEDURE — 99222 1ST HOSP IP/OBS MODERATE 55: CPT | Performed by: INTERNAL MEDICINE

## 2020-05-07 PROCEDURE — 99284 EMERGENCY DEPT VISIT MOD MDM: CPT

## 2020-05-07 PROCEDURE — 83540 ASSAY OF IRON: CPT

## 2020-05-07 PROCEDURE — 70498 CT ANGIOGRAPHY NECK: CPT

## 2020-05-07 PROCEDURE — 70496 CT ANGIOGRAPHY HEAD: CPT

## 2020-05-07 PROCEDURE — 6370000000 HC RX 637 (ALT 250 FOR IP): Performed by: INTERNAL MEDICINE

## 2020-05-07 PROCEDURE — 97161 PT EVAL LOW COMPLEX 20 MIN: CPT | Performed by: PHYSICAL THERAPIST

## 2020-05-07 PROCEDURE — 82607 VITAMIN B-12: CPT

## 2020-05-07 PROCEDURE — 70450 CT HEAD/BRAIN W/O DYE: CPT

## 2020-05-07 PROCEDURE — 6360000004 HC RX CONTRAST MEDICATION: Performed by: RADIOLOGY

## 2020-05-07 PROCEDURE — 6370000000 HC RX 637 (ALT 250 FOR IP): Performed by: PSYCHIATRY & NEUROLOGY

## 2020-05-07 PROCEDURE — 71045 X-RAY EXAM CHEST 1 VIEW: CPT

## 2020-05-07 PROCEDURE — 85025 COMPLETE CBC W/AUTO DIFF WBC: CPT

## 2020-05-07 PROCEDURE — 85610 PROTHROMBIN TIME: CPT

## 2020-05-07 PROCEDURE — 83550 IRON BINDING TEST: CPT

## 2020-05-07 PROCEDURE — 93005 ELECTROCARDIOGRAM TRACING: CPT | Performed by: INTERNAL MEDICINE

## 2020-05-07 PROCEDURE — 70551 MRI BRAIN STEM W/O DYE: CPT

## 2020-05-07 PROCEDURE — 80053 COMPREHEN METABOLIC PANEL: CPT

## 2020-05-07 PROCEDURE — 82962 GLUCOSE BLOOD TEST: CPT

## 2020-05-07 PROCEDURE — 93010 ELECTROCARDIOGRAM REPORT: CPT | Performed by: INTERNAL MEDICINE

## 2020-05-07 PROCEDURE — 6360000002 HC RX W HCPCS: Performed by: INTERNAL MEDICINE

## 2020-05-07 PROCEDURE — 99218 PR INITIAL OBSERVATION CARE/DAY 30 MINUTES: CPT | Performed by: PSYCHIATRY & NEUROLOGY

## 2020-05-07 PROCEDURE — 36415 COLL VENOUS BLD VENIPUNCTURE: CPT

## 2020-05-07 PROCEDURE — 80061 LIPID PANEL: CPT

## 2020-05-07 PROCEDURE — 82728 ASSAY OF FERRITIN: CPT

## 2020-05-07 RX ORDER — ONDANSETRON 2 MG/ML
4 INJECTION INTRAMUSCULAR; INTRAVENOUS EVERY 6 HOURS PRN
Status: DISCONTINUED | OUTPATIENT
Start: 2020-05-07 | End: 2020-05-08 | Stop reason: HOSPADM

## 2020-05-07 RX ORDER — ONDANSETRON 2 MG/ML
4 INJECTION INTRAMUSCULAR; INTRAVENOUS EVERY 6 HOURS PRN
Status: DISCONTINUED | OUTPATIENT
Start: 2020-05-07 | End: 2020-05-07 | Stop reason: SDUPTHER

## 2020-05-07 RX ORDER — OXYCODONE HCL 20 MG/1
60 TABLET, FILM COATED, EXTENDED RELEASE ORAL NIGHTLY
Status: DISCONTINUED | OUTPATIENT
Start: 2020-05-07 | End: 2020-05-08 | Stop reason: HOSPADM

## 2020-05-07 RX ORDER — CLOPIDOGREL BISULFATE 75 MG/1
75 TABLET ORAL DAILY
Status: DISCONTINUED | OUTPATIENT
Start: 2020-05-07 | End: 2020-05-08 | Stop reason: HOSPADM

## 2020-05-07 RX ORDER — OXYCODONE HYDROCHLORIDE 80 MG/1
80 TABLET, FILM COATED, EXTENDED RELEASE ORAL EVERY MORNING
Status: DISCONTINUED | OUTPATIENT
Start: 2020-05-08 | End: 2020-05-08 | Stop reason: HOSPADM

## 2020-05-07 RX ORDER — POLYETHYLENE GLYCOL 3350 17 G/17G
17 POWDER, FOR SOLUTION ORAL DAILY PRN
Status: DISCONTINUED | OUTPATIENT
Start: 2020-05-07 | End: 2020-05-08 | Stop reason: HOSPADM

## 2020-05-07 RX ORDER — SODIUM CHLORIDE 0.9 % (FLUSH) 0.9 %
10 SYRINGE (ML) INJECTION EVERY 12 HOURS SCHEDULED
Status: DISCONTINUED | OUTPATIENT
Start: 2020-05-07 | End: 2020-05-08 | Stop reason: HOSPADM

## 2020-05-07 RX ORDER — DEXTROSE MONOHYDRATE 50 MG/ML
100 INJECTION, SOLUTION INTRAVENOUS PRN
Status: DISCONTINUED | OUTPATIENT
Start: 2020-05-07 | End: 2020-05-08 | Stop reason: HOSPADM

## 2020-05-07 RX ORDER — PROMETHAZINE HYDROCHLORIDE 25 MG/1
12.5 TABLET ORAL EVERY 6 HOURS PRN
Status: DISCONTINUED | OUTPATIENT
Start: 2020-05-07 | End: 2020-05-07 | Stop reason: SDUPTHER

## 2020-05-07 RX ORDER — PROMETHAZINE HYDROCHLORIDE 25 MG/1
12.5 TABLET ORAL EVERY 6 HOURS PRN
Status: DISCONTINUED | OUTPATIENT
Start: 2020-05-07 | End: 2020-05-08 | Stop reason: HOSPADM

## 2020-05-07 RX ORDER — ATORVASTATIN CALCIUM 40 MG/1
40 TABLET, FILM COATED ORAL NIGHTLY
Status: DISCONTINUED | OUTPATIENT
Start: 2020-05-07 | End: 2020-05-08 | Stop reason: HOSPADM

## 2020-05-07 RX ORDER — ASPIRIN 81 MG/1
324 TABLET, CHEWABLE ORAL ONCE
Status: COMPLETED | OUTPATIENT
Start: 2020-05-07 | End: 2020-05-07

## 2020-05-07 RX ORDER — DEXTROSE MONOHYDRATE 25 G/50ML
12.5 INJECTION, SOLUTION INTRAVENOUS PRN
Status: DISCONTINUED | OUTPATIENT
Start: 2020-05-07 | End: 2020-05-08 | Stop reason: HOSPADM

## 2020-05-07 RX ORDER — NICOTINE POLACRILEX 4 MG
15 LOZENGE BUCCAL PRN
Status: DISCONTINUED | OUTPATIENT
Start: 2020-05-07 | End: 2020-05-08 | Stop reason: HOSPADM

## 2020-05-07 RX ORDER — SODIUM CHLORIDE 0.9 % (FLUSH) 0.9 %
10 SYRINGE (ML) INJECTION PRN
Status: DISCONTINUED | OUTPATIENT
Start: 2020-05-07 | End: 2020-05-08 | Stop reason: HOSPADM

## 2020-05-07 RX ORDER — ASPIRIN 81 MG/1
81 TABLET, CHEWABLE ORAL DAILY
Status: DISCONTINUED | OUTPATIENT
Start: 2020-05-07 | End: 2020-05-08 | Stop reason: HOSPADM

## 2020-05-07 RX ORDER — SODIUM CHLORIDE 0.9 % (FLUSH) 0.9 %
10 SYRINGE (ML) INJECTION ONCE
Status: DISCONTINUED | OUTPATIENT
Start: 2020-05-07 | End: 2020-05-08 | Stop reason: HOSPADM

## 2020-05-07 RX ADMIN — IOPAMIDOL 70 ML: 755 INJECTION, SOLUTION INTRAVENOUS at 08:50

## 2020-05-07 RX ADMIN — OXYCODONE HYDROCHLORIDE 60 MG: 20 TABLET, FILM COATED, EXTENDED RELEASE ORAL at 21:36

## 2020-05-07 RX ADMIN — ATORVASTATIN CALCIUM 40 MG: 40 TABLET, FILM COATED ORAL at 21:36

## 2020-05-07 RX ADMIN — ASPIRIN 81 MG 81 MG: 81 TABLET ORAL at 14:04

## 2020-05-07 RX ADMIN — CLOPIDOGREL 75 MG: 75 TABLET, FILM COATED ORAL at 15:54

## 2020-05-07 RX ADMIN — ASPIRIN 324 MG: 81 TABLET, CHEWABLE ORAL at 05:30

## 2020-05-07 RX ADMIN — SODIUM CHLORIDE, PRESERVATIVE FREE 10 ML: 5 INJECTION INTRAVENOUS at 21:36

## 2020-05-07 RX ADMIN — BENZOCAINE AND MENTHOL 1 LOZENGE: 15; 3.6 LOZENGE ORAL at 21:36

## 2020-05-07 RX ADMIN — INSULIN LISPRO 1 UNITS: 100 INJECTION, SOLUTION INTRAVENOUS; SUBCUTANEOUS at 15:52

## 2020-05-07 RX ADMIN — ENOXAPARIN SODIUM 40 MG: 40 INJECTION SUBCUTANEOUS at 14:04

## 2020-05-07 ASSESSMENT — PAIN DESCRIPTION - PAIN TYPE: TYPE: ACUTE PAIN

## 2020-05-07 ASSESSMENT — ENCOUNTER SYMPTOMS
ALLERGIC/IMMUNOLOGIC NEGATIVE: 1
EYES NEGATIVE: 1
RESPIRATORY NEGATIVE: 1
GASTROINTESTINAL NEGATIVE: 1

## 2020-05-07 ASSESSMENT — PAIN SCALES - GENERAL
PAINLEVEL_OUTOF10: 0
PAINLEVEL_OUTOF10: 7
PAINLEVEL_OUTOF10: 0

## 2020-05-07 ASSESSMENT — PAIN DESCRIPTION - DESCRIPTORS: DESCRIPTORS: ACHING;CONSTANT;DISCOMFORT

## 2020-05-07 ASSESSMENT — PAIN DESCRIPTION - PROGRESSION: CLINICAL_PROGRESSION: NOT CHANGED

## 2020-05-07 ASSESSMENT — PAIN DESCRIPTION - LOCATION: LOCATION: BACK

## 2020-05-07 ASSESSMENT — VISUAL ACUITY: VA_NORMAL: 1

## 2020-05-07 ASSESSMENT — PAIN DESCRIPTION - ONSET: ONSET: ON-GOING

## 2020-05-07 ASSESSMENT — PAIN DESCRIPTION - FREQUENCY: FREQUENCY: CONTINUOUS

## 2020-05-07 ASSESSMENT — PAIN DESCRIPTION - ORIENTATION: ORIENTATION: LOWER;MID

## 2020-05-07 NOTE — ED PROVIDER NOTES
HPI:  5/7/20,   Time: 3:55 AM EDT       Jatin Borjas is a 81 y.o. male presenting to the ED for numbness, beginning 1 hour ago. The complaint has been persistent, moderate in severity, and worsened by nothing. The patient states that approximately 1 hour prior to arrival he was laying in bed when he had sudden onset numbness to his right upper extremity. The patient states that he has a history of CVAs and felt as if he may be having one therefore he came to the ED to be evaluated. By the time he arrived to the emergency department the patient's symptoms are improving. Patient currently has an NIH is 0 at this time. Patient does have a history of CVAs and has stuttering from this but currently his speech is unchanged. Otherwise no other acute symptoms. Review of Systems:   Pertinent positives and negatives are stated within HPI, all other systems reviewed and are negative.          --------------------------------------------- PAST HISTORY ---------------------------------------------  Past Medical History:  has a past medical history of Arthritis, Back pain, Cerebral artery occlusion with cerebral infarction ([facility] [address] Depression, Diabetes mellitus ([facility] [address] Hx of blood clots, Hyperlipidemia, PFO (patent foramen ovale), S/P patent foramen ovale closure, Sleep apnea, and Testicular mass. Past Surgical History:  has a past surgical history that includes knee surgery (1981); Achilles tendon surgery (1975); Tonsillectomy (1950s); Colonoscopy (1990s); Hydrocele surgery (5/1/2002); Hydrocele surgery (10/24/2012); Testicle removal (6/19/2013); other surgical history (6/19/2013); Nerve Block (2/24/14); Nerve Block (3/10/14); Colonoscopy (7/28/2015); eye surgery; transesophageal echocardiogram (10/01/2018); other surgical history (02/14/2019); and Tooth Extraction. Social History:  reports that he has been smoking cigarettes and cigars. He started smoking about 16 years ago. He has a 14.00 pack-year smoking history. He has never used smokeless tobacco. He reports current alcohol use. He reports previous drug use. Family History: family history includes Diabetes in his father and maternal grandmother. The patients home medications have been reviewed. Allergies: Toradol [ketorolac tromethamine]        ---------------------------------------------------PHYSICAL EXAM--------------------------------------    Constitutional/General: Alert and oriented x3, well appearing, non toxic in NAD  Head: Normocephalic and atraumatic  Eyes: PERRL, EOMI, conjunctive normal, sclera non icteric  Mouth: Oropharynx clear, handling secretions, no trismus, no asymmetry of the posterior oropharynx or uvular edema  Neck: Supple, full ROM, non tender to palpation in the midline, no stridor, no crepitus, no meningeal signs  Respiratory: Lungs clear to auscultation bilaterally, no wheezes, rales, or rhonchi. Not in respiratory distress  Cardiovascular:  Regular rate. Regular rhythm. No murmurs, gallops, or rubs. 2+ distal pulses  GI:  Abdomen Soft, Non tender, Non distended. +BS. No organomegaly, no palpable masses,  No rebound, guarding, or rigidity. Musculoskeletal: Moves all extremities x 4. Warm and well perfused, no clubbing, cyanosis, or edema. Capillary refill <3 seconds  Integument: skin warm and dry. No rashes. Neurologic: GCS 15, no focal deficits, symmetric strength 5/5 in the upper and lower extremities bilaterally  Psychiatric: Normal Affect    -------------------------------------------------- RESULTS -------------------------------------------------  I have personally reviewed all laboratory and imaging results for this patient. Results are listed below.      LABS:  Results for orders placed or performed during the hospital encounter of 05/07/20   CBC Auto Differential   Result Value Ref Range    WBC 8.2 4.5 - 11.5 E9/L    RBC 4.04 3.80 - 5.80 E12/L    Hemoglobin 11.6 (L) 12.5 - 16.5 g/dL    Hematocrit 36.9 (L) 37.0 - 54.0 % Left axis deviation. Incomplete right bundle branch block. Nonspecific ST-T wave changes noted. No ST elevation or depressions. No STEMI.      ------------------------- NURSING NOTES AND VITALS REVIEWED ---------------------------   The nursing notes within the ED encounter and vital signs as below have been reviewed by myself. BP (!) 178/110   Pulse 91   Temp 98.8 °F (37.1 °C)   Resp 18   Ht 6' 2\" (1.88 m)   Wt (!) 360 lb (163.3 kg)   SpO2 97%   BMI 46.22 kg/m²   Oxygen Saturation Interpretation: Normal    The patients available past medical records and past encounters were reviewed. ------------------------------ ED COURSE/MEDICAL DECISION MAKING----------------------  Medications   aspirin chewable tablet 324 mg (has no administration in time range)         ED COURSE:       Medical Decision Making: This is a 82-year-old male who presented to the ED for numbness. Patient upon arrival had stable vital signs except for his blood pressure being elevated 178/110. Patient underwent laboratory work-up which revealed a normal CBC except for hemoglobin 11.6. Normal chemistry except for glucose of 181. Troponin negative. INR within normal limits. CT showed no acute injuries but did show old multiple small infarcts in the cerebellar hemispheres. Chest x-ray unremarkable. EKG showed no ischemic findings. Patient symptoms while in the emergency department completely resolved. Currently he has an NIH of 0. To the patient having neuro symptoms the patient be admitted for observation. Patient received aspirin in the emergency department.   Patient's case discussed with Dr. Moon Zayas who is accepted the patient to the hospital.    I, Dr. Lashonda Anderson, am the primary provider for this encounter    This patient's ED course included: a personal history and physicial examination, re-evaluation prior to disposition and multiple bedside re-evaluations    This patient has remained hemodynamically stable

## 2020-05-07 NOTE — H&P
1 tablet by mouth daily Last dose 4/26/15 2/6/20   Jovany Joseph MD   nicotine polacrilex (NICORETTE) 2 MG gum Take 1 each by mouth as needed for Smoking cessation 2/6/20   Jovany Joseph MD   buPROPion [facility] SR) 150 MG extended release tablet Take 150 mg by mouth daily    Historical Provider, MD   blood glucose monitor strips Test sugar daily pre-breakfast.  Patient not taking: Reported on 2/6/2020 8/27/19   Kim Parker MD   sildenafil (REVATIO) 20 MG tablet Take 1 tablet by mouth daily as needed (intercourse) Take 3 tablets an hour prior to intercourse  Patient not taking: Reported on 2/6/2020 5/3/19   Geetha Martinez MD   sildenafil (VIAGRA) 50 MG tablet Take 1 tablet by mouth daily as needed for Erectile Dysfunction  Patient not taking: Reported on 2/6/2020 5/3/19   Geetha Martinez MD   Blood Glucose Monitoring Suppl ARMANDO One glucose meter per insurance coverage, measure blood sugar twice daily morning and evening before eating meals. Diag 250.00  Patient not taking: Reported on 2/6/2020 2/20/15   Truman Ford MD   oxyCODONE (ROXICODONE) 5 MG immediate release tablet Take 5 mg by mouth every evening. [facility]. Historical Provider, MD   oxycodone (OXYCONTIN) 40 MG CR tablet 80 mg in am; 60 mg in pm-ordered through [facility]. Historical Provider, MD       Allergies: Toradol [ketorolac tromethamine]    Social History:   TOBACCO:   reports that he has been smoking cigarettes and cigars. He started smoking about 16 years ago. He has a 14.00 pack-year smoking history. He has never used smokeless tobacco.  ETOH:   reports current alcohol use.       Family History:       Problem Relation Age of Onset    Diabetes Father     Diabetes Maternal Grandmother        REVIEW OF SYSTEMS:    · Constitutional: No fever, no chills, no change in weight; good appetite  · HEENT: No blurred vision, no ear problems, no sore throat, no rhinorrhea. · Respiratory: No cough, no sputum production, no pleuritic chest pain, no shortness of breath  · Cardiology: No angina, no dyspnea on exertion, no paroxysmal nocturnal dyspnea, no orthopnea, no palpitation, no leg swelling. · Gastroenterology: No dysphagia, no reflux; no abdominal pain, no nausea or vomiting; no constipation or diarrhea.  No hematochezia   · Genitourinary: No dysuria, no frequency, hesitancy; no hematuria  · Musculoskeletal: no joint pain, no myalgia, no change in range of movement  · Neurology: As per HPI  · Endocrinology: no temperature intolerance, no polyphagia, polydipsia or polyuria  · Hematology: no increased bleeding, no bruising, no lymphadenopathy  · Skin: no skin changes noticed by patient  · Psychology: no depressed mood, no suicidal ideation    Physical Exam   · Vitals: /79   Pulse 66   Temp 97.7 °F (36.5 °C)   Resp 18   Ht 6' 2\" (1.88 m)   Wt (!) 360 lb (163.3 kg)   SpO2 96%   BMI 46.22 kg/m²     · General Appearance: alert and oriented to person, place and time, well-developed and well-nourished, in no acute distress  · Skin: warm and dry, no rash or erythema  · Head: normocephalic and atraumatic  · Pulmonary/Chest: clear to auscultation bilaterally- no wheezes, rales or rhonchi, normal air movement, no respiratory distress  · Cardiovascular: normal rate, regular rhythm, normal S1 and S2, no murmurs, no gallops, intact distal pulses and no carotid bruits  · Abdomen: soft, non-tender, non-distended, normal bowel sounds, no masses or organomegaly  · Extremities: no cyanosis, no clubbing and no edema  · Musculoskeletal: normal range of motion, no joint swelling, deformity or tenderness  · Neurologic: no cranial nerve deficit, stuttering speech (same as his baseline), gait and coordination normal, finger to nose normal, Babinski sign negative, sensory deficit present- decreased sensation in right arm and muscle weakness present- 4/5 in RUE    Neurologic Exam facility use at least one of these dose optimization techniques: automated exposure control; mA and/or kV adjustment per patient size (includes targeted exams where dose is matched to clinical indication); or iterative reconstruction. COMPARISON: CT HEAD WO CONTRAST 2018 4:00 PM FINDINGS: Brain: Multiple low-attenuation areas in bilateral cerebellar hemisphere similar to previous study likely representing old infarct. No CT evidence of acute infarct, mass, midline shift, mass effect. No CT evidence of acute infarct. Mild small vessel ischemic changes. Ventricles: Mild prominence of the ventricles and sulci representing mild volume loss. Bones/joints: Unremarkable. No acute fracture. Sinuses: Visualized sinuses are unremarkable. No fluid levels. Mastoid air cells: Trace fluid in right mastoid air cells. Soft tissues: Unremarkable. Old multiple small infarcts in bilateral cerebellar hemispheres. No CT evidence of acute infarct. This report has been electronically signed by Mable Rivera MD.    Xr Chest Portable    Result Date: 2020  Patient MRN:  [id number] : 1950 Age: 81 years Gender: Male Order Date:  2020 3:45 AM EXAM: XR CHEST PORTABLE INDICATION:  L arm weakness L arm weakness COMPARISON: 18 FINDINGS:  Heart size is normal. There are no infiltrates or effusions. Normal chest       EKG: normal sinus rhythm, nonspecific ST and T waves changes, incomplete RBBB. Resident's Assessment and Plan     Manny Aldana is a 81 y.o. male    1. Right upper arm numbness and worsening weakness rule out TIA versus ischemic stroke   - CT head showed old multiple small infarcts in bilateral cerebellar hemispheres but no evidence of acute infarct  2. History of left frontal lobe CVA () s/p tPA; on aspirin  3. PFO with grade 3-4 right to left shunt s/p PFO closure   - Last echo done in 2019 did not show any shunt by color Doppler or bubble study. No LA thrombus noted at the time  4.

## 2020-05-07 NOTE — PROGRESS NOTES
Patient practiced and was instructed in the following treatment:     Mobility as above    Pt was left sitting side of bed with call light left by patient. Pt's/ family goals   1. None stated. Patient and or family understand(s) diagnosis, prognosis, and plan of care. PLAN:    PT care will be provided in accordance with the objectives noted above. Exercises and functional mobility practice will be used as well as appropriate assistive devices or modalities to obtain goals. Patient and family education will also be administered as needed. Frequency of treatments: 2-5x/week x 1-2 weeks. Time in  1055  Time out  1107    Total Treatment Time  0 minutes     Evaluation Time includes thorough review of current medical information, gathering information on past medical history/social history and prior level of function, completion of standardized testing/informal observation of tasks, assessment of data and education on plan of care and goals.     CPT codes:  [x] Low Complexity PT evaluation 08833  [] Moderate Complexity PT evaluation 71243  [] High Complexity PT evaluation 54505  [] PT Re-evaluation 09007  [] Gait training 91801 ** minutes  [] Manual therapy 37619 ** minutes  [] Therapeutic activities 10864 ** minutes  [] Therapeutic exercises 63973 ** minutes  [] Neuromuscular reeducation 90829 ** minutes     Alex Tomas PT, DPT [id number]

## 2020-05-07 NOTE — CONSULTS
with Differential:    Lab Results   Component Value Date    WBC 8.2 05/07/2020    RBC 4.04 05/07/2020    HGB 11.6 05/07/2020    HCT 36.9 05/07/2020     05/07/2020    MCV 91.3 05/07/2020    MCH 28.7 05/07/2020    MCHC 31.4 05/07/2020    RDW 12.2 05/07/2020    SEGSPCT 60 08/29/2011    LYMPHOPCT 37.5 05/07/2020    MONOPCT 9.4 05/07/2020    BASOPCT 0.6 05/07/2020    MONOSABS 0.77 05/07/2020    LYMPHSABS 3.06 05/07/2020    EOSABS 0.17 05/07/2020    BASOSABS 0.05 05/07/2020     BMP:    Lab Results   Component Value Date     05/07/2020    K 3.9 05/07/2020    CL 99 05/07/2020    CO2 24 05/07/2020    BUN 12 05/07/2020    LABALBU 3.6 05/07/2020    LABALBU 4.3 03/04/2011    CREATININE 0.8 05/07/2020    CALCIUM 8.7 05/07/2020    GFRAA >60 05/07/2020    LABGLOM >60 05/07/2020    GLUCOSE 181 05/07/2020    GLUCOSE 138 08/29/2011       CT-head 5/7/2020: Old lacunar infarcts; no acute intracranial process    CT-A head and neck 5/7/2020: Unremarkable    I independently reviewed the labs and imaging studies at today's appointment. Assessment/Plan:   Assessment  1. TIA  2. Previous CVA with residual RUE weakness      Plan  1. MRI Brain  2. Continue aspirin and high-intensity statin  3. Check lipid panel   4.  A1c     Patient Active Problem List   Diagnosis    Diabetes mellitus type 2 in obese ([facility] [address]    Metabolic syndrome    Morbid obesity ([facility] [address]    Obstructive sleep apnea    PFO (patent foramen ovale)    Ischemic stroke of frontal lobe (HCC)    Erectile dysfunction    Hx of central retinal artery occlusion    Stuttering    TIA (transient ischemic attack)                Rikki Arriola DO PGY-1  10:41 AM  5/7/2020

## 2020-05-07 NOTE — PROCEDURES
Spoke with Jason on unit, patient is not on oxygen and can get into a wheelchair for Mobile Mri today, (he is over main MRI weight limit), We will need screening form filled out before 11am to clear him. Mobile MRI starts at 11 today.

## 2020-05-07 NOTE — ED NOTES
Pt c/o right arm numbness which has since resolved. Pt states he had 2 strokes previously and this is how they started. IV access established, labs obtained and sent. EKG completed and given to physician.  Pt awaiting [id number],[address], RN  05/07/20 4009

## 2020-05-08 VITALS
RESPIRATION RATE: 18 BRPM | SYSTOLIC BLOOD PRESSURE: 134 MMHG | BODY MASS INDEX: 40.43 KG/M2 | TEMPERATURE: 97.8 F | WEIGHT: 315 LBS | HEART RATE: 63 BPM | HEIGHT: 74 IN | DIASTOLIC BLOOD PRESSURE: 75 MMHG | OXYGEN SATURATION: 96 %

## 2020-05-08 LAB
ANION GAP SERPL CALCULATED.3IONS-SCNC: 13 MMOL/L (ref 7–16)
BUN BLDV-MCNC: 9 MG/DL (ref 8–23)
CALCIUM SERPL-MCNC: 8.4 MG/DL (ref 8.6–10.2)
CHLORIDE BLD-SCNC: 102 MMOL/L (ref 98–107)
CO2: 24 MMOL/L (ref 22–29)
CREAT SERPL-MCNC: 0.8 MG/DL (ref 0.7–1.2)
GFR AFRICAN AMERICAN: >60
GFR NON-AFRICAN AMERICAN: >60 ML/MIN/1.73
GLUCOSE BLD-MCNC: 142 MG/DL (ref 74–99)
HBA1C MFR BLD: 6.9 % (ref 4–5.6)
HCT VFR BLD CALC: 35.8 % (ref 37–54)
HEMOGLOBIN: 11.2 G/DL (ref 12.5–16.5)
LV EF: 63 %
LVEF MODALITY: NORMAL
MCH RBC QN AUTO: 28.7 PG (ref 26–35)
MCHC RBC AUTO-ENTMCNC: 31.3 % (ref 32–34.5)
MCV RBC AUTO: 91.8 FL (ref 80–99.9)
METER GLUCOSE: 140 MG/DL (ref 74–99)
METER GLUCOSE: 140 MG/DL (ref 74–99)
METER GLUCOSE: 141 MG/DL (ref 74–99)
PDW BLD-RTO: 12.2 FL (ref 11.5–15)
PLATELET # BLD: 243 E9/L (ref 130–450)
PMV BLD AUTO: 9.3 FL (ref 7–12)
POTASSIUM SERPL-SCNC: 4 MMOL/L (ref 3.5–5)
RBC # BLD: 3.9 E12/L (ref 3.8–5.8)
SODIUM BLD-SCNC: 139 MMOL/L (ref 132–146)
WBC # BLD: 5.5 E9/L (ref 4.5–11.5)

## 2020-05-08 PROCEDURE — 6360000002 HC RX W HCPCS: Performed by: INTERNAL MEDICINE

## 2020-05-08 PROCEDURE — 36415 COLL VENOUS BLD VENIPUNCTURE: CPT

## 2020-05-08 PROCEDURE — 99231 SBSQ HOSP IP/OBS SF/LOW 25: CPT | Performed by: INTERNAL MEDICINE

## 2020-05-08 PROCEDURE — 85027 COMPLETE CBC AUTOMATED: CPT

## 2020-05-08 PROCEDURE — 2580000003 HC RX 258: Performed by: INTERNAL MEDICINE

## 2020-05-08 PROCEDURE — 93306 TTE W/DOPPLER COMPLETE: CPT

## 2020-05-08 PROCEDURE — 80048 BASIC METABOLIC PNL TOTAL CA: CPT

## 2020-05-08 PROCEDURE — 96372 THER/PROPH/DIAG INJ SC/IM: CPT

## 2020-05-08 PROCEDURE — 83036 HEMOGLOBIN GLYCOSYLATED A1C: CPT

## 2020-05-08 PROCEDURE — G0378 HOSPITAL OBSERVATION PER HR: HCPCS

## 2020-05-08 PROCEDURE — 82962 GLUCOSE BLOOD TEST: CPT

## 2020-05-08 PROCEDURE — 6370000000 HC RX 637 (ALT 250 FOR IP): Performed by: INTERNAL MEDICINE

## 2020-05-08 PROCEDURE — 99226 PR SBSQ OBSERVATION CARE/DAY 35 MINUTES: CPT | Performed by: NURSE PRACTITIONER

## 2020-05-08 PROCEDURE — 6370000000 HC RX 637 (ALT 250 FOR IP): Performed by: PSYCHIATRY & NEUROLOGY

## 2020-05-08 RX ORDER — CLOPIDOGREL BISULFATE 75 MG/1
75 TABLET ORAL DAILY
Qty: 30 TABLET | Refills: 3 | Status: SHIPPED
Start: 2020-05-09 | End: 2020-05-27 | Stop reason: SDUPTHER

## 2020-05-08 RX ADMIN — ENOXAPARIN SODIUM 40 MG: 40 INJECTION SUBCUTANEOUS at 08:22

## 2020-05-08 RX ADMIN — SODIUM CHLORIDE, PRESERVATIVE FREE 10 ML: 5 INJECTION INTRAVENOUS at 08:23

## 2020-05-08 RX ADMIN — CLOPIDOGREL 75 MG: 75 TABLET, FILM COATED ORAL at 08:22

## 2020-05-08 RX ADMIN — BENZOCAINE AND MENTHOL 1 LOZENGE: 15; 3.6 LOZENGE ORAL at 00:53

## 2020-05-08 RX ADMIN — OXYCODONE HYDROCHLORIDE 80 MG: 80 TABLET, FILM COATED, EXTENDED RELEASE ORAL at 08:22

## 2020-05-08 RX ADMIN — ASPIRIN 81 MG 81 MG: 81 TABLET ORAL at 08:22

## 2020-05-08 ASSESSMENT — PAIN DESCRIPTION - PAIN TYPE: TYPE: CHRONIC PAIN

## 2020-05-08 ASSESSMENT — PAIN DESCRIPTION - ORIENTATION: ORIENTATION: LOWER;MID

## 2020-05-08 ASSESSMENT — PAIN DESCRIPTION - LOCATION: LOCATION: BACK

## 2020-05-08 ASSESSMENT — PAIN SCALES - GENERAL
PAINLEVEL_OUTOF10: 6
PAINLEVEL_OUTOF10: 0

## 2020-05-08 ASSESSMENT — PAIN DESCRIPTION - DESCRIPTORS: DESCRIPTORS: ACHING;CONSTANT;DISCOMFORT

## 2020-05-08 NOTE — PROGRESS NOTES
Pearl Burnham is a 81 y.o. right handed male     Neurology is following for TIA    PMH:  stroke; PFO s/p closure, LATISHA; tobacco abuse, DM    Patient presented with the sudden onset of right arm numbness and weakness. Symptoms resolved within 45 minutes. He had been on DAPT and instructed to stop Plavix in February of this year s/p PFO closure. He has a history of previous stroke left frontal lobe stroke. MRI of the brain showed no acute strokes. He notes a tingling sensation in his right upper extremity but otherwise is back to baseline (had minor right hand weakness and stuttering speech). No AF on telemetry. He admits to intermittently smoking and has not retested for his history of LATISHA. Echo pending he has been medically stable.     No chest pain or palpitations  No SOB  No vertigo, lightheadedness or loss of consciousness  No falls, tripping or stumbling  No incontinence of bowels or bladder  No itching or bruising appreciated    ROS otherwise negative     Current Facility-Administered Medications   Medication Dose Route Frequency Provider Last Rate Last Dose    aspirin chewable tablet 81 mg  81 mg Oral Daily Ross Quick MD   81 mg at 05/08/20 1276    atorvastatin (LIPITOR) tablet 40 mg  40 mg Oral Nightly Ross Quick MD   40 mg at 05/07/20 2136    sodium chloride flush 0.9 % injection 10 mL  10 mL Intravenous 2 times per day Ross Quick MD   10 mL at 05/08/20 5293    sodium chloride flush 0.9 % injection 10 mL  10 mL Intravenous PRN Ross Quick MD        polyethylene glycol (GLYCOLAX) packet 17 g  17 g Oral Daily PRN Ross Quick MD        promethazine (PHENERGAN) tablet 12.5 mg  12.5 mg Oral Q6H PRN Ross Quick MD        Or    ondansetron (ZOFRAN) injection 4 mg  4 mg Intravenous Q6H PRN Luisa Caballero MD        insulin lispro (HUMALOG) injection vial 0-6 Units  0-6 Units Subcutaneous TID  Ross Quick MD   1 Units at 05/07/20 1552    insulin lispro (HUMALOG) injection vial 0-3 recrudescence in pt with hx previous strokes and PFO closure   No acute events on MRI and exam back to baseline   Surface echo pending; embolic events must still be considered     History of LATISHA   Not on CPAP    Tobacco abuse    Plan:     F/u echo--if no worrisome findings, ok to d/c per neuro    Recommend lifelong DAPT and statin     Recommend extended cardiac monitoring if events reoccur on DAPT    Recommend treatment of LATISHA    Stop smoking and mod other vasc risk factors    Stroke clinic follow up--and then follow up with me as before     Luster [place], APRN-CNP   1:05 PM  5/8/2020

## 2020-05-08 NOTE — DISCHARGE SUMMARY
any thrombus or shunt.     Consults: neurology    Significant Diagnostic Studies:   CT head showed old multiple small infarcts in the cerebellar hemispheres but no acute injuries    CTA head and neck with contrast:  1.  Mild atherosclerotic disease . 2. Estimated stenosis by NASCET criteria is not hemodynamically   significant     MRI brain: No acute intracranial stroke, hemorrhage, or edema. Multiple chronic foci of lacunar stroke are associated with the   right and left cerebellar hemispheres as well as head of the caudate   nucleus on the right. Moderate burden of nonspecific, subcentimeter foci demonstrated   within the periventricular and subcortical white matter which are   suggestive of areas of chronic microvascular ischemic change. Echo complete: Normal left ventricular size and systolic function.   Ejection fraction is visually estimated at 60-65%.   Normal diastolic function. No shunt or thrombus noted      Treatments: none    Discharge Exam:  CONSTITUTIONAL:  awake, alert, cooperative, no apparent distress, and appears stated age  NECK:  supple, symmetrical, trachea midline  LUNGS:  No increased work of breathing, good air exchange, clear to auscultation bilaterally, no crackles or wheezing  CARDIOVASCULAR:  Normal apical impulse, regular rate and rhythm, normal S1 and S2, no S3 or S4, and no murmur noted  ABDOMEN:  No scars, normal bowel sounds, soft, non-distended, non-tender, no masses palpated, no hepatosplenomegally  MUSCULOSKELETAL:  there is no redness, warmth, or swelling of the joints  NEUROLOGIC:  Awake, alert, oriented to name, place and time. Cranial nerves II-XII are grossly intact. Motor is 5 out of 5 bilaterally. Cerebellar finger to nose, heel to shin intact. Sensory is intact.   Babinski down going, Romberg negative, and gait is normal.  SKIN:  no bruising or bleeding    Disposition: home    Patient Instructions:   [facility] Internal Medicine Resident Service    Discharge to:    Diet: diabetic  Activity: activity as tolerated   Exercise: As tolerated   Drive with seat belt on  Be compliant with medication  Follow up with Ambulatory Clinic, house team  On 05/20/2020  at  9:30 AM  Call [phone] ([address]  Internal Medicine Clinic) if there are any appointment changes or other issues. It is very important that you keep this appointment. Follow up with: neurology and stroke clinic    Discharge Medications:   Renetta Stewart   Home Medication Instructions IQQ:[id number]    Printed on:05/08/20 6858   Medication Information                      aspirin 81 MG tablet  Take 1 tablet by mouth daily Last dose 4/26/15             atorvastatin (LIPITOR) 40 MG tablet  Take 1 tablet by mouth nightly             blood glucose monitor strips  Test sugar daily pre-breakfast.             Blood Glucose Monitoring Suppl ARMANDO  One glucose meter per insurance coverage, measure blood sugar twice daily morning and evening before eating meals. Diag 250.00             clopidogrel (PLAVIX) 75 MG tablet  Take 1 tablet by mouth daily             lisinopril (PRINIVIL;ZESTRIL) 5 MG tablet  Take 1 tablet by mouth daily             meclizine (ANTIVERT) 25 MG tablet  Take 1 tablet by mouth 2 times daily as needed for Dizziness             metFORMIN (GLUCOPHAGE) 500 MG tablet  Take 1 tablet by mouth 2 times daily (with meals)             oxycodone (OXYCONTIN) 40 MG CR tablet  80 mg in am; 60 mg in pm-[facility]. oxyCODONE (ROXICODONE) 5 MG immediate release tablet  Take 5 mg by mouth every 6 hours as needed. [facility] through [facility]                 Activity: activity as tolerated    Diet: diabetic diet    Wound Care: none needed    Follow-up:   With IM clinic, neurology and stroke clinic    Electronically signed by Rakan Lundy MD on 5/8/20 at 2:15 PM EDT

## 2020-05-08 NOTE — CARE COORDINATION
Call placed to the patient at the bedside due to working remotely. Reviewed echocardiogram pending. Patient expressed understanding and stated he is anxious for the test as he would like to go home today. Patient lives in a two story home with four step entry with bed and bath on the second floor with his significant other Sandeep Wyman and her son Fred Jacobsen. Patient has a [employer], Raine Lennox, and crutches at home. Patient has a history of outpatient rehab but no [facility] or [facility]. Patient's PCP is Dr Qing Jean-Baptiste and he uses [facility] in [place] [address] for his prescriptions. Patient feels he is at baseline and has no needs going home. [employer] will provide transportation when patient is discharged. Will continue to follow.      Cherry [place].

## 2020-05-19 RX ORDER — SODIUM CHLORIDE 0.9 % (FLUSH) 0.9 %
10 SYRINGE (ML) INJECTION EVERY 12 HOURS SCHEDULED
Status: DISCONTINUED | OUTPATIENT
Start: 2020-05-19 | End: 2020-05-19

## 2020-05-19 RX ORDER — ATORVASTATIN CALCIUM 80 MG/1
80 TABLET, FILM COATED ORAL NIGHTLY
Status: ACTIVE | OUTPATIENT
Start: 2020-05-19

## 2020-05-19 RX ORDER — SODIUM CHLORIDE 0.9 % (FLUSH) 0.9 %
10 SYRINGE (ML) INJECTION PRN
Status: ACTIVE | OUTPATIENT
Start: 2020-05-19

## 2020-05-19 RX ORDER — ASPIRIN 81 MG/1
81 TABLET ORAL DAILY
Status: DISCONTINUED | OUTPATIENT
Start: 2020-05-19 | End: 2022-04-10

## 2020-05-19 RX ORDER — ONDANSETRON 2 MG/ML
4 INJECTION INTRAMUSCULAR; INTRAVENOUS EVERY 6 HOURS PRN
Status: ACTIVE | OUTPATIENT
Start: 2020-05-19

## 2020-05-19 RX ORDER — POLYETHYLENE GLYCOL 3350 17 G/17G
17 POWDER, FOR SOLUTION ORAL DAILY PRN
Status: DISCONTINUED | OUTPATIENT
Start: 2020-05-19 | End: 2022-04-09 | Stop reason: SDUPTHER

## 2020-05-19 RX ORDER — ASPIRIN 300 MG/1
300 SUPPOSITORY RECTAL DAILY
Status: DISCONTINUED | OUTPATIENT
Start: 2020-05-19 | End: 2022-04-10

## 2020-05-19 RX ORDER — PROMETHAZINE HYDROCHLORIDE 25 MG/1
12.5 TABLET ORAL EVERY 6 HOURS PRN
Status: ACTIVE | OUTPATIENT
Start: 2020-05-19

## 2020-05-19 RX ORDER — ACETAMINOPHEN 325 MG/1
650 TABLET ORAL EVERY 4 HOURS PRN
Status: ACTIVE | OUTPATIENT
Start: 2020-05-19

## 2020-05-19 RX ORDER — SODIUM CHLORIDE 0.9 % (FLUSH) 0.9 %
10 SYRINGE (ML) INJECTION EVERY 12 HOURS SCHEDULED
Status: ACTIVE | OUTPATIENT
Start: 2020-05-19

## 2020-05-19 RX ORDER — SODIUM CHLORIDE 0.9 % (FLUSH) 0.9 %
10 SYRINGE (ML) INJECTION PRN
Status: DISCONTINUED | OUTPATIENT
Start: 2020-05-19 | End: 2020-05-19

## 2020-05-20 ENCOUNTER — TELEPHONE (OUTPATIENT)
Dept: INTERNAL MEDICINE | Age: 70
End: 2020-05-20

## 2020-05-27 ENCOUNTER — OFFICE VISIT (OUTPATIENT)
Dept: INTERNAL MEDICINE | Age: 70
End: 2020-05-27
Payer: MEDICARE

## 2020-05-27 VITALS
BODY MASS INDEX: 40.43 KG/M2 | WEIGHT: 315 LBS | HEART RATE: 80 BPM | DIASTOLIC BLOOD PRESSURE: 70 MMHG | RESPIRATION RATE: 20 BRPM | TEMPERATURE: 98.4 F | SYSTOLIC BLOOD PRESSURE: 117 MMHG | HEIGHT: 74 IN

## 2020-05-27 PROCEDURE — 2022F DILAT RTA XM EVC RTNOPTHY: CPT | Performed by: INTERNAL MEDICINE

## 2020-05-27 PROCEDURE — 1123F ACP DISCUSS/DSCN MKR DOCD: CPT | Performed by: INTERNAL MEDICINE

## 2020-05-27 PROCEDURE — 99213 OFFICE O/P EST LOW 20 MIN: CPT | Performed by: INTERNAL MEDICINE

## 2020-05-27 PROCEDURE — 99215 OFFICE O/P EST HI 40 MIN: CPT | Performed by: INTERNAL MEDICINE

## 2020-05-27 PROCEDURE — G8427 DOCREV CUR MEDS BY ELIG CLIN: HCPCS | Performed by: INTERNAL MEDICINE

## 2020-05-27 PROCEDURE — 4040F PNEUMOC VAC/ADMIN/RCVD: CPT | Performed by: INTERNAL MEDICINE

## 2020-05-27 PROCEDURE — 3044F HG A1C LEVEL LT 7.0%: CPT | Performed by: INTERNAL MEDICINE

## 2020-05-27 PROCEDURE — 4004F PT TOBACCO SCREEN RCVD TLK: CPT | Performed by: INTERNAL MEDICINE

## 2020-05-27 PROCEDURE — G8417 CALC BMI ABV UP PARAM F/U: HCPCS | Performed by: INTERNAL MEDICINE

## 2020-05-27 PROCEDURE — 3017F COLORECTAL CA SCREEN DOC REV: CPT | Performed by: INTERNAL MEDICINE

## 2020-05-27 RX ORDER — CLOPIDOGREL BISULFATE 75 MG/1
75 TABLET ORAL DAILY
Qty: 60 TABLET | Refills: 3 | Status: SHIPPED
Start: 2020-05-27 | End: 2020-08-28 | Stop reason: ALTCHOICE

## 2020-05-27 RX ORDER — GLUCOSAMINE HCL/CHONDROITIN SU 500-400 MG
CAPSULE ORAL
Qty: 100 STRIP | Refills: 1 | Status: SHIPPED
Start: 2020-05-27 | End: 2020-05-27 | Stop reason: CLARIF

## 2020-05-27 RX ORDER — GLUCOSAMINE HCL/CHONDROITIN SU 500-400 MG
CAPSULE ORAL
Qty: 100 STRIP | Refills: 1 | Status: SHIPPED
Start: 2020-05-27 | End: 2020-05-27 | Stop reason: SDUPTHER

## 2020-05-27 RX ORDER — GLUCOSAMINE HCL/CHONDROITIN SU 500-400 MG
CAPSULE ORAL
Qty: 100 STRIP | Refills: 1 | Status: SHIPPED
Start: 2020-05-27 | End: 2021-03-23 | Stop reason: SDUPTHER

## 2020-05-27 RX ORDER — LISINOPRIL 5 MG/1
5 TABLET ORAL DAILY
Qty: 60 TABLET | Refills: 3 | Status: SHIPPED
Start: 2020-05-27 | End: 2020-11-03 | Stop reason: SDUPTHER

## 2020-05-27 ASSESSMENT — ENCOUNTER SYMPTOMS
CHOKING: 0
EYES NEGATIVE: 1
GASTROINTESTINAL NEGATIVE: 1
ALLERGIC/IMMUNOLOGIC NEGATIVE: 1
COUGH: 1

## 2020-05-27 NOTE — PROGRESS NOTES
Patient verbalized understanding of office instructions. He will call with questions or concerns. Pt was given discharge instructions, appointment for Foot [facility]. With address and phone number given  . All questions were fully answered. [address] worker did see pt. Today.  Printed AVS was given to pt  Per     Mikhail aj talked to [address] and new scripts needed for BS kit and strips with Dr. Jeanette Barros name on it for medicare purposes

## 2020-05-27 NOTE — PROGRESS NOTES
Normal pulses. Heart sounds: Normal heart sounds. Pulmonary:      Effort: Pulmonary effort is normal.      Breath sounds: Normal breath sounds. Abdominal:      General: Abdomen is flat. Palpations: Abdomen is soft. Musculoskeletal: Normal range of motion. Feet:    Skin:     General: Skin is warm and dry. Neurological:      General: No focal deficit present. Mental Status: He is alert and oriented to person, place, and time. Comments: Radiating pain on the left side of the leg          ASSESSMENT/PLAN:    I have reviewed all pertient PMHx, PSHx, FamHx, Social Hx, medications, and allergies andupdated history as appropriate. Moise Beard was seen today for check-up. Diagnoses and all orders for this visit:    Foreign body in foot   Small glass piece in the lateral side of left foot   Pain while ambulating   No associated swelling and erythema   Referral to podiatry     Recent TIA on 5/7/2020  History of CVA s/p TPA (2018), PFO s/p PFO closure with residual   Patient was on aspirin and Lipitor  Neurology added Plavix  Plan is to do Holter monitoring if patient still has TIA on DAPT  TTE done during this admission did not show thrombus or shunts    Diabetes mellitus  HbA1c 5/8/2020 6.9   Currently on metformin 5 mg twice a day  Patient did not tolerate increased dose of metformin or XR  Apprehension about daily injections      Essential hypertension  Continue lisinopril 5 mg    LATISHA noncompliant with CPAP    Benign prostatic hyperplasia  Not on any medications     Tobacco smoking  Still smokes     Lumbar radiculopathy, following with NS on opiate pain medication   MRI 1/2020  Impression       1. Mild central canal stenosis at L3-4.   2. Multilevel neural foraminal stenoses, worst (moderate-to-severe) at   the L5-S1 levels.      Erectile dysfunction      RTC:     I have reviewed my findings andrecommendations with Simran Melendez and Dr Shahid Garcia MD PGY-1  5/27/2020 10:48 AM

## 2020-05-27 NOTE — PROGRESS NOTES
treatment. Neurology was consulted for TIA, recommended life long DAPT, and holter monitor if repeat TIA even on DAPT. Echo complete did not show any thrombus or shunt. Noncompliant with CPAP for over a decade; stressed need for outpatient split study and patient; he is on chronic opiate therapy with pain clinic as well putting him at risk of central sleep apnea    Per attending note in house plan--cardiac event monitor and outpatient follow up   We called various cardiology offices- to get 30 day monitor set up  Will need cpap- titraton study will attempt to set up- + inc bmi/diet and weight loss (also sedation from opiods) - LATISHA untreated risk for CVAs  Dual antiplatelet , statin and BP control discussed  BS on metfomirn dm2-- recent aic 6.9  bp controlled , ldl 43   /70   Pulse 80   Temp 98.4 °F (36.9 °C) (Oral)   Resp 20   Ht 6' 2\" (1.88 m)   Wt (!) 345 lb (156.5 kg)   BMI 44.30 kg/m²     Normocytic aenmia more likely ACD, not iron def, iron studies reviewed , low TIBC, ferrtin OK, defer iron, B12 OK  He also today is presenting with new complaint   He thinks he stepped on glass- carpet floor sharp pain, ? splinter  Can be felt under skin  Diabetic (although intact sensation)  I dont' think I can remove in office, without opening up and creating more trauma  - so [facility]- requesting if we could get him in to podiatry ASAP  They did accomodate and appointment for him, referral made  >50min on case  Remainder of medical problems as per resident note.

## 2020-05-27 NOTE — PROGRESS NOTES
Met with pt as follow up as pt is known to LSW and has had financial concerns regarding medical bills. Pt with good affect and focus; he states for past 3 months since getting on QMB he has not had any outstanding bills other than dental.    Pt inquired about a letter he received re: $2.60 premium but unsure of origin. Advised pt to take picture of letter and send to LSW.     Left message with [id number] re: process and to schedule 30 day event monitor

## 2020-05-29 ENCOUNTER — TELEPHONE (OUTPATIENT)
Dept: INTERNAL MEDICINE | Age: 70
End: 2020-05-29

## 2020-08-27 ENCOUNTER — TELEPHONE (OUTPATIENT)
Dept: INTERNAL MEDICINE | Age: 70
End: 2020-08-27

## 2020-08-27 NOTE — PROGRESS NOTES
[facility] [address] [address]  InternalMedicine Residency Program  ACC Note      SUBJECTIVE:  CC: had no chief complaint listed for this encounter. Arti Arenas presented to the [facility] for a routine visit. - This is 80 yo M PMHx HTN, Hx left frontal lobe CVA (2018) s/p tPA. Hx PFO closure in 2019, recent TIA 05/2020, DM, LATISHA, Lumbar radiculopathy.     - Today patient has no concern. He denies chest pain, SOB, cough, dizziness or palpitation. There is no changes in bowel movement or urinary habits.     - Patient states moderate chronic low back pain. BP today 130/82. Patient is taking lisinopril 5 mg daily.     - Today A1c 6.4. Patient denies hypoglycemic symptoms.   - His BMI 45.12, patient states that he snores a lot at night, during daytime he sometimes feels tired   - Patient has Holter monitor recently that shows 1 event of Afib. Patient has chronic RUE weakness, no tingling or numbness sensation. Review of Systems   Constitutional: Negative for activity change, appetite change, chills and fever. HENT: Negative. Eyes: Negative for photophobia, discharge and visual disturbance. Respiratory: Negative for cough, choking, chest tightness and shortness of breath. Cardiovascular: Negative for chest pain, palpitations and leg swelling. Gastrointestinal: Negative for abdominal pain, constipation and diarrhea. Genitourinary: Negative for difficulty urinating and dysuria. Musculoskeletal: Negative for arthralgias, back pain and joint swelling. Skin: Negative for pallor and rash. Neurological: Negative for dizziness, seizures, weakness and numbness. Psychiatric/Behavioral: Negative for agitation.        Outpatient Medications Marked as Taking for the 8/28/20 encounter (Office Visit) with India Moses MD   Medication Sig Dispense Refill    apixaban (ELIQUIS) 5 MG TABS tablet Take 1 tablet by mouth 2 times daily 180 tablet 0    lisinopril (PRINIVIL;ZESTRIL) 5 MG tablet Take 1 tablet by mouth daily 60 tablet 3    blood glucose monitor strips 2 times daily. 100 strip 1    blood glucose monitor kit and supplies Test 2 times a day before breakfast and supper 1 kit 0    meclizine (ANTIVERT) 25 MG tablet Take 1 tablet by mouth 2 times daily as needed for Dizziness 60 tablet 2    atorvastatin (LIPITOR) 40 MG tablet Take 1 tablet by mouth nightly 90 tablet 3    metFORMIN (GLUCOPHAGE) 500 MG tablet Take 1 tablet by mouth 2 times daily (with meals) 180 tablet 3    aspirin 81 MG tablet Take 1 tablet by mouth daily Last dose 4/26/15 30 tablet 3    oxyCODONE (ROXICODONE) 5 MG immediate release tablet Take 5 mg by mouth every 6 hours as needed. [facility]dered through [facility]         I have reviewed all pertinent PMHx, PSHx, FamHx, SocialHx, medications, and allergiesand updated history as appropriate. OBJECTIVE:    VS: /82 (Site: Left Upper Arm, Position: Sitting, Cuff Size: Large Adult)   Pulse 80   Temp 97.4 °F (36.3 °C) (Oral)   Resp 16   Ht 6' 1\" (1.854 m)   Wt (!) 342 lb (155.1 kg)   BMI 45.12 kg/m²   Physical Exam  Constitutional:       General: He is not in acute distress. Appearance: He is obese. HENT:      Head: Normocephalic and atraumatic. Right Ear: Tympanic membrane normal.      Left Ear: Tympanic membrane normal.      Mouth/Throat:      Mouth: Mucous membranes are moist.      Pharynx: No oropharyngeal exudate or posterior oropharyngeal erythema. Eyes:      General:         Right eye: No discharge. Left eye: No discharge. Extraocular Movements: Extraocular movements intact. Pupils: Pupils are equal, round, and reactive to light. Neck:      Musculoskeletal: Normal range of motion. No neck rigidity or muscular tenderness. Vascular: No carotid bruit. Cardiovascular:      Rate and Rhythm: Normal rate and regular rhythm. Pulses: Normal pulses. Heart sounds: No murmur.    Pulmonary:      Effort: Pulmonary effort is normal. No Jennifer.     Nidia Lau MD PGY-1   8/27/2020 4:17 PM

## 2020-08-28 ENCOUNTER — OFFICE VISIT (OUTPATIENT)
Dept: INTERNAL MEDICINE | Age: 70
End: 2020-08-28
Payer: MEDICARE

## 2020-08-28 VITALS
HEART RATE: 80 BPM | SYSTOLIC BLOOD PRESSURE: 130 MMHG | RESPIRATION RATE: 16 BRPM | HEIGHT: 73 IN | TEMPERATURE: 97.4 F | BODY MASS INDEX: 41.75 KG/M2 | WEIGHT: 315 LBS | DIASTOLIC BLOOD PRESSURE: 82 MMHG

## 2020-08-28 LAB — HBA1C MFR BLD: 6.2 %

## 2020-08-28 PROCEDURE — 99214 OFFICE O/P EST MOD 30 MIN: CPT | Performed by: INTERNAL MEDICINE

## 2020-08-28 PROCEDURE — 99212 OFFICE O/P EST SF 10 MIN: CPT | Performed by: INTERNAL MEDICINE

## 2020-08-28 PROCEDURE — 83036 HEMOGLOBIN GLYCOSYLATED A1C: CPT | Performed by: INTERNAL MEDICINE

## 2020-08-28 RX ORDER — OXYCODONE HYDROCHLORIDE 60 MG/1
TABLET, FILM COATED, EXTENDED RELEASE ORAL
COMMUNITY
Start: 2020-08-20 | End: 2022-02-09

## 2020-08-28 RX ORDER — OXYCODONE HYDROCHLORIDE 80 MG/1
TABLET, FILM COATED, EXTENDED RELEASE ORAL
COMMUNITY
Start: 2020-08-20 | End: 2022-02-09

## 2020-08-28 ASSESSMENT — ENCOUNTER SYMPTOMS
CHEST TIGHTNESS: 0
BACK PAIN: 0
SHORTNESS OF BREATH: 0
DIARRHEA: 0
PHOTOPHOBIA: 0
CONSTIPATION: 0
CHOKING: 0
COUGH: 0
ABDOMINAL PAIN: 0
EYE DISCHARGE: 0

## 2020-08-28 ASSESSMENT — PATIENT HEALTH QUESTIONNAIRE - PHQ9
SUM OF ALL RESPONSES TO PHQ QUESTIONS 1-9: 0
2. FEELING DOWN, DEPRESSED OR HOPELESS: 0
SUM OF ALL RESPONSES TO PHQ QUESTIONS 1-9: 0
1. LITTLE INTEREST OR PLEASURE IN DOING THINGS: 0
SUM OF ALL RESPONSES TO PHQ9 QUESTIONS 1 & 2: 0

## 2020-08-28 NOTE — PATIENT INSTRUCTIONS
Start eliquis 5 mg twice daily as instructed  Lab work as instructed  Stop plavix as instructed  Continue all other medication  We will reschedule sleep study at [facility]  Please call if any questions or concerns

## 2020-08-28 NOTE — PROGRESS NOTES
[facility] [address]  Internal Medicine Clinic     Attending Physician Statement  I have discussed the case, including pertinent history and exam findings with the resident. I have seen and examined the patient and the key elements of the encounter have been performed by me. I agree with the assessment, plan and orders as documented by the resident. I have reviewed all pertinent PMHx, PSHx, FamHx, SocialHx, medications, and allergies and updated history as appropriate. Patient here for routine follow up of medical problems. 1. Essential HTN  2. DM, not insulin requiring, controlled. a1c 6.2.  3. CVA and recent TIA S/p PFO closure, on DAPT. Holter in 2018 negative for afib but recent 30 day monitor showed occult afib (40 minutes). Given stroke and the recent TIA, would start eliquis 5mg BID. He is on DAPT, I would stop plavix. 4. Following with pain clinic for chronic back pain  5. LATISHA, still not compliant with CPAP. Sleep test pending. Remainder of medical problems per resident's note. Genesis Low MD  8/28/2020 11:45 AM

## 2020-08-29 NOTE — PROGRESS NOTES
by mouth daily 60 tablet 3    blood glucose monitor strips 2 times daily. 100 strip 1    blood glucose monitor kit and supplies Test 2 times a day before breakfast and supper 1 kit 0    meclizine (ANTIVERT) 25 MG tablet Take 1 tablet by mouth 2 times daily as needed for Dizziness 60 tablet 2    atorvastatin (LIPITOR) 40 MG tablet Take 1 tablet by mouth nightly 90 tablet 3    metFORMIN (GLUCOPHAGE) 500 MG tablet Take 1 tablet by mouth 2 times daily (with meals) 180 tablet 3    aspirin 81 MG tablet Take 1 tablet by mouth daily Last dose 4/26/15 30 tablet 3    oxyCODONE (ROXICODONE) 5 MG immediate release tablet Take 5 mg by mouth every 6 hours as needed. [facility]dered through [facility]         I have reviewed all pertinent PMHx, PSHx, FamHx, SocialHx, medications, and allergiesand updated history as appropriate. OBJECTIVE:    VS: /82 (Site: Left Upper Arm, Position: Sitting, Cuff Size: Large Adult)   Pulse 80   Temp 97.4 °F (36.3 °C) (Oral)   Resp 16   Ht 6' 1\" (1.854 m)   Wt (!) 342 lb (155.1 kg)   BMI 45.12 kg/m²   Physical Exam  Constitutional:       General: He is not in acute distress. Appearance: He is obese. HENT:      Head: Normocephalic and atraumatic. Right Ear: Tympanic membrane normal.      Left Ear: Tympanic membrane normal.      Mouth/Throat:      Mouth: Mucous membranes are moist.      Pharynx: No oropharyngeal exudate or posterior oropharyngeal erythema. Eyes:      General:         Right eye: No discharge. Left eye: No discharge. Extraocular Movements: Extraocular movements intact. Pupils: Pupils are equal, round, and reactive to light. Neck:      Musculoskeletal: Normal range of motion. No neck rigidity or muscular tenderness. Vascular: No carotid bruit. Cardiovascular:      Rate and Rhythm: Normal rate and regular rhythm. Pulses: Normal pulses. Heart sounds: No murmur.    Pulmonary:      Effort: Pulmonary effort is normal. No respiratory distress. Breath sounds: No stridor. No wheezing or rales. Abdominal:      General: Bowel sounds are normal.      Palpations: Abdomen is soft. There is no mass. Tenderness: There is no abdominal tenderness. Musculoskeletal: Normal range of motion. General: No swelling or tenderness. Skin:     General: Skin is warm and dry. Neurological:      General: No focal deficit present. Mental Status: He is alert and oriented to person, place, and time. Psychiatric:         Mood and Affect: Mood normal.         PLAN:    1. HTN  - BP: Patient ernandez not have BP cuff at home. Today BP normal   - Meds: Lisinopril 5 mg   - Symptoms: Patient denies chest pain, SOB, headache, dizziness.   - Continue current regimen. 2. Hx recent TIA on 05/07/2020:  - Prior Hx left frontal lobe CVA (2018) s/p tPA. Hx PFO closure in 2019.   - Meds: Aspirin, plavix, lipitor 40 mg.  - Symptoms: RUE weakness  - TTE:  Normal left ventricular size and systolic function. Ejection fraction is visually estimated at 60-65%. No significant valvular abnormalities. No shunts. - Cardiac event monitor: Sinus rhythm at rate 82. 1 Afib event. -  Plan: stop plavix for now, start Eliquis 5 mg BID. 3. Diabetes mellitus. - Meds: metformin 500 mg BID. - HbA1c 05/08/2020: 6.9 -> today: 6.4  - Hypoglycemic symptoms: Patient denies symptoms. Patient also trying to loose weight, exercises, modify diet. - Plan: continue current medication. Check microalbuminuria     4. LATISHA:  - Symptoms: No daytime symptoms.   - Patient request sleep study next visit      5. Lumbar radiculopathy:   - Meds: Oxycodone 5 mg BID  - Lab:   1. Mild central canal stenosis at L3-4.   2. Multilevel neural foraminal stenoses, worst (moderate-to-severe) at   the L5-S1 levels.      6. Health maintenance:  - Diabetic microalbuminuria test  - Diabetic retinal exam      I have reviewed my findings and recommendations with Delma Bell and  Jennifer.     Edwin Taylor MD PGY-1   8/27/2020 4:17 PM

## 2020-10-16 RX ORDER — MECLIZINE HYDROCHLORIDE 25 MG/1
25 TABLET ORAL 2 TIMES DAILY PRN
Qty: 60 TABLET | Refills: 2 | Status: SHIPPED
Start: 2020-10-16 | End: 2021-03-23 | Stop reason: SDUPTHER

## 2020-11-03 ENCOUNTER — OFFICE VISIT (OUTPATIENT)
Dept: INTERNAL MEDICINE | Age: 70
End: 2020-11-03
Payer: MEDICARE

## 2020-11-03 VITALS
BODY MASS INDEX: 40.43 KG/M2 | DIASTOLIC BLOOD PRESSURE: 76 MMHG | WEIGHT: 315 LBS | RESPIRATION RATE: 20 BRPM | HEIGHT: 74 IN | HEART RATE: 80 BPM | TEMPERATURE: 97 F | SYSTOLIC BLOOD PRESSURE: 136 MMHG

## 2020-11-03 PROCEDURE — 99212 OFFICE O/P EST SF 10 MIN: CPT | Performed by: INTERNAL MEDICINE

## 2020-11-03 PROCEDURE — 4040F PNEUMOC VAC/ADMIN/RCVD: CPT | Performed by: INTERNAL MEDICINE

## 2020-11-03 PROCEDURE — 99214 OFFICE O/P EST MOD 30 MIN: CPT | Performed by: INTERNAL MEDICINE

## 2020-11-03 PROCEDURE — 1123F ACP DISCUSS/DSCN MKR DOCD: CPT | Performed by: INTERNAL MEDICINE

## 2020-11-03 PROCEDURE — G8427 DOCREV CUR MEDS BY ELIG CLIN: HCPCS | Performed by: INTERNAL MEDICINE

## 2020-11-03 PROCEDURE — G8484 FLU IMMUNIZE NO ADMIN: HCPCS | Performed by: INTERNAL MEDICINE

## 2020-11-03 PROCEDURE — 3017F COLORECTAL CA SCREEN DOC REV: CPT | Performed by: INTERNAL MEDICINE

## 2020-11-03 PROCEDURE — 4004F PT TOBACCO SCREEN RCVD TLK: CPT | Performed by: INTERNAL MEDICINE

## 2020-11-03 PROCEDURE — G8417 CALC BMI ABV UP PARAM F/U: HCPCS | Performed by: INTERNAL MEDICINE

## 2020-11-03 RX ORDER — ATORVASTATIN CALCIUM 40 MG/1
40 TABLET, FILM COATED ORAL NIGHTLY
Qty: 30 TABLET | Refills: 3 | Status: SHIPPED
Start: 2020-11-03 | End: 2021-03-23 | Stop reason: SDUPTHER

## 2020-11-03 RX ORDER — ASPIRIN 81 MG/1
81 TABLET ORAL DAILY
Qty: 30 TABLET | Refills: 3 | Status: SHIPPED
Start: 2020-11-03 | End: 2021-03-23 | Stop reason: SDUPTHER

## 2020-11-03 RX ORDER — LISINOPRIL 5 MG/1
5 TABLET ORAL DAILY
Qty: 30 TABLET | Refills: 3 | Status: SHIPPED
Start: 2020-11-03 | End: 2021-01-21 | Stop reason: SDUPTHER

## 2020-11-03 ASSESSMENT — ENCOUNTER SYMPTOMS
SORE THROAT: 0
RHINORRHEA: 0
DIARRHEA: 0
SHORTNESS OF BREATH: 0
BLOOD IN STOOL: 0
NAUSEA: 0
COUGH: 0
VOMITING: 0
EYE DISCHARGE: 0
BACK PAIN: 1
ALLERGIC/IMMUNOLOGIC NEGATIVE: 1
CHOKING: 0
CHEST TIGHTNESS: 0

## 2020-11-03 NOTE — PROGRESS NOTES
[facility] [address] [address]  InternalMedicine Residency Program  ACC Note      SUBJECTIVE:  CC: had concerns including Diabetes; Hypertension; Nicotine Dependence; Fatigue; Back Pain; and Cerebrovascular Accident. Marjorie Torres with past medical history of hypertension, multiple strokes/TIA, last TIA was in May 2020, status post PFO closure 2019, A. Fib on Eliquis, diabetes, LATISHA, lumbar radiculopathy presents for routine visits. Patient is taking lisinopril 5 mg for hypertension. Patient does not check blood pressures at home. Patient denies headache, chest pain, short of breath, leg swelling or change in visions. Patient is taking Metformin 500 mg twice daily for diabetes. Last A1c's was 6.4 in August 2020. Patient states that he does not know how to check blood sugar at home. Patient states he feels mild abdominal discomfort denies severe diarrhea or severe nausea vomiting. Patient just saw podiatrist last 2 weeks and he is prescribed gabapentin for neuropathy. Patient was also taking aspirin, Eliquis for A. fib/stroke. Patient denies any black tarry stool, blood in the urine, skin bruising. No chest pain or palpitation. Patient sleeps well at night and stated he is does not want sleep study. Review of Systems   Constitutional: Negative for activity change, appetite change, chills, fatigue and fever. HENT: Negative for rhinorrhea and sore throat. Eyes: Negative for discharge. Respiratory: Negative for cough, choking, chest tightness and shortness of breath. Cardiovascular: Negative for chest pain, palpitations and leg swelling. Gastrointestinal: Negative for blood in stool, diarrhea, nausea and vomiting. Endocrine: Negative for polyuria. Genitourinary: Negative for difficulty urinating and hematuria. Musculoskeletal: Positive for back pain. Skin:        No bruise or rash     Allergic/Immunologic: Negative.     Neurological: Negative for tremors, speech difficulty, Movements: Extraocular movements intact. Conjunctiva/sclera: Conjunctivae normal.      Pupils: Pupils are equal, round, and reactive to light. Neck:      Musculoskeletal: Normal range of motion and neck supple. Cardiovascular:      Rate and Rhythm: Normal rate and regular rhythm. Pulses: Normal pulses. Heart sounds: Normal heart sounds. No murmur. No friction rub. Pulmonary:      Effort: Pulmonary effort is normal. No respiratory distress. Breath sounds: Normal breath sounds. Abdominal:      General: Bowel sounds are normal.      Palpations: Abdomen is soft. There is no mass. Tenderness: There is no abdominal tenderness. Musculoskeletal: Normal range of motion. General: No swelling or tenderness. Skin:     General: Skin is warm. Findings: No bruising or erythema. Neurological:      General: No focal deficit present. Mental Status: He is alert and oriented to person, place, and time. Cranial Nerves: No cranial nerve deficit. Psychiatric:         Mood and Affect: Mood normal.       PLAN:  There are no diagnoses linked to this encounter. 1. HTN  - BP: Patient ernandez not have BP cuff at home. Today /76  - Meds: Lisinopril 5 mg daily  - Symptoms: Patient denies chest pain, SOB, headache, dizziness.   - Continue current regimen. - Follow up in 4 months.     2. Hx stroke 2018, recent TIA on 05/07/2020:  - Prior Hx left frontal lobe CVA (2018) s/p tPA. Hx PFO closure in 2019. Cardiac event monitor: Sinus rhythm at rate 82. 1 Afib event in August. Patient is taking Eliquis 5 mg BID and aspirin 81 mg daily. Patient denies black starry stools, blood in the urine or bruising.   - Symptoms: RUE weakness  - TTE:  Normal left ventricular size and systolic function.   Ejection fraction is visually estimated at 60-65%. No significant valvular abnormalities. No shunts. -  Plan: Continue Aspirin, Eliquis, Lipitor. Follow up in 4 months.      3. Diabetes mellitus. - Meds: metformin 500 mg BID. - HbA1c 08/2020: 6.4   - Hypoglycemic symptoms: Patient denies symptoms. Patient also trying to loose weight, exercises, modify diet. - Plan: continue current medication. Check microalbuminuria, HbA1c at the next visit.     4. LATISHA:  - Symptoms: No daytime symptoms. Patient sleep well at night.   - Patient refused sleep study. - Continue monitor.         5. Lumbar radiculopathy:   - Meds: Oxycodone 5 mg BID per pain's doctor. - Lab:   1. Mild central canal stenosis at L3-4.   2. Multilevel neural foraminal stenoses, worst (moderate-to-severe) at   the L5-S1 levels.    - No severe pain symptoms noticed. - Continue current regimen. 6. Health maintenance:  - Diabetic microalbuminuria, HbA1c test at next visit. - Patient refused flu vaccine today. - Diabetic foot exam last 2 week. Continue follow with podiatrist.     I have reviewed my findings and recommendations with Lorin Pool and Dr Vicki Barnes.      David Matthews MD PGY-1   11/3/2020 11:36 PM

## 2020-11-03 NOTE — PROGRESS NOTES
[facility] [address]  Internal Medicine Clinic    Attending Physician Statement:  Garry Hutchins M.D., F.A.C.P. I have discussed the case, including pertinent history and exam findings with the resident. I have seen and examined the patient and the key elements of the encounter have been performed by me. I agree with the assessment, plan and orders as documented by the resident. Patient is seen for fu visit today. Last office notes reviewed, relative labs and imaging. dm2-- aic 6.4 august- metformin 500bid  - tolerating, but mild nausea- \"acceptable\"    - podiatry seeing- last seen 2 weeks ago  /76 (Site: Left Upper Arm, Position: Sitting, Cuff Size: Large Adult)   Pulse 80   Temp 97 °F (36.1 °C) (Oral)   Resp 20   Ht 6' 2\" (1.88 m)   Wt (!) 346 lb (156.9 kg)   BMI 44.42 kg/m²   bp stable  On lipitor  On eliquis and ASA for CVA and afib. ACD mild hgb 11.2-- tibc low  DDD/lumbar radic- on opiods, doctor's pain clinic,   No problems with constipatiion, no black stools etc.  Remainder of medical problems as per resident note.

## 2020-11-03 NOTE — PATIENT INSTRUCTIONS
Patient Education        apixaban  Pronunciation:  konstantin PIX a ban  Brand:  Eliquis  What is the most important information I should know about apixaban? Apixaban increases your risk of severe or fatal bleeding, especially if you take certain medicines at the same time (including some over-the-counter medicines). It is very important to tell your doctor about all medicines you have recently used. Call your doctor at once if you have signs of bleeding such as: swelling, pain, feeling very weak or dizzy, bleeding gums, nosebleeds, heavy menstrual periods or abnormal vaginal bleeding, blood in your urine, bloody or tarry stools, coughing up blood or vomit that looks like coffee grounds, or any bleeding that will not stop. Apixaban can cause a very serious blood clot around your spinal cord if you undergo a spinal tap or receive spinal anesthesia (epidural), especially if you have a genetic spinal defect, if you have a spinal catheter in place, if you have a history of spinal surgery or repeated spinal taps, or if you are also using other drugs that can affect blood clotting. This type of blood clot can lead to long-term or permanent paralysis. Get emergency medical help if you have symptoms of a spinal cord blood clot such as back pain, numbness or muscle weakness in your lower body, or loss of bladder or bowel control. Do not stop taking apixaban unless your doctor tells you to. Stopping suddenly can increase your risk of blood clot or stroke. What is apixaban? Apixaban is used to lower the risk of stroke caused by a blood clot in people with a heart rhythm disorder called atrial fibrillation. Apixaban is also used after hip or knee replacement surgery to prevent a type of blood clot called deep vein thrombosis (DVT), which can lead to blood clots in the lungs (pulmonary embolism). Apixaban is also used to treat DVT or pulmonary embolism (PE), and to lower your risk of having a repeat DVT or PE.   Apixaban may also be used for purposes not listed in this medication guide. What should I discuss with my healthcare provider before taking apixaban? You should not take apixaban if you are allergic to it, or if you have active bleeding from a surgery, injury, or other cause. Apixaban may cause you to bleed more easily, especially if you have a bleeding disorder that is inherited or caused by disease. Tell your doctor if you have an artificial heart valve, or if you have ever had:  · liver or kidney disease;  · if you are older than 80; or  · if you weigh less than 132 pounds (60 kilograms). Apixaban can cause a very serious blood clot around your spinal cord if you undergo a spinal tap or receive spinal anesthesia (epidural). This type of blood clot could cause long-term paralysis, and may be more likely to occur if:    · you have a spinal catheter in place or if a catheter has been recently removed;  · you have a history of spinal surgery or repeated spinal taps;  · you have recently had a spinal tap or epidural anesthesia;  · you are taking an NSAID (nonsteroidal anti-inflammatory drug)--aspirin, ibuprofen (Advil, Motrin), naproxen (Aleve), diclofenac, indomethacin, meloxicam, and others; or  · you are using other medicines to treat or prevent blood clots. Taking apixaban may increase the risk of bleeding while you are pregnant or during your delivery. Tell your doctor if you are pregnant or plan to become pregnant. You should not breast-feed while using this medicine. How should I take apixaban? Follow all directions on your prescription label and read all medication guides or instruction sheets. Your doctor may occasionally change your dose. Use the medicine exactly as directed. You may take apixaban with or without food. If you cannot swallow a tablet whole, crush and mix it with water, apple juice, or a spoonful of applesauce. Swallow the mixture right away without chewing. Do not save it for later use.   A is made to that effect. Drug information contained herein may be time sensitive. [employer] information has been compiled for use by healthcare practitioners and consumers in the [place] and therefore [employer] does not warrant that uses outside of the [place] are appropriate, unless specifically indicated otherwise. [facility] drug information does not endorse drugs, diagnose patients or recommend therapy. [employer] drug information is an informational resource designed to assist licensed healthcare practitioners in caring for their patients and/or to serve consumers viewing this service as a supplement to, and not a substitute for, the expertise, skill, knowledge and judgment of healthcare practitioners. The absence of a warning for a given drug or drug combination in no way should be construed to indicate that the drug or drug combination is safe, effective or appropriate for any given patient. [facility] does not assume any responsibility for any aspect of healthcare administered with the aid of information [facility] provides. The information contained herein is not intended to cover all possible uses, directions, precautions, warnings, drug interactions, allergic reactions, or adverse effects. If you have questions about the drugs you are taking, check with your doctor, nurse or pharmacist.  Copyright 9919-5044 [address] [place] [address] Dr H[place]S. Version: 4.01. Revision date: 6/21/2019. Care instructions adapted under license by [facility] ([facility]). If you have questions about a medical condition or this instruction, always ask your healthcare professional. [address]  any warranty or liability for your use of this information. Please take medication as prescribed:  -Lisinopril 5 mg daily  -Metformin 500 mg twice a day  -Aspirin 81 mg daily  -Eliquis 5 mg twice a day    Check blood sugar in the morning before breakfast. 2 hours after meals and before going to bed.    Call office if notice dark starry stools, blood in the urine, or large bruising on the skin. Next appointment in 4 months.

## 2021-01-05 ENCOUNTER — OFFICE VISIT (OUTPATIENT)
Dept: PRIMARY CARE CLINIC | Age: 71
End: 2021-01-05
Payer: MEDICARE

## 2021-01-05 VITALS
WEIGHT: 315 LBS | HEIGHT: 74 IN | BODY MASS INDEX: 40.43 KG/M2 | HEART RATE: 85 BPM | SYSTOLIC BLOOD PRESSURE: 126 MMHG | TEMPERATURE: 98.2 F | OXYGEN SATURATION: 98 % | DIASTOLIC BLOOD PRESSURE: 78 MMHG

## 2021-01-05 DIAGNOSIS — R10.32 GROIN PAIN, LEFT: ICD-10-CM

## 2021-01-05 DIAGNOSIS — R10.31 RIGHT LOWER QUADRANT ABDOMINAL PAIN: Primary | ICD-10-CM

## 2021-01-05 PROCEDURE — G8417 CALC BMI ABV UP PARAM F/U: HCPCS | Performed by: NURSE PRACTITIONER

## 2021-01-05 PROCEDURE — 4004F PT TOBACCO SCREEN RCVD TLK: CPT | Performed by: NURSE PRACTITIONER

## 2021-01-05 PROCEDURE — 3017F COLORECTAL CA SCREEN DOC REV: CPT | Performed by: NURSE PRACTITIONER

## 2021-01-05 PROCEDURE — G8484 FLU IMMUNIZE NO ADMIN: HCPCS | Performed by: NURSE PRACTITIONER

## 2021-01-05 PROCEDURE — 1123F ACP DISCUSS/DSCN MKR DOCD: CPT | Performed by: NURSE PRACTITIONER

## 2021-01-05 PROCEDURE — 4040F PNEUMOC VAC/ADMIN/RCVD: CPT | Performed by: NURSE PRACTITIONER

## 2021-01-05 PROCEDURE — G8427 DOCREV CUR MEDS BY ELIG CLIN: HCPCS | Performed by: NURSE PRACTITIONER

## 2021-01-05 PROCEDURE — 99214 OFFICE O/P EST MOD 30 MIN: CPT | Performed by: NURSE PRACTITIONER

## 2021-01-05 NOTE — PROGRESS NOTES
2021     Rajeev Solomon 78 y.o. male   : 1950  Chief Complaint:   Abdominal Pain (right sided, left groin area also X6 weeks)       History of Present Illness:   Rajeev Solomon is a 78 y.o. male who presents to the office with complaints of right lower quadrant pain and left groin pain x6 weeks. Patient states that the pain is colicky rated about a 5 out of 10. Patient states that it occurs spontaneously and he describes the right lower quadrant pain as pulsating that is worsened with smoking. Patient also states that he has been constipated and has not had a bowel movement in the last 10 days. Patient has tried prune juice and raisins with no success. Patient is eating and drinking and tolerating fluids. Patient has a past medical history of a left testicle mass with testicle removal in . Patient states that the groin pain is intermittent and is brought on and alleviated by nothing. Patient has not tried any over-the-counter medications for symptomatic relief. Patient has a pertinent past medical history for CVA in 2018, hyperlipidemia, blood clots, PFO, and diabetes. Denies fever, chills, chest pain, shortness of breath, nausea, vomiting, diarrhea, paresthesias, or lethargy. Past Medical History:     Past Medical History:   Diagnosis Date    Arthritis     Back pain     injured back at work. on disability, CHRONIC , HERNIATED LUMBAR, H/O EPIDURAL INJECTIONS    Cerebral artery occlusion with cerebral infarction ([facility] [address]     [id number] - trouble talking, stutters right hand weak     Depression     pt states he s doing well at this time.  off his meds    Diabetes mellitus ([address]     Hx of blood clots     Hyperlipidemia     controlled     PFO (patent foramen ovale)     S/P patent foramen ovale closure     Sleep apnea     ordered cpap, but pt does not use it    Testicular mass     LEFT, removed surgically         Past Surgical History:   Procedure Laterality Date  ACHILLES TENDON SURGERY  1975    left foot, [place], [place]    COLONOSCOPY  1990s    multiple polyps (x 3), [facility]    COLONOSCOPY  7/28/2015    incomplete colonoscopy to mid ascending colon, BE xray ordered, Dr. Zamzam Morales, [address]      right eye sty removed    Patricia Sanchez  5/1/2002    Dr. Zamzam Morales, [address]  10/24/2012    left hydrocelectomy, Dr. Armida Capps, [place]   [address]    right knee (pathology unknown), [address] Del Cid  2/24/14    bilateral transforaminal nerve block lumbar #1    NERVE BLOCK  3/10/14    bilateral, transforaminal nerve block #2    OTHER SURGICAL HISTORY  6/19/2013    excisional dermal cyst face, Dr. Zamzam Morales, [address]  02/14/2019    Dr Carolyn Benito - PFO Closure - Amplatzer Occluder 35mm    TESTICLE REMOVAL  6/19/2013    left testicle, Dr. Armida Capps, [facility]    TONSILLECTOMY  1950s    TOOTH EXTRACTION      full    TRANSESOPHAGEAL ECHOCARDIOGRAM  10/01/2018       Family History   Problem Relation Age of Onset    Diabetes Father     Diabetes Maternal Grandmother        Social History     Tobacco Use    Smoking status: Current Every Day Smoker     Packs/day: 0.40     Years: 14.00     Pack years: 5.60     Types: Cigarettes, Cigars     Start date: 1/1/2004    Smokeless tobacco: Never Used    Tobacco comment:  smokes 4-5 cigarettes a day , cigars on occasion   Substance Use Topics    Alcohol use:  Yes     Alcohol/week: 0.0 standard drinks     Frequency: Monthly or less     Drinks per session: 1 or 2     Binge frequency: Never     Comment: rare has some whiskey at times    Drug use: Not Currently     Comment: none since age 30       Medications:     Current Outpatient Medications:     atorvastatin (LIPITOR) 40 MG tablet, Take 1 tablet by mouth nightly, Disp: 30 tablet, Rfl: 3    metFORMIN (GLUCOPHAGE) 500 MG tablet, Take 1 tablet by mouth 2 times daily (with meals), Disp: 60 tablet, Rfl: 3   aspirin EC 81 MG EC tablet, Take 1 tablet by mouth daily, Disp: 30 tablet, Rfl: 3    apixaban (ELIQUIS) 5 MG TABS tablet, Take 1 tablet by mouth 2 times daily, Disp: 60 tablet, Rfl: 3    meclizine (ANTIVERT) 25 MG tablet, Take 1 tablet by mouth 2 times daily as needed for Dizziness, Disp: 60 tablet, Rfl: 2    OXYCONTIN 60 MG T12A extended release tablet, , Disp: , Rfl:     OXYCONTIN 80 MG extended release tablet, , Disp: , Rfl:     blood glucose monitor strips, 2 times daily. , Disp: 100 strip, Rfl: 1    blood glucose monitor kit and supplies, Test 2 times a day before breakfast and supper, Disp: 1 kit, Rfl: 0    oxyCODONE (ROXICODONE) 5 MG immediate release tablet, Take 5 mg by mouth every 6 hours as needed. [facility] through [facility], Disp: , Rfl:     lisinopril (PRINIVIL;ZESTRIL) 5 MG tablet, Take 1 tablet by mouth daily (Patient not taking: Reported on 1/5/2021), Disp: 30 tablet, Rfl: 3    Allergies   Allergen Reactions    Toradol [Ketorolac Tromethamine] Other (See Comments)     hallucination       Review of Systems:   Unless otherwise stated in this report the patient's positive and negative responses for review of systems for constitutional, eyes, ENT, cardiovascular, respiratory, gastrointestinal, neurological, , musculoskeletal, and integument systems and related systems to the presenting problem are either stated in the history of present illness or were not pertinent or were negative for the symptoms and/or complaints related to the presenting medical problem. Positives and pertinent negatives as per HPI. All others reviewed and are negative. Physical Exam:   Vital Signs:  /78   Pulse 85   Temp 98.2 °F (36.8 °C)   Ht 6' 2\" (1.88 m)   Wt (!) 346 lb (156.9 kg)   SpO2 98%   BMI 44.42 kg/m²    Oxygen Saturation Interpretation: Normal.    Physical Exam  Vitals signs and nursing note reviewed. Constitutional:       Appearance: Normal appearance. He is obese.    HENT: Head: Normocephalic and atraumatic. Right Ear: External ear normal.      Left Ear: External ear normal.      Nose: Nose normal.      Mouth/Throat:      Mouth: Mucous membranes are moist.      Pharynx: Oropharynx is clear. Neck:      Musculoskeletal: Normal range of motion and neck supple. Trachea: Trachea normal.   Cardiovascular:      Rate and Rhythm: Normal rate and regular rhythm. Pulses: Normal pulses. Heart sounds: Normal heart sounds. Pulmonary:      Effort: Pulmonary effort is normal.      Breath sounds: Normal breath sounds. Abdominal:      General: Bowel sounds are normal. There is no abdominal bruit. Palpations: Abdomen is soft. Tenderness: There is abdominal tenderness in the right lower quadrant. Negative signs include Alexandra's sign. Hernia: No hernia is present. There is no hernia in the left inguinal area, right femoral area, left femoral area or right inguinal area. Comments: Exam is limited to due abdominal girth. No abdominal bruits auscultated. No pulsatile masses noted. Genitourinary:     Testes: Normal.         Right: Cremasteric reflex is present. Comments: No left testicle  Musculoskeletal: Normal range of motion. Lymphadenopathy:      Cervical: No cervical adenopathy. Skin:     General: Skin is warm and dry. Capillary Refill: Capillary refill takes less than 2 seconds. Neurological:      General: No focal deficit present. Mental Status: He is alert and oriented to person, place, and time. Sensory: Sensation is intact. Motor: Motor function is intact. Coordination: Coordination is intact. Gait: Gait is intact. Deep Tendon Reflexes: Reflexes normal.   Psychiatric:         Attention and Perception: Attention and perception normal.         Mood and Affect: Mood normal.         Speech: Speech normal.         Behavior: Behavior normal.         Thought Content:  Thought content normal. Cognition and Memory: Cognition and memory normal.         Judgment: Judgment normal.           Testing: All laboratory and radiology results have been personally reviewed by myself. Labs:  No results found for this visit on 01/05/21. Imaging: All Radiology results interpreted by Radiologist unless otherwise noted. No results found. Assessment/Plan:   I personally reviewed the patient's allergies, past medical history, medications, and vitals sign. Rajeev Elias was seen today for abdominal pain. Diagnoses and all orders for this visit:    Right lower quadrant abdominal pain    Groin pain, left        Go to the ED immediately for further work-up and evaluation. The patient was explained the limitations of ready care and was directed to go immediately to the emergency department. The patient verbalized understanding and was agreeable to this plan. The patient will go to [facility] by private vehicle. The patient is in stable condition upon discharge. Counseled regarding above diagnosis, including possible risks and complications,especially if left uncontrolled. Counseled regarding the possible side effects, risks, benefits and alternatives to treatment; patient and/or guardian verbalizes understanding. All questions answered.     KVNG Parkinson NP

## 2021-01-21 ENCOUNTER — HOSPITAL ENCOUNTER (EMERGENCY)
Age: 71
Discharge: HOME | End: 2021-01-21
Attending: EMERGENCY MEDICINE
Payer: MEDICARE

## 2021-01-21 VITALS
HEART RATE: 80 BPM | DIASTOLIC BLOOD PRESSURE: 88 MMHG | TEMPERATURE: 96.7 F | OXYGEN SATURATION: 97 % | RESPIRATION RATE: 18 BRPM | SYSTOLIC BLOOD PRESSURE: 176 MMHG

## 2021-01-21 DIAGNOSIS — R10.84 GENERALIZED ABDOMINAL PAIN: Primary | ICD-10-CM

## 2021-01-21 DIAGNOSIS — I10 ESSENTIAL HYPERTENSION: ICD-10-CM

## 2021-01-21 DIAGNOSIS — I10 HYPERTENSION, UNSPECIFIED TYPE: ICD-10-CM

## 2021-01-21 LAB
ALBUMIN SERPL-MCNC: 3.9 G/DL (ref 3.5–5.2)
ALP BLD-CCNC: 83 U/L (ref 40–129)
ALT SERPL-CCNC: 12 U/L (ref 0–40)
ANION GAP SERPL CALCULATED.3IONS-SCNC: 10 MMOL/L (ref 7–16)
AST SERPL-CCNC: 12 U/L (ref 0–39)
BASOPHILS ABSOLUTE: 0.03 E9/L (ref 0–0.2)
BASOPHILS RELATIVE PERCENT: 0.5 % (ref 0–2)
BILIRUB SERPL-MCNC: 0.3 MG/DL (ref 0–1.2)
BILIRUBIN URINE: NEGATIVE
BLOOD, URINE: NEGATIVE
BUN BLDV-MCNC: 15 MG/DL (ref 8–23)
CALCIUM SERPL-MCNC: 8.5 MG/DL (ref 8.6–10.2)
CHLORIDE BLD-SCNC: 102 MMOL/L (ref 98–107)
CLARITY: CLEAR
CO2: 24 MMOL/L (ref 22–29)
COLOR: YELLOW
CREAT SERPL-MCNC: 1.1 MG/DL (ref 0.7–1.2)
EKG ATRIAL RATE: 83 BPM
EKG P AXIS: 69 DEGREES
EKG P-R INTERVAL: 184 MS
EKG Q-T INTERVAL: 372 MS
EKG QRS DURATION: 94 MS
EKG QTC CALCULATION (BAZETT): 437 MS
EKG R AXIS: -16 DEGREES
EKG T AXIS: -15 DEGREES
EKG VENTRICULAR RATE: 83 BPM
EOSINOPHILS ABSOLUTE: 0.05 E9/L (ref 0.05–0.5)
EOSINOPHILS RELATIVE PERCENT: 0.8 % (ref 0–6)
GFR AFRICAN AMERICAN: >60
GFR NON-AFRICAN AMERICAN: >60 ML/MIN/1.73
GLUCOSE BLD-MCNC: 239 MG/DL (ref 74–99)
GLUCOSE URINE: NEGATIVE MG/DL
HCT VFR BLD CALC: 37.4 % (ref 37–54)
HEMOGLOBIN: 12.1 G/DL (ref 12.5–16.5)
IMMATURE GRANULOCYTES #: 0.01 E9/L
IMMATURE GRANULOCYTES %: 0.2 % (ref 0–5)
KETONES, URINE: NEGATIVE MG/DL
LEUKOCYTE ESTERASE, URINE: NEGATIVE
LYMPHOCYTES ABSOLUTE: 1.93 E9/L (ref 1.5–4)
LYMPHOCYTES RELATIVE PERCENT: 32.7 % (ref 20–42)
MCH RBC QN AUTO: 29.1 PG (ref 26–35)
MCHC RBC AUTO-ENTMCNC: 32.4 % (ref 32–34.5)
MCV RBC AUTO: 89.9 FL (ref 80–99.9)
MONOCYTES ABSOLUTE: 0.53 E9/L (ref 0.1–0.95)
MONOCYTES RELATIVE PERCENT: 9 % (ref 2–12)
NEUTROPHILS ABSOLUTE: 3.35 E9/L (ref 1.8–7.3)
NEUTROPHILS RELATIVE PERCENT: 56.8 % (ref 43–80)
NITRITE, URINE: NEGATIVE
PDW BLD-RTO: 12 FL (ref 11.5–15)
PH UA: 5.5 (ref 5–9)
PLATELET # BLD: 313 E9/L (ref 130–450)
PMV BLD AUTO: 9.1 FL (ref 7–12)
POTASSIUM REFLEX MAGNESIUM: 4.4 MMOL/L (ref 3.5–5)
PROTEIN UA: NEGATIVE MG/DL
RBC # BLD: 4.16 E12/L (ref 3.8–5.8)
SODIUM BLD-SCNC: 136 MMOL/L (ref 132–146)
SPECIFIC GRAVITY UA: >=1.03 (ref 1–1.03)
TOTAL PROTEIN: 7.4 G/DL (ref 6.4–8.3)
TROPONIN: <0.01 NG/ML (ref 0–0.03)
UROBILINOGEN, URINE: 1 E.U./DL
WBC # BLD: 5.9 E9/L (ref 4.5–11.5)

## 2021-01-21 PROCEDURE — 81003 URINALYSIS AUTO W/O SCOPE: CPT

## 2021-01-21 PROCEDURE — 84484 ASSAY OF TROPONIN QUANT: CPT

## 2021-01-21 PROCEDURE — 99285 EMERGENCY DEPT VISIT HI MDM: CPT

## 2021-01-21 PROCEDURE — 85025 COMPLETE CBC W/AUTO DIFF WBC: CPT

## 2021-01-21 PROCEDURE — 6370000000 HC RX 637 (ALT 250 FOR IP): Performed by: EMERGENCY MEDICINE

## 2021-01-21 PROCEDURE — 93010 ELECTROCARDIOGRAM REPORT: CPT | Performed by: INTERNAL MEDICINE

## 2021-01-21 PROCEDURE — 80053 COMPREHEN METABOLIC PANEL: CPT

## 2021-01-21 PROCEDURE — 93005 ELECTROCARDIOGRAM TRACING: CPT | Performed by: EMERGENCY MEDICINE

## 2021-01-21 RX ORDER — DICYCLOMINE HYDROCHLORIDE 10 MG/1
10 CAPSULE ORAL EVERY 6 HOURS PRN
Qty: 20 CAPSULE | Refills: 0 | Status: SHIPPED
Start: 2021-01-21 | End: 2021-03-23 | Stop reason: SDUPTHER

## 2021-01-21 RX ORDER — LISINOPRIL 10 MG/1
10 TABLET ORAL ONCE
Status: COMPLETED | OUTPATIENT
Start: 2021-01-21 | End: 2021-01-21

## 2021-01-21 RX ORDER — LISINOPRIL 5 MG/1
10 TABLET ORAL DAILY
Qty: 30 TABLET | Refills: 0 | Status: SHIPPED
Start: 2021-01-21 | End: 2021-03-23 | Stop reason: SDUPTHER

## 2021-01-21 RX ADMIN — LISINOPRIL 10 MG: 10 TABLET ORAL at 14:18

## 2021-01-21 ASSESSMENT — PAIN DESCRIPTION - LOCATION: LOCATION: ABDOMEN;GROIN

## 2021-01-21 NOTE — ED PROVIDER NOTES
HPI:  1/21/21,   Time: 12:16 PM EST       [facility] Kim is a 78 y.o. male presenting to the ED for abd pain, beginning 3 weeks ago. The complaint has been intermittent, mild in severity, and worsened by nothing. Diffuse crampy pain, seen at [facility] for same, no n/v/d/cp/sob/cough/congestion. .  Some genital pain, but  No dysuria or dc. No fever/chills/sweats. Pain moves all over abd, but only lasts a few minutes. Nothing seems to bring on    Review of Systems:   Pertinent positives and negatives are stated within HPI, all other systems reviewed and are negative.          --------------------------------------------- PAST HISTORY ---------------------------------------------  Past Medical History:  has a past medical history of Arthritis, Back pain, Cerebral artery occlusion with cerebral infarction ([facility] [address] Depression, Diabetes mellitus ([facility] [address] Hx of blood clots, Hyperlipidemia, PFO (patent foramen ovale), S/P patent foramen ovale closure, Sleep apnea, and Testicular mass. Past Surgical History:  has a past surgical history that includes knee surgery (1981); Achilles tendon surgery (1975); Tonsillectomy (1950s); Colonoscopy (1990s); Hydrocele surgery (5/1/2002); Hydrocele surgery (10/24/2012); Testicle removal (6/19/2013); other surgical history (6/19/2013); Nerve Block (2/24/14); Nerve Block (3/10/14); Colonoscopy (7/28/2015); eye surgery; transesophageal echocardiogram (10/01/2018); other surgical history (02/14/2019); and Tooth Extraction. Social History:  reports that he has been smoking cigarettes and cigars. He started smoking about 17 years ago. He has a 5.60 pack-year smoking history. He has never used smokeless tobacco. He reports current alcohol use. He reports previous drug use. Family History: family history includes Diabetes in his father and maternal grandmother. The patients home medications have been reviewed. Allergies:  Toradol [ketorolac tromethamine]        ---------------------------------------------------PHYSICAL EXAM--------------------------------------    Constitutional/General: Alert and oriented x3, well appearing, non toxic in NAD  Head: Normocephalic and atraumatic  Eyes: PERRL, EOMI, conjunctive normal, sclera non icteric  Mouth: Oropharynx clear, handling secretions, no trismus, no asymmetry of the posterior oropharynx or uvular edema  Neck: Supple, full ROM, non tender to palpation in the midline, no stridor, no crepitus, no meningeal signs  Respiratory: Lungs clear to auscultation bilaterally, no wheezes, rales, or rhonchi. Not in respiratory distress  Cardiovascular:  Regular rate. Regular rhythm. No murmurs, gallops, or rubs. 2+ distal pulses  Chest: No chest wall tenderness  GI:  Abdomen Soft, Non tender, Non distended. gu exam nml  Musculoskeletal: Moves all extremities x 4. Warm and well perfused, no clubbing, cyanosis, or edema. Capillary refill <3 seconds  Integument: skin warm and dry. No rashes. Lymphatic: no lymphadenopathy noted  Neurologic: GCS 15, no focal deficits, symmetric strength 5/5 in the upper and lower extremities bilaterally  Psychiatric: Normal Affect    -------------------------------------------------- RESULTS -------------------------------------------------  I have personally reviewed all laboratory and imaging results for this patient. Results are listed below.      LABS:  Results for orders placed or performed during the hospital encounter of 01/21/21   CBC Auto Differential   Result Value Ref Range    WBC 5.9 4.5 - 11.5 E9/L    RBC 4.16 3.80 - 5.80 E12/L    Hemoglobin 12.1 (L) 12.5 - 16.5 g/dL    Hematocrit 37.4 37.0 - 54.0 %    MCV 89.9 80.0 - 99.9 fL    MCH 29.1 26.0 - 35.0 pg    MCHC 32.4 32.0 - 34.5 %    RDW 12.0 11.5 - 15.0 fL    Platelets 300 693 - 471 E9/L    MPV 9.1 7.0 - 12.0 fL    Neutrophils % 56.8 43.0 - 80.0 %    Immature Granulocytes % 0.2 0.0 - 5.0 %    Lymphocytes % 32.7 20.0 - 42.0 % Monocytes % 9.0 2.0 - 12.0 %    Eosinophils % 0.8 0.0 - 6.0 %    Basophils % 0.5 0.0 - 2.0 %    Neutrophils Absolute 3.35 1.80 - 7.30 E9/L    Immature Granulocytes # 0.01 E9/L    Lymphocytes Absolute 1.93 1.50 - 4.00 E9/L    Monocytes Absolute 0.53 0.10 - 0.95 E9/L    Eosinophils Absolute 0.05 0.05 - 0.50 E9/L    Basophils Absolute 0.03 0.00 - 0.20 E9/L   Comprehensive Metabolic Panel w/ Reflex to MG   Result Value Ref Range    Sodium 136 132 - 146 mmol/L    Potassium reflex Magnesium 4.4 3.5 - 5.0 mmol/L    Chloride 102 98 - 107 mmol/L    CO2 24 22 - 29 mmol/L    Anion Gap 10 7 - 16 mmol/L    Glucose 239 (H) 74 - 99 mg/dL    BUN 15 8 - 23 mg/dL    CREATININE 1.1 0.7 - 1.2 mg/dL    GFR Non-African American >60 >=60 mL/min/1.73    GFR African American >60     Calcium 8.5 (L) 8.6 - 10.2 mg/dL    Total Protein 7.4 6.4 - 8.3 g/dL    Alb 3.9 3.5 - 5.2 g/dL    Total Bilirubin 0.3 0.0 - 1.2 mg/dL    Alkaline Phosphatase 83 40 - 129 U/L    ALT 12 0 - 40 U/L    AST 12 0 - 39 U/L   Urinalysis, reflex to microscopic   Result Value Ref Range    Color, UA Yellow Straw/Yellow    Clarity, UA Clear Clear    Glucose, Ur Negative Negative mg/dL    Bilirubin Urine Negative Negative    Ketones, Urine Negative Negative mg/dL    Specific Gravity, UA >=1.030 1.005 - 1.030    Blood, Urine Negative Negative    pH, UA 5.5 5.0 - 9.0    Protein, UA Negative Negative mg/dL    Urobilinogen, Urine 1.0 <2.0 E.U./dL    Nitrite, Urine Negative Negative    Leukocyte Esterase, Urine Negative Negative   Troponin   Result Value Ref Range    Troponin <0.01 0.00 - 0.03 ng/mL   EKG 12 Lead   Result Value Ref Range    Ventricular Rate 83 BPM    Atrial Rate 83 BPM    P-R Interval 184 ms    QRS Duration 94 ms    Q-T Interval 372 ms    QTc Calculation (Bazett) 437 ms    P Axis 69 degrees    R Axis -16 degrees    T Axis -15 degrees       RADIOLOGY:  Interpreted by Radiologist.  No orders to display       EKG: This EKG is signed and interpreted by the EP. Time: 1230  Rate: 80  Rhythm: Sinus  Interpretation: non-specific EKG  Comparison: None      ------------------------- NURSING NOTES AND VITALS REVIEWED ---------------------------   The nursing notes within the ED encounter and vital signs as below have been reviewed by myself. BP (!) 180/90   Pulse 83   Temp 95.5 °F (35.3 °C)   Resp 18   SpO2 96%   Oxygen Saturation Interpretation: Normal    The patients available past medical records and past encounters were reviewed. ------------------------------ ED COURSE/MEDICAL DECISION MAKING----------------------  Medications   lisinopril (PRINIVIL;ZESTRIL) tablet 10 mg (10 mg Oral Given 1/21/21 1418)         ED COURSE:       Medical Decision Making:    abd benign, sx for 3week, no emergent cause of sx. bp noted, will inc ace, dc on bentyl with outp fu. Pt agreeable with poc      This patient's ED course included: a personal history and physicial examination    This patient has remained hemodynamically stable during their ED course. Re-Evaluations:             Re-evaluation. Patients symptoms show no change    Re-examination  1/21/21   2:16 PM EST          Vital Signs:   Vitals:    01/21/21 1144 01/21/21 1149 01/21/21 1418   BP:  (!) 189/95 (!) 180/90   Pulse: 83     Resp:  18    Temp: 95.5 °F (35.3 °C)     SpO2: 96%       Card/Pulm:  Rhythm: normal rate. Heart Sounds: no murmurs, gallops, or rubs. clear to auscultation, no wheezes or rales and unlabored breathing. Capillary Refill: normal.  Radial Pulse:  equal.  Skin:  Warm. Consultations:                 Critical Care:         Counseling: The emergency provider has spoken with the patient and discussed todays results, in addition to providing specific details for the plan of care and counseling regarding the diagnosis and prognosis.   Questions are answered at this time and they are agreeable with the plan.       --------------------------------- IMPRESSION AND DISPOSITION ---------------------------------    IMPRESSION  1. Generalized abdominal pain    2. Hypertension, unspecified type    3. Essential hypertension        DISPOSITION  Disposition: Discharge to home  Patient condition is stable    NOTE: This report was transcribed using voice recognition software.  Every effort was made to ensure accuracy; however, inadvertent computerized transcription errors may be present        Florecita Rocha MD  01/21/21 [id number]

## 2021-03-23 ENCOUNTER — SOCIAL WORK (OUTPATIENT)
Dept: INTERNAL MEDICINE | Age: 71
End: 2021-03-23

## 2021-03-23 ENCOUNTER — OFFICE VISIT (OUTPATIENT)
Dept: INTERNAL MEDICINE | Age: 71
End: 2021-03-23
Payer: MEDICARE

## 2021-03-23 VITALS
DIASTOLIC BLOOD PRESSURE: 77 MMHG | RESPIRATION RATE: 12 BRPM | WEIGHT: 315 LBS | HEART RATE: 71 BPM | TEMPERATURE: 97.9 F | HEIGHT: 74 IN | SYSTOLIC BLOOD PRESSURE: 130 MMHG | BODY MASS INDEX: 40.43 KG/M2

## 2021-03-23 DIAGNOSIS — R10.12 LEFT UPPER QUADRANT ABDOMINAL PAIN: Primary | ICD-10-CM

## 2021-03-23 DIAGNOSIS — E78.5 HYPERLIPIDEMIA, UNSPECIFIED HYPERLIPIDEMIA TYPE: ICD-10-CM

## 2021-03-23 DIAGNOSIS — E66.9 DIABETES MELLITUS TYPE 2 IN OBESE (HCC): ICD-10-CM

## 2021-03-23 DIAGNOSIS — E11.69 DIABETES MELLITUS TYPE 2 IN OBESE (HCC): ICD-10-CM

## 2021-03-23 DIAGNOSIS — I10 ESSENTIAL HYPERTENSION: ICD-10-CM

## 2021-03-23 PROCEDURE — 2022F DILAT RTA XM EVC RTNOPTHY: CPT | Performed by: STUDENT IN AN ORGANIZED HEALTH CARE EDUCATION/TRAINING PROGRAM

## 2021-03-23 PROCEDURE — G8417 CALC BMI ABV UP PARAM F/U: HCPCS | Performed by: STUDENT IN AN ORGANIZED HEALTH CARE EDUCATION/TRAINING PROGRAM

## 2021-03-23 PROCEDURE — 3046F HEMOGLOBIN A1C LEVEL >9.0%: CPT | Performed by: STUDENT IN AN ORGANIZED HEALTH CARE EDUCATION/TRAINING PROGRAM

## 2021-03-23 PROCEDURE — 99213 OFFICE O/P EST LOW 20 MIN: CPT | Performed by: STUDENT IN AN ORGANIZED HEALTH CARE EDUCATION/TRAINING PROGRAM

## 2021-03-23 PROCEDURE — G8427 DOCREV CUR MEDS BY ELIG CLIN: HCPCS | Performed by: STUDENT IN AN ORGANIZED HEALTH CARE EDUCATION/TRAINING PROGRAM

## 2021-03-23 PROCEDURE — 3017F COLORECTAL CA SCREEN DOC REV: CPT | Performed by: STUDENT IN AN ORGANIZED HEALTH CARE EDUCATION/TRAINING PROGRAM

## 2021-03-23 PROCEDURE — G8484 FLU IMMUNIZE NO ADMIN: HCPCS | Performed by: STUDENT IN AN ORGANIZED HEALTH CARE EDUCATION/TRAINING PROGRAM

## 2021-03-23 PROCEDURE — 4040F PNEUMOC VAC/ADMIN/RCVD: CPT | Performed by: STUDENT IN AN ORGANIZED HEALTH CARE EDUCATION/TRAINING PROGRAM

## 2021-03-23 PROCEDURE — 1123F ACP DISCUSS/DSCN MKR DOCD: CPT | Performed by: STUDENT IN AN ORGANIZED HEALTH CARE EDUCATION/TRAINING PROGRAM

## 2021-03-23 PROCEDURE — 99212 OFFICE O/P EST SF 10 MIN: CPT | Performed by: STUDENT IN AN ORGANIZED HEALTH CARE EDUCATION/TRAINING PROGRAM

## 2021-03-23 PROCEDURE — 4004F PT TOBACCO SCREEN RCVD TLK: CPT | Performed by: STUDENT IN AN ORGANIZED HEALTH CARE EDUCATION/TRAINING PROGRAM

## 2021-03-23 RX ORDER — MECLIZINE HYDROCHLORIDE 25 MG/1
25 TABLET ORAL 2 TIMES DAILY PRN
Qty: 60 TABLET | Refills: 2 | Status: SHIPPED | OUTPATIENT
Start: 2021-03-23 | End: 2021-08-06 | Stop reason: SDUPTHER

## 2021-03-23 RX ORDER — LISINOPRIL 5 MG/1
10 TABLET ORAL DAILY
Qty: 30 TABLET | Refills: 0 | Status: SHIPPED
Start: 2021-03-23 | End: 2021-07-01 | Stop reason: SDUPTHER

## 2021-03-23 RX ORDER — SILDENAFIL 50 MG/1
50 TABLET, FILM COATED ORAL DAILY PRN
Qty: 15 TABLET | Refills: 0 | Status: SHIPPED | OUTPATIENT
Start: 2021-03-23 | End: 2022-02-09 | Stop reason: SDUPTHER

## 2021-03-23 RX ORDER — ATORVASTATIN CALCIUM 40 MG/1
40 TABLET, FILM COATED ORAL NIGHTLY
Qty: 30 TABLET | Refills: 3 | Status: SHIPPED | OUTPATIENT
Start: 2021-03-23 | End: 2021-08-06 | Stop reason: SDUPTHER

## 2021-03-23 RX ORDER — SENNA PLUS 8.6 MG/1
2 TABLET ORAL DAILY
Qty: 60 TABLET | Refills: 11 | Status: SHIPPED | OUTPATIENT
Start: 2021-03-23 | End: 2022-02-09

## 2021-03-23 RX ORDER — DICYCLOMINE HYDROCHLORIDE 10 MG/1
10 CAPSULE ORAL EVERY 6 HOURS PRN
Qty: 20 CAPSULE | Refills: 0 | Status: SHIPPED | OUTPATIENT
Start: 2021-03-23 | End: 2021-12-08

## 2021-03-23 RX ORDER — ASPIRIN 81 MG/1
81 TABLET ORAL DAILY
Qty: 30 TABLET | Refills: 3 | Status: SHIPPED | OUTPATIENT
Start: 2021-03-23 | End: 2021-08-23 | Stop reason: SDUPTHER

## 2021-03-23 RX ORDER — GLUCOSAMINE HCL/CHONDROITIN SU 500-400 MG
CAPSULE ORAL
Qty: 100 STRIP | Refills: 1 | Status: SHIPPED | OUTPATIENT
Start: 2021-03-23 | End: 2021-08-23 | Stop reason: SDUPTHER

## 2021-03-23 SDOH — ECONOMIC STABILITY: TRANSPORTATION INSECURITY
IN THE PAST 12 MONTHS, HAS THE LACK OF TRANSPORTATION KEPT YOU FROM MEDICAL APPOINTMENTS OR FROM GETTING MEDICATIONS?: NO

## 2021-03-23 ASSESSMENT — PATIENT HEALTH QUESTIONNAIRE - PHQ9
SUM OF ALL RESPONSES TO PHQ QUESTIONS 1-9: 0
2. FEELING DOWN, DEPRESSED OR HOPELESS: 0
SUM OF ALL RESPONSES TO PHQ QUESTIONS 1-9: 0

## 2021-03-23 ASSESSMENT — ENCOUNTER SYMPTOMS
SHORTNESS OF BREATH: 0
BACK PAIN: 1
COUGH: 0
SORE THROAT: 0
ABDOMINAL PAIN: 1
DIARRHEA: 0
BLOOD IN STOOL: 0
CONSTIPATION: 1
NAUSEA: 0
WHEEZING: 0
VOMITING: 0
ABDOMINAL DISTENTION: 1

## 2021-03-23 NOTE — PATIENT INSTRUCTIONS
Dear Frankie Alvarez,    Thank you for coming to your appointment today. I hope we have addressed all of your needs. Please make sure to do the following:  - Continue your medications as listed. - Get labs drawn before our next follow up. - We will see each other again in 3-4 weeks    Call for a sooner appointment if you develop worsening abdominal, fever, chills, or constipation. Have a great day! Sincerely,  Marciano Maravilla MD  3/23/2021  10:52 AM     Preventing Falls: Care Instructions  Your Care Instructions     Getting around your home safely can be a challenge if you have injuries or health problems that make it easy for you to fall. Loose rugs and furniture in walkways are among the dangers for many older people who have problems walking or who have poor eyesight. People who have conditions such as arthritis, osteoporosis, or dementia also have to be careful not to fall. You can make your home safer with a few simple measures. Follow-up care is a key part of your treatment and safety. Be sure to make and go to all appointments, and call your doctor if you are having problems. It's also a good idea to know your test results and keep a list of the medicines you take. How can you care for yourself at home? Taking care of yourself  · You may get dizzy if you do not drink enough water. To prevent dehydration, drink plenty of fluids. Choose water and other caffeine-free clear liquids. If you have kidney, heart, or liver disease and have to limit fluids, talk with your doctor before you increase the amount of fluids you drink. · Exercise regularly to improve your strength, muscle tone, and balance. Walk if you can. Swimming may be a good choice if you cannot walk easily. · Have your vision and hearing checked each year or any time you notice a change. If you have trouble seeing and hearing, you might not be able to avoid objects and could lose your balance.   · Know the side effects of the medicines you take. Ask your doctor or pharmacist whether the medicines you take can affect your balance. Sleeping pills or sedatives can affect your balance. · Limit the amount of alcohol you drink. Alcohol can impair your balance and other senses. · Ask your doctor whether calluses or corns on your feet need to be removed. If you wear loose-fitting shoes because of calluses or corns, you can lose your balance and fall. · Talk to your doctor if you have numbness in your feet. Preventing falls at home  · Remove raised doorway thresholds, throw rugs, and clutter. Repair loose carpet or raised areas in the floor. · Move furniture and electrical cords to keep them out of walking paths. · Use nonskid floor wax, and wipe up spills right away, especially on ceramic tile floors. · If you use a walker or cane, put rubber tips on it. If you use crutches, clean the bottoms of them regularly with an abrasive pad, such as steel wool. · Keep your house well lit, especially [place], and outside walkways. Use night-lights in areas such as hallways and bathrooms. Add extra light switches or use remote switches (such as switches that go on or off when you clap your hands) to make it easier to turn lights on if you have to get up during the night. · Install sturdy handrails on stairways. · Move items in your cabinets so that the things you use a lot are on the lower shelves (about waist level). · Keep a cordless phone and a flashlight with new batteries by your bed. If possible, put a phone in each of the main rooms of your house, or carry a cell phone in case you fall and cannot reach a phone. Or, you can wear a device around your neck or wrist. You push a button that sends a signal for help. · Wear low-heeled shoes that fit well and give your feet good support. Use footwear with nonskid soles. Check the heels and soles of your shoes for wear. Repair or replace worn heels or soles.   · Do not wear socks without shoes on wood floors. · Walk on the grass when the sidewalks are slippery. If you live in an area that gets snow and ice in the winter, sprinkle salt on slippery steps and sidewalks. Preventing falls in the bath  · Install grab bars and nonskid mats inside and outside your shower or tub and near the toilet and sinks. · Use shower chairs and bath benches. · Use a hand-held shower head that will allow you to sit while showering. · Get into a tub or shower by putting the weaker leg in first. Get out of a tub or shower with your strong side first.  · Repair loose toilet seats and consider installing a raised toilet seat to make getting on and off the toilet easier. · Keep your bathroom door unlocked while you are in the shower. Where can you learn more? Go to https://Manyeta.[employer]. org and sign in to your SpamLion account. Enter [id number] [id number] in the [employer] box to learn more about \"Preventing Falls: Care Instructions. \"     If you do not have an account, please click on the \"Sign Up Now\" link. Current as of: December 7, 2020               Content Version: 12.8  © 1164-9250 Healthwise, [employer]. Care instructions adapted under license by [facility] ([facility]). If you have questions about a medical condition or this instruction, always ask your healthcare professional. [place] [address] any warranty or liability for your use of this information. Patient Education        Abdominal Pain: Care Instructions  Your Care Instructions     Abdominal pain has many possible causes. Some aren't serious and get better on their own in a few days. Others need more testing and treatment. If your pain continues or gets worse, you need to be rechecked and may need more tests to find out what is wrong. You may need surgery to correct the problem. Don't ignore new symptoms, such as fever, nausea and vomiting, urination problems, pain that gets worse, and dizziness.  These may be signs of a more serious problem. Your doctor may have recommended a follow-up visit in the next 8 to 12 hours. If you are not getting better, you may need more tests or treatment. The doctor has checked you carefully, but problems can develop later. If you notice any problems or new symptoms, get medical treatment right away. Follow-up care is a key part of your treatment and safety. Be sure to make and go to all appointments, and call your doctor if you are having problems. It's also a good idea to know your test results and keep a list of the medicines you take. How can you care for yourself at home? · Rest until you feel better. · To prevent dehydration, drink plenty of fluids. Choose water and other caffeine-free clear liquids until you feel better. If you have kidney, heart, or liver disease and have to limit fluids, talk with your doctor before you increase the amount of fluids you drink. · If your stomach is upset, eat mild foods, such as rice, dry toast or crackers, bananas, and applesauce. Try eating several small meals instead of two or three large ones. · Wait until 48 hours after all symptoms have gone away before you have spicy foods, alcohol, and drinks that contain caffeine. · Do not eat foods that are high in fat. · Avoid anti-inflammatory medicines such as aspirin, ibuprofen (Advil, Motrin), and naproxen (Aleve). These can cause stomach upset. Talk to your doctor if you take daily aspirin for another health problem. When should you call for help? Call 911 anytime you think you may need emergency care. For example, call if:    · You passed out (lost consciousness).     · You pass maroon or very bloody stools.     · You vomit blood or what looks like coffee grounds.     · You have new, severe belly pain.    Call your doctor now or seek immediate medical care if:    · Your pain gets worse, especially if it becomes focused in one area of your belly.     · You have a new or higher fever.     · Your stools are black and look like tar, or they have streaks of blood.     · You have unexpected vaginal bleeding.     · You have symptoms of a urinary tract infection. These may include:  ? Pain when you urinate. ? Urinating more often than usual.  ? Blood in your urine.     · You are dizzy or lightheaded, or you feel like you may faint. Watch closely for changes in your health, and be sure to contact your doctor if:    · You are not getting better after 1 day (24 hours). Where can you learn more? Go to https://vitalclippewolfTellAparteb.[employer]. org and sign in to your FanGo account. Enter S181 in the MobiTX box to learn more about \"Abdominal Pain: Care Instructions. \"     If you do not have an account, please click on the \"Sign Up Now\" link. Current as of: February 26, 2020               Content Version: 12.8  © 2006-2021 Kid[employer]. Care instructions adapted under license by [facility] ([facility]). If you have questions about a medical condition or this instruction, always ask your healthcare professional. [address] any warranty or liability for your use of this information. Patient Education        High-Fiber Diet: Care Instructions  Your Care Instructions     A high-fiber diet may help you relieve constipation and feel less bloated. Your doctor and dietitian will help you make a high-fiber eating plan based on your personal needs. The plan will include the things you like to eat. It will also make sure that you get 30 grams of fiber a day. Before you make changes to the way you eat, be sure to talk with your doctor or dietitian. Follow-up care is a key part of your treatment and safety. Be sure to make and go to all appointments, and call your doctor if you are having problems. It's also a good idea to know your test results and keep a list of the medicines you take. How can you care for yourself at home?   · You can increase how much fiber you get if you eat more of certain foods. These foods include:  ? Whole-grain breads and cereals. ? Fruits, such as pears, apples, and peaches. Eat the skins, peels, and seeds, if you can.  ? Vegetables, such as broccoli, cabbage, spinach, carrots, asparagus, and squash. ? Starchy vegetables. These include potatoes with skins, kidney beans, and lima beans. · Take a fiber supplement every day if your doctor recommends it. Examples are Benefiber, Citrucel, FiberCon, and Metamucil. Ask your doctor how much to take. · Drink plenty of fluids. If you have kidney, heart, or liver disease and have to limit fluids, talk with your doctor before you increase the amount of fluids you drink. · Get some exercise every day. Exercise helps stool move through the colon. It also helps prevent constipation. · Keep a food diary. Try to notice and write down what foods cause gas, pain, or other symptoms. Then you can avoid these foods. Where can you learn more? Go to https://Alga EnergywalterMetaCure.[employer]. org and sign in to your UCWeb account. Enter D341 in the [facility] box to learn more about \"High-Fiber Diet: Care Instructions. \"     If you do not have an account, please click on the \"Sign Up Now\" link. Current as of: December 17, 2020               Content Version: 12.8  © 2004-3481 Healthwise, Incorporated. Care instructions adapted under license by [facility] ([facility]). If you have questions about a medical condition or this instruction, always ask your healthcare professional. [place] [address] any warranty or liability for your use of this information.

## 2021-03-23 NOTE — PROGRESS NOTES
[address]  [facility]    Attending Physician Statement  I have discussed the case, including pertinent history and exam findings with the resident physician. I saw and examined the patient with the resident. I agree with the assessment, plan and orders as documented by the resident. I have reviewed all pertinent PMHx, PSHx, FamHx, SocialHx, medications, and allergies and updated history as appropriate. Patient presents for routine follow up of medical problems. Abdominal pain - 6-7 months. LUQ - intermittent and worse with constipation. +bloating and increased flatulence. No change in bowel movements. No exacerbating factors. Was seen in ED for this. Bentyl was prescribed with some improvement. May be secondary to constipation. Patient reports adequate fluid intake, but not much fiber. Increase fiber intake by supplementation with Metamucil. Check plain film of abdomen. If no resolution with above conservative measures and dicyclomine, will need to check CT scan of abdomen to further assess pain. DM - home sugar at 140s. Needs POCT HbA1c    HTN - controlled   Continue lisinopril    History of DVT - stable   Continue apixaban. Vitals:    03/23/21 0914   BP: 130/77   Pulse: 71   Resp: 12   Temp: 97.9 °F (36.6 °C)   TempSrc: Oral   Weight: (!) 351 lb (159.2 kg)   Height: 6' 2\" (1.88 m)   Lungs:  CTA B  CVS:  +s1/s2 without m/g/r appreciated. Abd:  + BS, ND, No renal or aortic bruits +tenderness to palpation in LUQ of abdomen, but soft without any rebound or guarding. Extr:  2+ DP/PT pulses B, no pitting edema    Remainder of medical problems as per resident note.     Lyric Roque MD  3/23/2021 10:27 AM

## 2021-03-23 NOTE — PROGRESS NOTES
blood clots     Hyperlipidemia     controlled     PFO (patent foramen ovale)     S/P patent foramen ovale closure     Sleep apnea     ordered cpap, but pt does not use it    Testicular mass     LEFT, removed surgically 2015      Review of Systems   Constitutional: Negative for chills and fever. HENT: Negative for sore throat. Eyes: Negative for visual disturbance. Respiratory: Negative for cough, shortness of breath and wheezing. Cardiovascular: Negative for chest pain and leg swelling. Gastrointestinal: Positive for abdominal distention, abdominal pain and constipation. Negative for blood in stool, diarrhea, nausea and vomiting. Endocrine: Negative for polydipsia and polyuria. Genitourinary: Negative for difficulty urinating, dysuria, frequency and hematuria. Musculoskeletal: Positive for back pain. Skin: Negative for rash. Outpatient Medications Marked as Taking for the 3/23/21 encounter (Office Visit) with Jaci Ulloa MD   Medication Sig Dispense Refill    dicyclomine (BENTYL) 10 MG capsule Take 1 capsule by mouth every 6 hours as needed (Bowel spasms) 20 capsule 0    lisinopril (PRINIVIL;ZESTRIL) 5 MG tablet Take 2 tablets by mouth daily 30 tablet 0    atorvastatin (LIPITOR) 40 MG tablet Take 1 tablet by mouth nightly 30 tablet 3    metFORMIN (GLUCOPHAGE) 500 MG tablet Take 1 tablet by mouth 2 times daily (with meals) 60 tablet 3    aspirin EC 81 MG EC tablet Take 1 tablet by mouth daily 30 tablet 3    apixaban (ELIQUIS) 5 MG TABS tablet Take 1 tablet by mouth 2 times daily 60 tablet 3    meclizine (ANTIVERT) 25 MG tablet Take 1 tablet by mouth 2 times daily as needed for Dizziness 60 tablet 2    blood glucose monitor strips 2 times daily.  100 strip 1    senna (SENOKOT) 8.6 MG tablet Take 2 tablets by mouth daily 60 tablet 11    sildenafil (VIAGRA) 50 MG tablet Take 1 tablet by mouth daily as needed for Erectile Dysfunction 15 tablet 0    OXYCONTIN 60 MG T12A extended release tablet       OXYCONTIN 80 MG extended release tablet       oxyCODONE (ROXICODONE) 5 MG immediate release tablet Take 5 mg by mouth every 6 hours as needed. [facility]       Social History     Tobacco Use    Smoking status: Current Every Day Smoker     Packs/day: 0.40     Years: 14.00     Pack years: 5.60     Types: Cigarettes, Cigars     Start date: 1/1/2004    Smokeless tobacco: Never Used    Tobacco comment:  smokes 4-5 cigarettes a day , cigars on occasion   Substance Use Topics    Alcohol use: Yes     Alcohol/week: 0.0 standard drinks     Frequency: Monthly or less     Drinks per session: 1 or 2     Binge frequency: Never     Comment: rare has some whiskey at times    Drug use: Not Currently     Comment: none since age 30     I have reviewed all pertinent PMHx, PSHx, FamHx, SocialHx, medications, and allergies and updated history as appropriate. OBJECTIVE:    VS: /77   Pulse 71   Temp 97.9 °F (36.6 °C) (Oral)   Resp 12   Ht 6' 2\" (1.88 m)   Wt (!) 351 lb (159.2 kg)   BMI 45.07 kg/m²     Physical Exam  Constitutional:       General: He is not in acute distress. Appearance: Normal appearance. He is obese. HENT:      Head: Normocephalic. Eyes:      Extraocular Movements: Extraocular movements intact. Pupils: Pupils are equal, round, and reactive to light. Cardiovascular:      Rate and Rhythm: Normal rate and regular rhythm. Pulses: Normal pulses. Heart sounds: Normal heart sounds. No murmur. No gallop. Pulmonary:      Effort: Pulmonary effort is normal.      Breath sounds: Normal breath sounds. No wheezing, rhonchi or rales. Abdominal:      General: Bowel sounds are normal.      Palpations: Abdomen is soft. There is no splenomegaly or mass. Tenderness: There is abdominal tenderness in the left upper quadrant. There is no right CVA tenderness, left CVA tenderness, guarding or rebound.    Neurological:      Mental Status: He is alert.       ASSESSMENT/PLAN:    1. Left upper quadrant abdominal pain  Tenderness in the LUQ with no peritoneal signs. No fever. Relieved with Bentyl. Currently uses Oxycontin and is not on a bowel regimen. May be 2/2 decreased bowel motility and stool. - XR ABDOMEN (KUB) (SINGLE AP VIEW); Future  - Senna  - If not improved in 1-2 weeks may need imaging with CT A/P    2. Essential hypertension  Controlled, 130/77 today. - lisinopril (PRINIVIL;ZESTRIL) 5 MG tablet; Take 2 tablets by mouth daily  Dispense: 30 tablet; Refill: 0    3. Hyperlipidemia, unspecified hyperlipidemia type  - atorvastatin (LIPITOR) 40 MG tablet; Take 1 tablet by mouth nightly  Dispense: 30 tablet; Refill: 3    4. Diabetes mellitus type 2 in obese (HCC)  Last HgbA1c 6.2 on 8/28/20. Home glucose controlled in the 140's. - Continue Metformin 500 mg BID  - MICROALBUMIN, UR; Future  - HEMOGLOBIN A1C; Future    RTC:  In 4 weeks for PCP follow up. I have reviewed my findings and recommendations with Bonita Zimmerman and Dr. Corinne Carney.     Lul Neely MD, PGY-1  3/23/2021 6:16 PM

## 2021-03-23 NOTE — PROGRESS NOTES
Met with pt as follow up; mood, affect and appearance very good. Pt reports feeling better; did have questions re: his prescription copay/premium. We contacted Medicare and verified pt has Part A and B since 2. 1.2010; Part B premium paid by Medicaid and has Silver Scripts prescription coverage at highest level of coverage which includes $0 premium and highest cost of copays are   $3.70 for generic meds and $9.20 for brand name.   Pt expressed relief of not worrying anymore that he would lose prescription coverage as he had not paid Rx premium

## 2021-04-14 ENCOUNTER — TELEPHONE (OUTPATIENT)
Dept: INTERNAL MEDICINE | Age: 71
End: 2021-04-14

## 2021-07-01 DIAGNOSIS — I10 ESSENTIAL HYPERTENSION: ICD-10-CM

## 2021-07-01 RX ORDER — LISINOPRIL 5 MG/1
10 TABLET ORAL DAILY
Qty: 30 TABLET | Refills: 1 | Status: SHIPPED
Start: 2021-07-01 | End: 2021-08-23 | Stop reason: SDUPTHER

## 2021-08-06 DIAGNOSIS — E78.5 HYPERLIPIDEMIA, UNSPECIFIED HYPERLIPIDEMIA TYPE: ICD-10-CM

## 2021-08-06 RX ORDER — MECLIZINE HYDROCHLORIDE 25 MG/1
25 TABLET ORAL 2 TIMES DAILY PRN
Qty: 60 TABLET | Refills: 0 | Status: SHIPPED
Start: 2021-08-06 | End: 2022-02-09 | Stop reason: SDUPTHER

## 2021-08-06 RX ORDER — ATORVASTATIN CALCIUM 40 MG/1
40 TABLET, FILM COATED ORAL NIGHTLY
Qty: 30 TABLET | Refills: 0 | Status: SHIPPED
Start: 2021-08-06 | End: 2021-12-08 | Stop reason: SDUPTHER

## 2021-08-06 NOTE — TELEPHONE ENCOUNTER
Last Appointment:  3-23-21  Future Appointments   Date Time Provider Daniela Banerjee   8/23/2021 10:00 AM Lynette Gordon MD [facility]

## 2021-08-18 ENCOUNTER — HOSPITAL ENCOUNTER (OUTPATIENT)
Age: 71
Discharge: HOME | End: 2021-08-18
Payer: MEDICARE

## 2021-08-18 DIAGNOSIS — E66.9 DIABETES MELLITUS TYPE 2 IN OBESE (HCC): ICD-10-CM

## 2021-08-18 DIAGNOSIS — E11.69 DIABETES MELLITUS TYPE 2 IN OBESE (HCC): ICD-10-CM

## 2021-08-18 LAB
HBA1C MFR BLD: 6.5 % (ref 4–5.6)
MICROALBUMIN UR-MCNC: 20.2 MG/L

## 2021-08-18 PROCEDURE — 83036 HEMOGLOBIN GLYCOSYLATED A1C: CPT

## 2021-08-18 PROCEDURE — 36415 COLL VENOUS BLD VENIPUNCTURE: CPT

## 2021-08-18 PROCEDURE — 82044 UR ALBUMIN SEMIQUANTITATIVE: CPT

## 2021-08-23 ENCOUNTER — CARE COORDINATION (OUTPATIENT)
Dept: INTERNAL MEDICINE | Age: 71
End: 2021-08-23

## 2021-08-23 ENCOUNTER — OFFICE VISIT (OUTPATIENT)
Dept: INTERNAL MEDICINE | Age: 71
End: 2021-08-23
Payer: MEDICARE

## 2021-08-23 VITALS
TEMPERATURE: 98.6 F | BODY MASS INDEX: 40.43 KG/M2 | RESPIRATION RATE: 20 BRPM | OXYGEN SATURATION: 99 % | HEART RATE: 68 BPM | SYSTOLIC BLOOD PRESSURE: 125 MMHG | DIASTOLIC BLOOD PRESSURE: 62 MMHG | HEIGHT: 74 IN | WEIGHT: 315 LBS

## 2021-08-23 DIAGNOSIS — I10 ESSENTIAL HYPERTENSION: ICD-10-CM

## 2021-08-23 DIAGNOSIS — E66.9 DIABETES MELLITUS TYPE 2 IN OBESE (HCC): ICD-10-CM

## 2021-08-23 DIAGNOSIS — E66.01 OBESITY, CLASS III, BMI 40-49.9 (MORBID OBESITY) (HCC): ICD-10-CM

## 2021-08-23 DIAGNOSIS — E11.69 DIABETES MELLITUS TYPE 2 IN OBESE (HCC): ICD-10-CM

## 2021-08-23 DIAGNOSIS — I48.20 CHRONIC ATRIAL FIBRILLATION (HCC): ICD-10-CM

## 2021-08-23 DIAGNOSIS — E78.5 HYPERLIPIDEMIA, UNSPECIFIED HYPERLIPIDEMIA TYPE: Primary | ICD-10-CM

## 2021-08-23 PROCEDURE — 3017F COLORECTAL CA SCREEN DOC REV: CPT | Performed by: INTERNAL MEDICINE

## 2021-08-23 PROCEDURE — G8427 DOCREV CUR MEDS BY ELIG CLIN: HCPCS | Performed by: INTERNAL MEDICINE

## 2021-08-23 PROCEDURE — 1123F ACP DISCUSS/DSCN MKR DOCD: CPT | Performed by: INTERNAL MEDICINE

## 2021-08-23 PROCEDURE — 3044F HG A1C LEVEL LT 7.0%: CPT | Performed by: INTERNAL MEDICINE

## 2021-08-23 PROCEDURE — 2022F DILAT RTA XM EVC RTNOPTHY: CPT | Performed by: INTERNAL MEDICINE

## 2021-08-23 PROCEDURE — 4004F PT TOBACCO SCREEN RCVD TLK: CPT | Performed by: INTERNAL MEDICINE

## 2021-08-23 PROCEDURE — 4040F PNEUMOC VAC/ADMIN/RCVD: CPT | Performed by: INTERNAL MEDICINE

## 2021-08-23 PROCEDURE — 99213 OFFICE O/P EST LOW 20 MIN: CPT | Performed by: INTERNAL MEDICINE

## 2021-08-23 PROCEDURE — 99212 OFFICE O/P EST SF 10 MIN: CPT | Performed by: INTERNAL MEDICINE

## 2021-08-23 PROCEDURE — G8417 CALC BMI ABV UP PARAM F/U: HCPCS | Performed by: INTERNAL MEDICINE

## 2021-08-23 RX ORDER — ASPIRIN 81 MG/1
81 TABLET ORAL DAILY
Qty: 30 TABLET | Refills: 3 | Status: SHIPPED
Start: 2021-08-23 | End: 2022-02-09

## 2021-08-23 RX ORDER — GLUCOSAMINE HCL/CHONDROITIN SU 500-400 MG
CAPSULE ORAL
Qty: 100 STRIP | Refills: 1 | Status: SHIPPED | OUTPATIENT
Start: 2021-08-23

## 2021-08-23 RX ORDER — NICOTINE 21 MG/24HR
1 PATCH, TRANSDERMAL 24 HOURS TRANSDERMAL DAILY
Qty: 14 PATCH | Refills: 5 | Status: SHIPPED
Start: 2021-08-23 | End: 2022-02-09

## 2021-08-23 RX ORDER — LISINOPRIL 5 MG/1
10 TABLET ORAL DAILY
Qty: 30 TABLET | Refills: 1 | Status: SHIPPED
Start: 2021-08-23 | End: 2021-10-05 | Stop reason: SDUPTHER

## 2021-08-23 ASSESSMENT — ENCOUNTER SYMPTOMS
DIARRHEA: 0
COLOR CHANGE: 0
BACK PAIN: 0
VOMITING: 0
RHINORRHEA: 0
CONSTIPATION: 0
SHORTNESS OF BREATH: 0
COUGH: 0
ABDOMINAL PAIN: 0

## 2021-08-23 ASSESSMENT — SOCIAL DETERMINANTS OF HEALTH (SDOH): HOW HARD IS IT FOR YOU TO PAY FOR THE VERY BASICS LIKE FOOD, HOUSING, MEDICAL CARE, AND HEATING?: HARD

## 2021-08-23 NOTE — PROGRESS NOTES
Patient given instruction by Dr Fozia Bui. 4 months follow up scheduled. Printed AVS given to patient.

## 2021-08-23 NOTE — PROGRESS NOTES
[address]  Internal Medicine Clinic    Attending Physician Statement:  Jyothi Almeida M.D., F.A.C.P. I have discussed the case, including pertinent history and exam findings with the resident. I have seen and examined the patient and the key elements of the encounter have been performed by me. I agree with the assessment, plan and orders as documented by the resident. Patient is seen for fu visit today. Last office notes reviewed, relative labs and imaging. dm2 metformin 500bid-- aic 6.5  No change planned  noted  BS at home 120-140  Inc bmi, discussed diet and ex  ldl 43-- +HA and muscle ache 2 to lipitor  Might titrate down statin- recheck    /52 on meds  Also follows pain clinic, on opidos, NOT taking senna  Remainder of medical problems as per resident note.

## 2021-08-23 NOTE — PATIENT INSTRUCTIONS
Dear Juan Burgos,        Thank you for coming to your appointment today. I hope we have addressed all of your needs. Please make sure to do the following:  - Continue your medications as listed. - Get labs drawn before our next follow up. - We will see each other again in 4 months      Have a great day! Sincerely,  Jasvir Howard M.D PGY-2  8/23/2021  11:18 AM          Preventing Falls: Care Instructions  Your Care Instructions     Getting around your home safely can be a challenge if you have injuries or health problems that make it easy for you to fall. Loose rugs and furniture in walkways are among the dangers for many older people who have problems walking or who have poor eyesight. People who have conditions such as arthritis, osteoporosis, or dementia also have to be careful not to fall. You can make your home safer with a few simple measures. Follow-up care is a key part of your treatment and safety. Be sure to make and go to all appointments, and call your doctor if you are having problems. It's also a good idea to know your test results and keep a list of the medicines you take. How can you care for yourself at home? Taking care of yourself  · You may get dizzy if you do not drink enough water. To prevent dehydration, drink plenty of fluids. Choose water and other caffeine-free clear liquids. If you have kidney, heart, or liver disease and have to limit fluids, talk with your doctor before you increase the amount of fluids you drink. · Exercise regularly to improve your strength, muscle tone, and balance. Walk if you can. Swimming may be a good choice if you cannot walk easily. · Have your vision and hearing checked each year or any time you notice a change. If you have trouble seeing and hearing, you might not be able to avoid objects and could lose your balance. · Know the side effects of the medicines you take.  Ask your doctor or pharmacist whether the medicines you take can affect an area that gets snow and ice in the winter, sprinkle salt on slippery steps and sidewalks. Preventing falls in the bath  · Install grab bars and nonskid mats inside and outside your shower or tub and near the toilet and sinks. · Use shower chairs and bath benches. · Use a hand-held shower head that will allow you to sit while showering. · Get into a tub or shower by putting the weaker leg in first. Get out of a tub or shower with your strong side first.  · Repair loose toilet seats and consider installing a raised toilet seat to make getting on and off the toilet easier. · Keep your bathroom door unlocked while you are in the shower. Where can you learn more? Go to [email or web address] [email or web address].[email or web address] Pictures. org and sign in to your YooLotto account. Enter [id number] [id number] [phone] in the [facility] box to learn more about \"Preventing Falls: Care Instructions. \"     If you do not have an account, please click on the \"Sign Up Now\" link. Current as of: December 7, 2020               Content Version: 12.9  © 7044-4655 Healthwise, Incorporated. Care instructions adapted under license by [facility] ([facility]). If you have questions about a medical condition or this instruction, always ask your healthcare professional. [address] any warranty or liability for your use of this information.

## 2021-08-23 NOTE — CARE COORDINATION
Lab Results   Component Value Date    LABA1C 6.5 (H) 08/18/2021    LABA1C 6.2 08/28/2020    LABA1C 6.9 (H) 05/08/2020       Met with Regan Kent who is a type 2 Diabetic, He does not have time to stay today but is interested in going to diabetic education classes. Notified Randy Carl that Regan Kent would like DM Ed classes ordered.

## 2021-08-24 ENCOUNTER — HOSPITAL ENCOUNTER (OUTPATIENT)
Age: 71
Discharge: HOME | End: 2021-08-24
Payer: MEDICARE

## 2021-08-24 DIAGNOSIS — E78.5 HYPERLIPIDEMIA, UNSPECIFIED HYPERLIPIDEMIA TYPE: ICD-10-CM

## 2021-08-24 LAB
CHOLESTEROL, TOTAL: 155 MG/DL (ref 0–199)
HDLC SERPL-MCNC: 55 MG/DL
LDL CHOLESTEROL CALCULATED: 81 MG/DL (ref 0–99)
TRIGL SERPL-MCNC: 95 MG/DL (ref 0–149)
VLDLC SERPL CALC-MCNC: 19 MG/DL

## 2021-08-24 PROCEDURE — 36415 COLL VENOUS BLD VENIPUNCTURE: CPT

## 2021-08-24 PROCEDURE — 80061 LIPID PANEL: CPT

## 2021-10-05 DIAGNOSIS — I10 ESSENTIAL HYPERTENSION: ICD-10-CM

## 2021-10-06 RX ORDER — LISINOPRIL 5 MG/1
10 TABLET ORAL DAILY
Qty: 60 TABLET | Refills: 3 | Status: SHIPPED
Start: 2021-10-06 | End: 2021-12-10 | Stop reason: SINTOL

## 2021-11-16 ENCOUNTER — TELEPHONE (OUTPATIENT)
Dept: INTERNAL MEDICINE | Age: 71
End: 2021-11-16

## 2021-11-16 NOTE — TELEPHONE ENCOUNTER
----- Message from Alden Lares sent at 11/16/2021  2:46 PM EST -----  Subject: Message to Provider    QUESTIONS  Information for Provider? Pt has an appt booked for 01/12/22 at 10am and   is requesting to be seen urgently as he has had a cough, sore throat and   flem buildup for a month now. Pt would like to be recommended a walk in   clinic nearby if not available to be seen today or tomorrow. Pt had a   family member pass two days ago with similar symptoms from a bacteria   infection and he is concerned of his symptoms as well.  ---------------------------------------------------------------------------  --------------  CALL BACK INFO  What is the best way for the office to contact you? Do not leave any   message, patient will call back for answer  Preferred Call Back Phone Number? [phone]  ---------------------------------------------------------------------------  --------------  SCRIPT ANSWERS  Relationship to Patient?  Self

## 2021-11-17 ENCOUNTER — HOSPITAL ENCOUNTER (OUTPATIENT)
Dept: GENERAL RADIOLOGY | Age: 71
Discharge: HOME | End: 2021-11-19
Payer: MEDICARE

## 2021-11-17 ENCOUNTER — OFFICE VISIT (OUTPATIENT)
Dept: PRIMARY CARE CLINIC | Age: 71
End: 2021-11-17
Payer: MEDICARE

## 2021-11-17 ENCOUNTER — HOSPITAL ENCOUNTER (OUTPATIENT)
Age: 71
Discharge: HOME | End: 2021-11-19
Payer: MEDICARE

## 2021-11-17 VITALS
HEART RATE: 75 BPM | HEIGHT: 72 IN | SYSTOLIC BLOOD PRESSURE: 141 MMHG | BODY MASS INDEX: 42.66 KG/M2 | WEIGHT: 315 LBS | TEMPERATURE: 97.3 F | DIASTOLIC BLOOD PRESSURE: 84 MMHG | OXYGEN SATURATION: 97 %

## 2021-11-17 DIAGNOSIS — R05.9 COUGH: Primary | ICD-10-CM

## 2021-11-17 DIAGNOSIS — R10.9 FLANK PAIN: ICD-10-CM

## 2021-11-17 DIAGNOSIS — R05.9 COUGH: ICD-10-CM

## 2021-11-17 LAB
APPEARANCE FLUID: CLEAR
BILIRUBIN, POC: NORMAL
BLOOD URINE, POC: NORMAL
CLARITY, POC: CLEAR
COLOR, POC: YELLOW
GLUCOSE URINE, POC: NORMAL
KETONES, POC: NORMAL
LEUKOCYTE EST, POC: NORMAL
NITRITE, POC: NORMAL
PH, POC: 5.5
PROTEIN, POC: NORMAL
SPECIFIC GRAVITY, POC: >=1.03
UROBILINOGEN, POC: 0.2

## 2021-11-17 PROCEDURE — G8427 DOCREV CUR MEDS BY ELIG CLIN: HCPCS | Performed by: NURSE PRACTITIONER

## 2021-11-17 PROCEDURE — 1123F ACP DISCUSS/DSCN MKR DOCD: CPT | Performed by: NURSE PRACTITIONER

## 2021-11-17 PROCEDURE — 3017F COLORECTAL CA SCREEN DOC REV: CPT | Performed by: NURSE PRACTITIONER

## 2021-11-17 PROCEDURE — 99213 OFFICE O/P EST LOW 20 MIN: CPT | Performed by: NURSE PRACTITIONER

## 2021-11-17 PROCEDURE — G8484 FLU IMMUNIZE NO ADMIN: HCPCS | Performed by: NURSE PRACTITIONER

## 2021-11-17 PROCEDURE — G8417 CALC BMI ABV UP PARAM F/U: HCPCS | Performed by: NURSE PRACTITIONER

## 2021-11-17 PROCEDURE — 4040F PNEUMOC VAC/ADMIN/RCVD: CPT | Performed by: NURSE PRACTITIONER

## 2021-11-17 PROCEDURE — 71046 X-RAY EXAM CHEST 2 VIEWS: CPT

## 2021-11-17 PROCEDURE — 4004F PT TOBACCO SCREEN RCVD TLK: CPT | Performed by: NURSE PRACTITIONER

## 2021-11-17 PROCEDURE — 81002 URINALYSIS NONAUTO W/O SCOPE: CPT | Performed by: NURSE PRACTITIONER

## 2021-11-17 RX ORDER — LIDOCAINE 50 MG/G
OINTMENT TOPICAL
Status: ON HOLD | COMMUNITY
Start: 2021-11-02 | End: 2022-04-11 | Stop reason: HOSPADM

## 2021-11-17 NOTE — PROGRESS NOTES
Chief Complaint:   Cough (Believes to be med related, started over a month ago) and Flank Pain (Right Side Pain)      History of Present Illness   Source of history provided by:  patient. Belinda Grimm is a 78 y.o. old male with a past medical history of:   Past Medical History:   Diagnosis Date    Arthritis     Back pain 2005    injured back at work. on disability, CHRONIC , HERNIATED LUMBAR, H/O EPIDURAL INJECTIONS    Cerebral artery occlusion with cerebral infarction ([address]     6-24-18 - trouble talking, stutters right hand weak     Depression     pt states he s doing well at this time. off his meds    Diabetes mellitus ([address]     Hx of blood clots     Hyperlipidemia     controlled     PFO (patent foramen ovale)     S/P patent foramen ovale closure     Sleep apnea     ordered cpap, but pt does not use it    Testicular mass     LEFT, removed surgically 2015     presents to the [facility] care with a cough for the past 30 days. He reports that when he takes his eliquis and his metformin together. States the cough is non productive without sputum. Subjective fever noted. States they have a sore throat, mild HA, ear discomfort, and therapy at home has not been successful in alleviating symptoms. Denies any N/V/D, abdominal pain, CP, progressive SOB, dizziness, or lethargy. ROS    Unless otherwise stated in this report or unable to obtain because of the patient's clinical or mental status as evidenced by the medical record, this patients's positive and negative responses for Review of Systems, constitutional, psych, eyes, ENT, cardiovascular, respiratory, gastrointestinal, neurological, genitourinary, musculoskeletal, integument systems and systems related to the presenting problem are either stated in the preceding or were not pertinent or were negative for the symptoms and/or complaints related to the medical problem.     Past Surgical History:  has a past surgical history that includes knee surgery (1981); Achilles tendon surgery (1975); Tonsillectomy (1950s); Colonoscopy (1990s); Hydrocele surgery (5/1/2002); Hydrocele surgery (10/24/2012); Testicle removal (6/19/2013); other surgical history (6/19/2013); Nerve Block (2/24/14); Nerve Block (3/10/14); Colonoscopy (7/28/2015); eye surgery; transesophageal echocardiogram (10/01/2018); other surgical history (02/14/2019); and Tooth Extraction. Social History:  reports that he has been smoking cigarettes and cigars. He started smoking about 17 years ago. He has a 5.60 pack-year smoking history. He has never used smokeless tobacco. He reports current alcohol use. He reports previous drug use. Family History: family history includes Diabetes in his father and maternal grandmother. Allergies: Toradol [ketorolac tromethamine]    Physical Exam         VS:  BP (!) 141/84   Pulse 75   Temp 97.3 °F (36.3 °C) (Temporal)   Ht 6' (1.829 m)   Wt (!) 346 lb (156.9 kg)   SpO2 97%   BMI 46.93 kg/m²    Oxygen Saturation Interpretation: Normal.    Constitutional:  Alert, development consistent with age. Ears:  External Ears: Normal bilateral pinna. TM's & External Canals: TM's normal bilaterally without perforation. Canals without erythema or drainage. Nose:   There is no obvious septal defect. Mouth:  Moist bucca mucosa and normal tongue. Throat: Mild posterior pharyngeal erythema without exudates or lesions. Neck:  Supple. There is no obvious adenopathy or neck tenderness. Lungs:   Breath sounds: Normal chest expansion and breath sounds noted throughout. Denies wheezes, rales, or rhonchi noted. Heart:  Regular rate and rhythm, normal heart sounds, without pathological murmurs, ectopy, gallops, or rubs. Abdomen:  Soft, nontender, good bowel sounds. No firm or pulsatile mass. Skin:  Normal turgor. Warm, dry, without visible rash. Neurological:  Oriented. Motor functions intact.        Lab / Imaging Results   (All laboratory and radiology results have been personally reviewed by myself)  Labs:  Results for orders placed or performed in visit on 11/17/21   POCT Urinalysis no Micro   Result Value Ref Range    Color, UA yellow     Clarity, UA clear     Glucose, UA POC neg     Bilirubin, UA neg     Ketones, UA neg     Spec Grav, UA >=1.030     Blood, UA POC neg     pH, UA 5.5     Protein, UA POC neg     Urobilinogen, UA 0.2     Leukocytes, UA neg     Nitrite, UA neg     Appearance, Fluid Clear Clear, Slightly Cloudy       Imaging: All Radiology results interpreted by Radiologist unless otherwise noted. No orders to display         Assessment / Plan   1. Cough  Will call patient with results    - XR CHEST (2 VW); Future    2. Flank pain    - POCT Urinalysis no Micro        Pt advised to f/u with PCP in 7-10 days for recheck. ER if changes or worse. Advised to take all medications as directed.      Divina Hui, [facility] Nurse Practitioner

## 2021-12-07 ENCOUNTER — TELEPHONE (OUTPATIENT)
Dept: INTERNAL MEDICINE | Age: 71
End: 2021-12-07

## 2021-12-07 RX ORDER — APIXABAN 5 MG/1
TABLET, FILM COATED ORAL
Qty: 60 TABLET | Refills: 0 | Status: SHIPPED
Start: 2021-12-07 | End: 2022-02-01 | Stop reason: SDUPTHER

## 2021-12-07 NOTE — TELEPHONE ENCOUNTER
----- Message from Dave Mountrail sent at 12/7/2021 10:08 AM EST -----  Subject: Message to Provider    QUESTIONS  Information for Provider? pt would like to know if he has a    with the office that can give him a phone call to help him with some   things. pt also would like a call about his meds and the concerns he has   because the meds make him cough. Pt also like to know if he should stop   taking his Eliquis.   ---------------------------------------------------------------------------  --------------  CALL BACK INFO  What is the best way for the office to contact you? Do not leave any   message, patient will call back for answer  Preferred Call Back Phone Number? [phone]  ---------------------------------------------------------------------------  --------------  SCRIPT ANSWERS  Relationship to Patient?  Self

## 2021-12-07 NOTE — TELEPHONE ENCOUNTER
Spoke with patient, states he wants to stop eliquis and lisinopril. Patient instructed not to stop any medication unless instructed by physician. Patient states needs to come in and get medications reviewed. Patient also wants to speak with Social service.   Patient scheduled for appointment tomorrow

## 2021-12-08 ENCOUNTER — HOSPITAL ENCOUNTER (OUTPATIENT)
Dept: GENERAL RADIOLOGY | Age: 71
Discharge: HOME | End: 2021-12-10
Payer: MEDICARE

## 2021-12-08 ENCOUNTER — HOSPITAL ENCOUNTER (OUTPATIENT)
Age: 71
Discharge: HOME | End: 2021-12-08
Payer: MEDICARE

## 2021-12-08 ENCOUNTER — SOCIAL WORK (OUTPATIENT)
Dept: INTERNAL MEDICINE | Age: 71
End: 2021-12-08

## 2021-12-08 ENCOUNTER — OFFICE VISIT (OUTPATIENT)
Dept: INTERNAL MEDICINE | Age: 71
End: 2021-12-08
Payer: MEDICARE

## 2021-12-08 VITALS
RESPIRATION RATE: 22 BRPM | HEIGHT: 72 IN | BODY MASS INDEX: 42.66 KG/M2 | SYSTOLIC BLOOD PRESSURE: 136 MMHG | WEIGHT: 315 LBS | HEART RATE: 92 BPM | TEMPERATURE: 97.9 F | OXYGEN SATURATION: 95 % | DIASTOLIC BLOOD PRESSURE: 89 MMHG

## 2021-12-08 DIAGNOSIS — E11.9 DIABETIC EYE EXAM (HCC): ICD-10-CM
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
## 2021-12-08 DIAGNOSIS — Z12.5 ENCOUNTER FOR SCREENING FOR MALIGNANT NEOPLASM OF PROSTATE: ICD-10-CM

## 2021-12-08 DIAGNOSIS — E61.1 IRON DEFICIENCY: ICD-10-CM

## 2021-12-08 DIAGNOSIS — R33.8 BENIGN PROSTATIC HYPERPLASIA WITH URINARY RETENTION: ICD-10-CM

## 2021-12-08 DIAGNOSIS — N40.1 BENIGN PROSTATIC HYPERPLASIA WITH URINARY RETENTION: ICD-10-CM

## 2021-12-08 DIAGNOSIS — E11.69 DIABETES MELLITUS TYPE 2 IN OBESE (HCC): Primary | ICD-10-CM

## 2021-12-08 DIAGNOSIS — H53.8 BLURRY VISION: ICD-10-CM

## 2021-12-08 DIAGNOSIS — E78.5 HYPERLIPIDEMIA, UNSPECIFIED HYPERLIPIDEMIA TYPE: ICD-10-CM

## 2021-12-08 DIAGNOSIS — Q21.12 PFO (PATENT FORAMEN OVALE): ICD-10-CM

## 2021-12-08 DIAGNOSIS — Z01.00 DIABETIC EYE EXAM (HCC): ICD-10-CM

## 2021-12-08 DIAGNOSIS — I10 ESSENTIAL HYPERTENSION: ICD-10-CM

## 2021-12-08 DIAGNOSIS — R41.3 MEMORY DEFICIT: ICD-10-CM

## 2021-12-08 DIAGNOSIS — E66.9 DIABETES MELLITUS TYPE 2 IN OBESE (HCC): Primary | ICD-10-CM

## 2021-12-08 DIAGNOSIS — G45.9 TIA (TRANSIENT ISCHEMIC ATTACK): ICD-10-CM
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
## 2021-12-08 DIAGNOSIS — R53.83 FATIGUE, UNSPECIFIED TYPE: ICD-10-CM

## 2021-12-08 DIAGNOSIS — I48.20 CHRONIC ATRIAL FIBRILLATION (HCC): ICD-10-CM

## 2021-12-08 LAB
ANION GAP SERPL CALCULATED.3IONS-SCNC: 17 MMOL/L (ref 7–16)
BASOPHILS ABSOLUTE: 0.04 E9/L (ref 0–0.2)
BASOPHILS RELATIVE PERCENT: 0.5 % (ref 0–2)
BUN BLDV-MCNC: 16 MG/DL (ref 6–23)
CALCIUM SERPL-MCNC: 9.5 MG/DL (ref 8.6–10.2)
CHLORIDE BLD-SCNC: 102 MMOL/L (ref 98–107)
CO2: 20 MMOL/L (ref 22–29)
CREAT SERPL-MCNC: 1.2 MG/DL (ref 0.7–1.2)
EOSINOPHILS ABSOLUTE: 0.06 E9/L (ref 0.05–0.5)
EOSINOPHILS RELATIVE PERCENT: 0.8 % (ref 0–6)
FERRITIN: 311 NG/ML
FOLATE: 8.4 NG/ML (ref 4.8–24.2)
GFR AFRICAN AMERICAN: >60
GFR NON-AFRICAN AMERICAN: >60 ML/MIN/1.73
GLUCOSE BLD-MCNC: 162 MG/DL (ref 74–99)
HBA1C MFR BLD: 6.8 %
HCT VFR BLD CALC: 40.6 % (ref 37–54)
HEMOGLOBIN: 12.9 G/DL (ref 12.5–16.5)
IMMATURE GRANULOCYTES #: 0.04 E9/L
IMMATURE GRANULOCYTES %: 0.5 % (ref 0–5)
IRON SATURATION: 17 % (ref 20–55)
IRON: 42 MCG/DL (ref 59–158)
LYMPHOCYTES ABSOLUTE: 2.63 E9/L (ref 1.5–4)
LYMPHOCYTES RELATIVE PERCENT: 34 % (ref 20–42)
MAGNESIUM: 1.8 MG/DL (ref 1.6–2.6)
MCH RBC QN AUTO: 29.3 PG (ref 26–35)
MCHC RBC AUTO-ENTMCNC: 31.8 % (ref 32–34.5)
MCV RBC AUTO: 92.3 FL (ref 80–99.9)
MONOCYTES ABSOLUTE: 0.66 E9/L (ref 0.1–0.95)
MONOCYTES RELATIVE PERCENT: 8.5 % (ref 2–12)
NEUTROPHILS ABSOLUTE: 4.31 E9/L (ref 1.8–7.3)
NEUTROPHILS RELATIVE PERCENT: 55.7 % (ref 43–80)
PDW BLD-RTO: 12.4 FL (ref 11.5–15)
PHOSPHORUS: 3.7 MG/DL (ref 2.5–4.5)
PLATELET # BLD: 417 E9/L (ref 130–450)
PMV BLD AUTO: 8.7 FL (ref 7–12)
POTASSIUM SERPL-SCNC: 4.7 MMOL/L (ref 3.5–5)
PROSTATE SPECIFIC ANTIGEN: 0.32 NG/ML (ref 0–4)
RBC # BLD: 4.4 E12/L (ref 3.8–5.8)
SODIUM BLD-SCNC: 139 MMOL/L (ref 132–146)
TOTAL IRON BINDING CAPACITY: 253 MCG/DL (ref 250–450)
TSH SERPL DL<=0.05 MIU/L-ACNC: 3.89 UIU/ML (ref 0.27–4.2)
VITAMIN B-12: 385 PG/ML (ref 211–946)
VITAMIN D 25-HYDROXY: 18 NG/ML (ref 30–100)
WBC # BLD: 7.7 E9/L (ref 4.5–11.5)

## 2021-12-08 PROCEDURE — 3017F COLORECTAL CA SCREEN DOC REV: CPT | Performed by: INTERNAL MEDICINE

## 2021-12-08 PROCEDURE — 2022F DILAT RTA XM EVC RTNOPTHY: CPT | Performed by: INTERNAL MEDICINE

## 2021-12-08 PROCEDURE — 1123F ACP DISCUSS/DSCN MKR DOCD: CPT | Performed by: INTERNAL MEDICINE

## 2021-12-08 PROCEDURE — 83540 ASSAY OF IRON: CPT

## 2021-12-08 PROCEDURE — G8427 DOCREV CUR MEDS BY ELIG CLIN: HCPCS | Performed by: INTERNAL MEDICINE

## 2021-12-08 PROCEDURE — 4004F PT TOBACCO SCREEN RCVD TLK: CPT | Performed by: INTERNAL MEDICINE

## 2021-12-08 PROCEDURE — 99213 OFFICE O/P EST LOW 20 MIN: CPT | Performed by: INTERNAL MEDICINE

## 2021-12-08 PROCEDURE — G8417 CALC BMI ABV UP PARAM F/U: HCPCS | Performed by: INTERNAL MEDICINE

## 2021-12-08 PROCEDURE — 85025 COMPLETE CBC W/AUTO DIFF WBC: CPT

## 2021-12-08 PROCEDURE — 36415 COLL VENOUS BLD VENIPUNCTURE: CPT

## 2021-12-08 PROCEDURE — G8484 FLU IMMUNIZE NO ADMIN: HCPCS | Performed by: INTERNAL MEDICINE

## 2021-12-08 PROCEDURE — 83036 HEMOGLOBIN GLYCOSYLATED A1C: CPT | Performed by: INTERNAL MEDICINE

## 2021-12-08 PROCEDURE — 73562 X-RAY EXAM OF KNEE 3: CPT

## 2021-12-08 PROCEDURE — 82306 VITAMIN D 25 HYDROXY: CPT

## 2021-12-08 PROCEDURE — 4040F PNEUMOC VAC/ADMIN/RCVD: CPT | Performed by: INTERNAL MEDICINE

## 2021-12-08 PROCEDURE — 83735 ASSAY OF MAGNESIUM: CPT

## 2021-12-08 PROCEDURE — 82607 VITAMIN B-12: CPT

## 2021-12-08 PROCEDURE — 84100 ASSAY OF PHOSPHORUS: CPT

## 2021-12-08 PROCEDURE — 84443 ASSAY THYROID STIM HORMONE: CPT

## 2021-12-08 PROCEDURE — 82728 ASSAY OF FERRITIN: CPT

## 2021-12-08 PROCEDURE — 80048 BASIC METABOLIC PNL TOTAL CA: CPT

## 2021-12-08 PROCEDURE — 83550 IRON BINDING TEST: CPT

## 2021-12-08 PROCEDURE — 82746 ASSAY OF FOLIC ACID SERUM: CPT

## 2021-12-08 PROCEDURE — G0103 PSA SCREENING: HCPCS

## 2021-12-08 RX ORDER — ATORVASTATIN CALCIUM 40 MG/1
20 TABLET, FILM COATED ORAL NIGHTLY
Qty: 30 TABLET | Refills: 0 | Status: SHIPPED
Start: 2021-12-08 | End: 2022-02-02 | Stop reason: SDUPTHER

## 2021-12-08 RX ORDER — TAMSULOSIN HYDROCHLORIDE 0.4 MG/1
0.4 CAPSULE ORAL DAILY
Qty: 30 CAPSULE | Refills: 5 | Status: SHIPPED
Start: 2021-12-08 | End: 2022-06-30 | Stop reason: SDUPTHER

## 2021-12-08 RX ORDER — VALSARTAN 80 MG/1
40 TABLET ORAL DAILY
Qty: 45 TABLET | Refills: 1 | Status: CANCELLED | OUTPATIENT
Start: 2021-12-08

## 2021-12-08 RX ORDER — THIAMINE MONONITRATE (VIT B1) 100 MG
100 TABLET ORAL DAILY
Qty: 30 TABLET | Refills: 3 | Status: SHIPPED | OUTPATIENT
Start: 2021-12-08

## 2021-12-08 RX ORDER — TAMSULOSIN HCL 0.4 MG
0.4 CAPSULE ORAL DAILY
Qty: 30 CAPSULE | Refills: 3 | Status: CANCELLED
Start: 2021-12-08

## 2021-12-08 RX ORDER — DILTIAZEM HYDROCHLORIDE 120 MG/1
120 CAPSULE, COATED, EXTENDED RELEASE ORAL DAILY
Qty: 30 CAPSULE | Refills: 5 | Status: SHIPPED
Start: 2021-12-08 | End: 2022-06-30 | Stop reason: SDUPTHER
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
## 2021-12-08 NOTE — PATIENT INSTRUCTIONS
· Follow-up in 1 month  · Please obtain x-rays of lower extremities today  · Please obtain lab work today  · You will be contacted for appointment with electrophysiology  · You be contacted for an appointment with ophthalmology  · Apply Voltaren gel as needed up to 2 times daily to both lower extremities.   Do not apply to open wounds  · Stop taking lisinopril 5 mg  · Start taking Cardizem 120 mg extended release daily  · Contact your neurologist [facility]   · [address]  · [phone]

## 2021-12-08 NOTE — PROGRESS NOTES
Patient has not knowingly had unprotected exposire to anyone positive for COVID-10 within the last 14 days DENIES    AND does not have the following signs or symptoms:    A. One of the followin. Fever greater than 100.0 F NEGATIVE       2. Cough POSITIVE       3. New onset shortness of breath NEGATIVE       4. New onset difficulty breathing NEGATIVE    AND/OR    B. Two or more of the following criteria:        1. Muscle aches NEGATIVE       2. Headache NEGATIVE       3. Sore Throat NEGATIVE       4. New onset loss of smell or taste NEGATIVE       5. New onset diarrhea NEGATIVE       6. Chills NEGATIVE       7. Runny nose NEGATIVE       8.  Sneezing NEGATIVE    *intermittant cough since on lisinopril and eliquis*

## 2021-12-09 ENCOUNTER — TELEPHONE (OUTPATIENT)
Dept: INTERNAL MEDICINE | Age: 71
End: 2021-12-09

## 2021-12-09 DIAGNOSIS — E87.29 INCREASED ANION GAP METABOLIC ACIDOSIS: Primary | ICD-10-CM

## 2021-12-09 NOTE — TELEPHONE ENCOUNTER
 Called patient to review Labs, Imaging and symptoms    Patient was identified at start of call    Vitamin D 50,000u for 12 weeks     Eleated AG  o Discussed no alcohol, anorexia, fever, Asprin, use ethanol, methanol and isopropol consumption   Will get BMP before next visit

## 2021-12-10 RX ORDER — ERGOCALCIFEROL 1.25 MG/1
50000 CAPSULE ORAL WEEKLY
Qty: 12 CAPSULE | Refills: 0 | Status: SHIPPED
Start: 2021-12-10 | End: 2022-02-09

## 2021-12-20 ENCOUNTER — TELEPHONE (OUTPATIENT)
Dept: INTERNAL MEDICINE | Age: 71
End: 2021-12-20
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
## 2021-12-20 NOTE — TELEPHONE ENCOUNTER
Pt has appt at [address] [place] [address] on 1.20.22 at 9:45Am; MA mailed letter and LSW left voice message today.   Will remind pt closer to appt time

## 2022-01-11 ENCOUNTER — TELEPHONE (OUTPATIENT)
Dept: INTERNAL MEDICINE | Age: 72
End: 2022-01-11

## 2022-01-11 NOTE — TELEPHONE ENCOUNTER
Called pt and advised that he does not need to fast for his lab work  He has an appt here tomorrow but advised that he should have it done today

## 2022-01-11 NOTE — TELEPHONE ENCOUNTER
----- Message from Manish Levy sent at 1/11/2022  9:52 AM EST -----  Subject: Message to Provider    QUESTIONS  Information for Provider? Patient wants to verify if he has to fast for   bloodwork before his appointment on 1/12/2022. Please advise patient asap.  ---------------------------------------------------------------------------  --------------  CALL BACK INFO  What is the best way for the office to contact you? OK to leave message on   voicemail  Preferred Call Back Phone Number? [phone]  ---------------------------------------------------------------------------  --------------  SCRIPT ANSWERS  Relationship to Patient?  Self

## 2022-01-13 ENCOUNTER — TELEPHONE (OUTPATIENT)
Dept: INTERNAL MEDICINE | Age: 72
End: 2022-01-13

## 2022-01-13 NOTE — TELEPHONE ENCOUNTER
Initiated call to pt; he cancelled his 1.12.22 appt. Pt reports there was a problem with his ride. Reviewed [facility] appt with pt and he stated he did not recall receiving information. Provided pt with details of both appts which he wrote down; asked if his SO was home and he agreed for LSw to review with her. Above info given to Antwan Chantell and advised LSW will request transport for IM appt and she will have her granddaughter bring pt to eye appt.   [address] requested for 2.9-22 IM appt    PAR conf # [id number] received 1.13.22; be ready time at 9:00AM and return home trip scheduled for noon

## 2022-02-01 DIAGNOSIS — E78.5 HYPERLIPIDEMIA, UNSPECIFIED HYPERLIPIDEMIA TYPE: ICD-10-CM
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
## 2022-02-02 RX ORDER — ATORVASTATIN CALCIUM 40 MG/1
20 TABLET, FILM COATED ORAL NIGHTLY
Qty: 30 TABLET | Refills: 0 | Status: SHIPPED
Start: 2022-02-02 | End: 2022-02-09 | Stop reason: SDUPTHER

## 2022-02-07 ENCOUNTER — HOSPITAL ENCOUNTER (OUTPATIENT)
Age: 72
Discharge: HOME | End: 2022-02-07
Payer: MEDICARE

## 2022-02-07 DIAGNOSIS — E87.29 INCREASED ANION GAP METABOLIC ACIDOSIS: ICD-10-CM

## 2022-02-07 LAB
ALBUMIN SERPL-MCNC: 4.1 G/DL (ref 3.5–5.2)
ALP BLD-CCNC: 81 U/L (ref 40–129)
ALT SERPL-CCNC: 12 U/L (ref 0–40)
ANION GAP SERPL CALCULATED.3IONS-SCNC: 15 MMOL/L (ref 7–16)
AST SERPL-CCNC: 12 U/L (ref 0–39)
BILIRUB SERPL-MCNC: 0.3 MG/DL (ref 0–1.2)
BILIRUBIN DIRECT: <0.2 MG/DL (ref 0–0.3)
BILIRUBIN, INDIRECT: NORMAL MG/DL (ref 0–1)
BUN BLDV-MCNC: 12 MG/DL (ref 6–23)
BUN BLDV-MCNC: 12 MG/DL (ref 6–23)
CALCIUM SERPL-MCNC: 9.1 MG/DL (ref 8.6–10.2)
CHLORIDE BLD-SCNC: 103 MMOL/L (ref 98–107)
CO2: 21 MMOL/L (ref 22–29)
CREAT SERPL-MCNC: 1.1 MG/DL (ref 0.7–1.2)
CREAT SERPL-MCNC: 1.1 MG/DL (ref 0.7–1.2)
GFR AFRICAN AMERICAN: >60
GFR AFRICAN AMERICAN: >60
GFR NON-AFRICAN AMERICAN: >60 ML/MIN/1.73
GFR NON-AFRICAN AMERICAN: >60 ML/MIN/1.73
GLUCOSE BLD-MCNC: 189 MG/DL (ref 74–99)
POTASSIUM SERPL-SCNC: 4.2 MMOL/L (ref 3.5–5)
SODIUM BLD-SCNC: 139 MMOL/L (ref 132–146)
TOTAL PROTEIN: 8 G/DL (ref 6.4–8.3)

## 2022-02-07 PROCEDURE — 80048 BASIC METABOLIC PNL TOTAL CA: CPT

## 2022-02-07 PROCEDURE — 36415 COLL VENOUS BLD VENIPUNCTURE: CPT

## 2022-02-07 PROCEDURE — 80076 HEPATIC FUNCTION PANEL: CPT

## 2022-02-07 PROCEDURE — 82565 ASSAY OF CREATININE: CPT

## 2022-02-07 PROCEDURE — 84520 ASSAY OF UREA NITROGEN: CPT

## 2022-02-07 ASSESSMENT — ENCOUNTER SYMPTOMS
SHORTNESS OF BREATH: 0
ABDOMINAL PAIN: 0
CONSTIPATION: 0
CHEST TIGHTNESS: 0
COUGH: 0
DIARRHEA: 0
NAUSEA: 0

## 2022-02-07 NOTE — PROGRESS NOTES
[facility] Internal Medicine      SUBJECTIVE:  Iftikhar Mike (:  1950) is a 73 y.o. male here for evaluation of the following chief complaint(s):  1 Month Follow-Up and Diabetes (bs 183 this am)    Last seen on 2021    Hypertension  · BP today 131/67  · Previously stopped on lisinopril due to cough  · On Cardizem 120 mg extended release daily    History of A. fib  · Asymptomatic  · On Eliquis 5 mg twice daily  · On Cardizem as above  · Referred to EP during last visit; appointment on 3/4/2022    NIDDM 2  · HbA1c 6.8 on 2021  · Blood sugar average 140-160s in the morning prior to breakfast; goes as high as 180s if he eats candies  · On Metformin 500 mg twice daily  · Saw eye doctor few days ago > cataracts, no DM changes, 2022  · Will see his podiatrist in a few days, follows up every 2 to 3 months further recommendations    History of frontal ischemic stroke on 2018  · Stable  · On aspirin and Lipitor 20 mg daily  · Told to follow-up with neurology during last visit    Generalized fatigue  · BMP, TSH, CBC unremarkable from 2021  · Vitamin D low at 20 > start 800-1000 IU daily    History of BPH   · On Flomax 0.4 mg daily    History bilateral knee pain 2/2 osteoarthritis  · Bilateral knee x-ray on 2021 showed advanced tricompartment osteoarthritis with no acute findings  · On Voltaren gel as needed    Healthcare maintenance  · Needs Tdap, shingles, flu vaccines  · Needs diabetic eye/foot exams  · Needs annual wellness visit    Review of Systems   Constitutional: Negative for appetite change, chills and fever. Respiratory: Negative for cough, chest tightness and shortness of breath. Cardiovascular: Negative for chest pain, palpitations and leg swelling. Gastrointestinal: Negative for abdominal pain, constipation, diarrhea and nausea. Genitourinary: Negative. Musculoskeletal: Negative. Neurological: Negative for headaches. HAS-BLED Score                            Risk Factors      Points   Hypertension  Uncontrolled, >968 mmHg systolic   No=0   Renal disease  Dialysis, transplant, Cr>2.26 mg/dL or >200 µmol/L   No=0   Liver disease   Cirrhosis or bilirubin >2x normal with AST/ALT/AP >3x normal   No=0   Stroke history   Yes=1   Prior major bleeding or predisposition to bleeding     No=0   Labile  INR  Unstable/high INRs, time in therapeutic range <60%   No=0   Age >71   Yes=1   Medication usage predisposing to bleeding   Aspirin, clopidogrel, NSAIDs   Yes=1   Alcohol use   >7 drinks/week   No=0             HAS-BLED Score:    3:  Risk was 5.8% in one validation study and 3.72 bleeds per 100 patient-years in another validation study. Current Outpatient Medications on File Prior to Visit   Medication Sig Dispense Refill    tamsulosin (FLOMAX) 0.4 MG capsule Take 1 capsule by mouth daily 30 capsule 5    dilTIAZem (CARDIZEM CD) 120 MG extended release capsule Take 1 capsule by mouth daily 30 capsule 5    diclofenac sodium (VOLTAREN) 1 % GEL Apply topically 2 times daily Apply to knees. Avoid open wounds. 350 g 1    vitamin B-1 (THIAMINE) 100 MG tablet Take 1 tablet by mouth daily 30 tablet 3    blood glucose monitor kit and supplies Test 2 times a day before breakfast and supper 1 kit 0    oxyCODONE (ROXICODONE) 5 MG immediate release tablet Take 5 mg by mouth every 6 hours as needed. [facility]      lidocaine (XYLOCAINE) 5 % ointment       blood glucose monitor strips 2 times daily.  100 strip 1    dicyclomine (BENTYL) 10 MG capsule Take 1 capsule by mouth every 6 hours as needed (Bowel spasms) 20 capsule 0     Current Facility-Administered Medications on File Prior to Visit   Medication Dose Route Frequency Provider Last Rate Last Admin    polyethylene glycol (GLYCOLAX) packet 17 g  17 g Oral Daily PRN Maricruz Rivera MD        promethazine (PHENERGAN) tablet 12.5 mg  12.5 mg Oral Q6H PRN Seth Paulson Sloane Adkins MD        Or    ondansetron [facility]) injection 4 mg  4 mg IntraVENous Q6H PRN Nicole Shultz MD        atorvastatin (LIPITOR) tablet 80 mg  80 mg Oral Nightly Nicole Shultz MD        aspirin EC tablet 81 mg  81 mg Oral Daily Nicole Shultz MD        Or   [place] aspirin suppository 300 mg  300 mg Rectal Daily Nicole Shultz MD        sodium chloride flush 0.9 % injection 10 mL  10 mL IntraVENous 2 times per day Nicole Shultz MD        sodium chloride flush 0.9 % injection 10 mL  10 mL IntraVENous PRN Nicole Shultz MD        acetaminophen (TYLENOL) tablet 650 mg  650 mg Oral Q4H PRN Nicole Shultz MD           OBJECTIVE:    VS:   Vitals:    02/09/22 0914   BP: 131/67   Site: Left Upper Arm   Position: Sitting   Cuff Size: Large Adult   Pulse: 69   Resp: 20   Temp: 96.6 °F (35.9 °C)   TempSrc: Temporal   SpO2: 98%   Weight: (!) 356 lb 14.4 oz (161.9 kg)   Height: 6' 2\" (1.88 m)     Physical Exam  Constitutional:       Appearance: Normal appearance. He is not toxic-appearing. HENT:      Head: Normocephalic and atraumatic. Eyes:      Pupils: Pupils are equal, round, and reactive to light. Cardiovascular:      Rate and Rhythm: Normal rate and regular rhythm. Pulses: Normal pulses. Heart sounds: Normal heart sounds. Pulmonary:      Effort: Pulmonary effort is normal.      Breath sounds: Normal breath sounds. No wheezing or rhonchi. Abdominal:      General: Abdomen is flat. Bowel sounds are normal.      Palpations: Abdomen is soft. Tenderness: There is no abdominal tenderness. Musculoskeletal:      Cervical back: Neck supple. Skin:     General: Skin is warm and dry. Neurological:      General: No focal deficit present. Mental Status: He is alert and oriented to person, place, and time. Psychiatric:         Mood and Affect: Mood normal.         Behavior: Behavior normal.         Thought Content:  Thought content normal.         Judgment: Judgment normal. ASSESSMENT/PLAN:    Hypertension  · Controlled  · Continue Cardizem 120 mg extended release daily    History of A. fib  · Continue Eliquis 5 mg twice daily  · Continue Cardizem as above  · Referred to EP during last visit; appointment on 3/4/2022  · Advised to stop Eliquis 5 days prior to her back procedure and to resume the day after    NIDDM 2  · Continue Metformin 500 mg twice daily  · Will need HbA1c recheck at next visit    History of frontal ischemic stroke on 6/24/2018  · Continue Lipitor 20 mg daily  · Stop aspirin    Vitamin D deficiency  · Vitamin D low at 18  · Start vitamin D 1000 units daily    History of BPH   · Continue Flomax 0.4 mg daily    History bilateral knee pain 2/2 osteoarthritis  · Continue Voltaren gel as needed    Healthcare maintenance  · Needs Tdap, shingles, flu vaccines  · Needs annual wellness visit  · We will discuss these at next visit    RTC:  Return in about 4 months (around 6/9/2022). I have reviewed my findings and recommendations with Tom Weathers and Dr. Anabell Trinh.     Leida Saldana MD   2/9/2022 10:36 AM Home

## 2022-02-09 ENCOUNTER — OFFICE VISIT (OUTPATIENT)
Dept: INTERNAL MEDICINE | Age: 72
End: 2022-02-09
Payer: MEDICARE

## 2022-02-09 ENCOUNTER — TELEPHONE (OUTPATIENT)
Dept: INTERNAL MEDICINE | Age: 72
End: 2022-02-09

## 2022-02-09 VITALS
OXYGEN SATURATION: 98 % | HEART RATE: 69 BPM | BODY MASS INDEX: 40.43 KG/M2 | RESPIRATION RATE: 20 BRPM | DIASTOLIC BLOOD PRESSURE: 67 MMHG | HEIGHT: 74 IN | TEMPERATURE: 96.6 F | SYSTOLIC BLOOD PRESSURE: 131 MMHG | WEIGHT: 315 LBS

## 2022-02-09 DIAGNOSIS — G45.9 TIA (TRANSIENT ISCHEMIC ATTACK): ICD-10-CM

## 2022-02-09 DIAGNOSIS — N52.9 ERECTILE DYSFUNCTION, UNSPECIFIED ERECTILE DYSFUNCTION TYPE: ICD-10-CM

## 2022-02-09 DIAGNOSIS — E56.9 VITAMIN DEFICIENCY: ICD-10-CM

## 2022-02-09 DIAGNOSIS — E78.5 HYPERLIPIDEMIA, UNSPECIFIED HYPERLIPIDEMIA TYPE: ICD-10-CM
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
## 2022-02-09 DIAGNOSIS — E66.9 DIABETES MELLITUS TYPE 2 IN OBESE (HCC): ICD-10-CM

## 2022-02-09 DIAGNOSIS — E11.69 DIABETES MELLITUS TYPE 2 IN OBESE (HCC): ICD-10-CM

## 2022-02-09 DIAGNOSIS — I48.20 CHRONIC ATRIAL FIBRILLATION (HCC): Primary | ICD-10-CM

## 2022-02-09 PROCEDURE — 1123F ACP DISCUSS/DSCN MKR DOCD: CPT | Performed by: INTERNAL MEDICINE

## 2022-02-09 PROCEDURE — 99213 OFFICE O/P EST LOW 20 MIN: CPT | Performed by: INTERNAL MEDICINE

## 2022-02-09 PROCEDURE — 99212 OFFICE O/P EST SF 10 MIN: CPT | Performed by: INTERNAL MEDICINE

## 2022-02-09 PROCEDURE — 2022F DILAT RTA XM EVC RTNOPTHY: CPT | Performed by: INTERNAL MEDICINE

## 2022-02-09 PROCEDURE — G8484 FLU IMMUNIZE NO ADMIN: HCPCS | Performed by: INTERNAL MEDICINE

## 2022-02-09 PROCEDURE — 3017F COLORECTAL CA SCREEN DOC REV: CPT | Performed by: INTERNAL MEDICINE

## 2022-02-09 PROCEDURE — G8428 CUR MEDS NOT DOCUMENT: HCPCS | Performed by: INTERNAL MEDICINE

## 2022-02-09 PROCEDURE — G8417 CALC BMI ABV UP PARAM F/U: HCPCS | Performed by: INTERNAL MEDICINE

## 2022-02-09 PROCEDURE — 3046F HEMOGLOBIN A1C LEVEL >9.0%: CPT | Performed by: INTERNAL MEDICINE

## 2022-02-09 PROCEDURE — 4040F PNEUMOC VAC/ADMIN/RCVD: CPT | Performed by: INTERNAL MEDICINE

## 2022-02-09 PROCEDURE — 4004F PT TOBACCO SCREEN RCVD TLK: CPT | Performed by: INTERNAL MEDICINE

## 2022-02-09 RX ORDER — SILDENAFIL 50 MG/1
50 TABLET, FILM COATED ORAL DAILY PRN
Qty: 15 TABLET | Refills: 0 | Status: SHIPPED
Start: 2022-02-09 | End: 2022-03-04

## 2022-02-09 RX ORDER — MECLIZINE HYDROCHLORIDE 25 MG/1
25 TABLET ORAL 2 TIMES DAILY PRN
Qty: 60 TABLET | Refills: 0 | Status: SHIPPED | OUTPATIENT
Start: 2022-02-09

## 2022-02-09 RX ORDER — MELATONIN
1000 DAILY
Qty: 90 TABLET | Refills: 1 | Status: SHIPPED
Start: 2022-02-09 | End: 2022-03-29

## 2022-02-09 RX ORDER — ATORVASTATIN CALCIUM 40 MG/1
20 TABLET, FILM COATED ORAL NIGHTLY
Qty: 45 TABLET | Refills: 1 | Status: SHIPPED
Start: 2022-02-09 | End: 2022-06-30 | Stop reason: SDUPTHER

## 2022-02-09 NOTE — PROGRESS NOTES
[facility]  Internal Medicine Residency Clinic    Attending Physician Statement  I have discussed the case, including pertinent history and exam findings with the resident physician. I have seen and examined the patient and the key elements of the encounter have been performed by me. I agree with the assessment, plan and orders as documented by the resident. I have reviewed all pertinent PMHx, PSHx, FamHx, SocialHx, medications, and allergies and updated history as appropriate. Patient presents for routine follow up of medical problems. HTN -- controlled on current meds    ACEi-induced cough -- improved with switch to cardizem last visit    DM 2, controlled with metformin -- A1c < 7%. Hx of emobilic stroke s/p PFO closure -- ok to discontinue ASA as he is on full dose Eliquis for AF (HAS-BLED score 3 -- high risk)     Remainder of medical problems as per resident note.     Magy Gant DO  2/9/2022 10:13 AM

## 2022-02-09 NOTE — TELEPHONE ENCOUNTER
LSW unable to meet in person during 2.9.22 IM visit; therefore follow up call initiated. Pt very happy with appointment and coordination of services regarding planning for procedure at [facility]; states he completed appt at [facility] but then forgot he attended when he called to verify date and time; pt did state he received LSW reminder thru his SO. Pt unsure when return is, therefore LSW called and verified and appt is 5.19.22 at 10:00AM; IM Dr schedule not available for RTC 4 months; therefore placed on LSW schedule to check and pt requests [id number] [facility] transport. Encouraged to reach out to [place] as needed.   LSW will remind pt of appts to help with adherence

## 2022-02-09 NOTE — PATIENT INSTRUCTIONS
Please stop Eliquis 5 days before your procedure and resume the day after  Stop aspirin  Continue the remainder of your medications as prescribed, no changes made  Please call clinic in 4 months to schedule follow-up appointment

## 2022-02-09 NOTE — PROGRESS NOTES
Patient given instruction by Dr Pravin Hay. 4 month follow up will be scheduled. Printed AVS given to patient.

## 2022-03-03 NOTE — PROGRESS NOTES
[facility] and [facility] Electrophysiology  Consultation Report  PATIENT: Leslee Mendiola  MEDICAL RECORD NUMBER: [id number]  DATE OF SERVICE:  3/4/2022  ATTENDING ELECTROPHYSIOLOGIST: Dhiraj Sorenson MD  REFERRING PHYSICIAN: Fe Asencio MD and Peewee Arce MD  CHIEF COMPLAINT: Paroxysmal atrial fibrillation    HPI: This is a 73 y.o. male with a history of   Patient Active Problem List   Diagnosis    Diabetes mellitus type 2 in obese ([address]    Metabolic syndrome    Morbid obesity ([address]    Obstructive sleep apnea    Essential hypertension    PFO (patent foramen ovale)    [facility]schemic [facility]    Erectile dysfunction    Hx of central retinal artery occlusion    Stuttering    TIA (transient ischemic attack)    Class 3 severe obesity due to excess calories with serious comorbidity and body mass index (BMI) of 45.0 to 49.9 in [facility])    Status post patent foramen ovale closure    Chronic atrial fibrillation ([facility]   who presents to cardiac electrophysiology clinic for consultation of paroxysmal atrial fibrillation. The patient has history of paroxysmal atrial fibrillation, hypertension, diabetes mellitus, left frontal lobe CVA, PFO  status post closure 2/14/19, obesity, LATISHA and BPH. The patient was diagnosed with AF on event monitor in July 2020 and was started on Eliquis. He presents today in [facility]. He is currently taking Cardizem for rate control. The patient reports feeling overall well since his Lisinopril was stopped and his coughing subsided and so did his palpitations. The patient denies any chest pain, dyspnea, palpitations, dizziness, syncope, orthopnea or paroxysmal nocturnal dyspnea. Explained in detail about the importance of [address] for stroke prophylaxis. He reports he is scheduled for a procedure for his back in the near future.     Patient Active Problem List    Diagnosis Date Noted    Chronic atrial fibrillation ([facility] 02/09/2022  TIA (transient ischemic attack) 05/07/2020    Class 3 severe obesity due to excess calories with serious comorbidity and body mass index (BMI) of 45.0 to 49.9 in adult [facility])     Status post patent foramen ovale closure     Hx of central retinal artery occlusion 09/10/2019    Stuttering 09/10/2019    Erectile dysfunction 02/05/2019    Ischemic stroke of frontal lobe ([address] 11/21/2018    PFO (patent foramen ovale) 10/22/2018    Essential hypertension 01/12/2016    Obstructive sleep apnea 07/16/2015    Diabetes mellitus type 2 in obese ([address] 67/60/4387    Metabolic syndrome 77/48/9124    Morbid obesity ([address] 12/15/2010       Past Medical History:   Diagnosis Date    Arthritis     Back pain 2005    injured back at work. on disability, CHRONIC , HERNIATED LUMBAR, H/O EPIDURAL INJECTIONS    Cerebral artery occlusion with cerebral infarction ([address]     6-24-18 - trouble talking, stutters right hand weak     Depression     pt states he s doing well at this time. off his meds    Diabetes mellitus ([address]     Hx of blood clots     Hyperlipidemia     controlled     PFO (patent foramen ovale)     S/P patent foramen ovale closure     Sleep apnea     ordered cpap, but pt does not use it    Testicular mass     LEFT, removed surgically 2015        Family History   Problem Relation Age of Onset    Diabetes Father     Diabetes Maternal Grandmother        Social History     Tobacco Use    Smoking status: Current Every Day Smoker     Packs/day: 0.40     Years: 17.00     Pack years: 6.80     Types: Cigarettes, Cigars     Start date: 1/1/2004    Smokeless tobacco: Never Used    Tobacco comment:  smokes 4-5 cigarettes a day , cigars on occasion   Substance Use Topics    Alcohol use:  Yes     Alcohol/week: 0.0 standard drinks     Comment: rare has some whiskey at times       Current Outpatient Medications   Medication Sig Dispense Refill    oxyCODONE (OXYCONTIN) 60 MG T12A extended release tablet 1 tablet      oxyCODONE (OXYCONTIN) 80 MG extended release tablet 1 tablet      vitamin D3 (CHOLECALCIFEROL) 25 MCG (1000 UT) TABS tablet Take 1 tablet by mouth daily 90 tablet 1    apixaban (ELIQUIS) 5 MG TABS tablet Take 1 tablet by mouth 2 times daily 180 tablet 1    atorvastatin (LIPITOR) 40 MG tablet Take 0.5 tablets by mouth nightly 45 tablet 1    metFORMIN (GLUCOPHAGE) 500 MG tablet Take 1 tablet by mouth 2 times daily (with meals) 180 tablet 1    meclizine (ANTIVERT) 25 MG tablet Take 1 tablet by mouth 2 times daily as needed for Dizziness 60 tablet 0    tamsulosin (FLOMAX) 0.4 MG capsule Take 1 capsule by mouth daily 30 capsule 5    dilTIAZem (CARDIZEM CD) 120 MG extended release capsule Take 1 capsule by mouth daily 30 capsule 5    diclofenac sodium (VOLTAREN) 1 % GEL Apply topically 2 times daily Apply to knees. Avoid open wounds. 350 g 1    vitamin B-1 (THIAMINE) 100 MG tablet Take 1 tablet by mouth daily 30 tablet 3    lidocaine (XYLOCAINE) 5 % ointment       oxyCODONE (ROXICODONE) 5 MG immediate release tablet Take 5 mg by mouth every 6 hours as needed. [facility]      blood glucose monitor strips 2 times daily. 100 strip 1    dicyclomine (BENTYL) 10 MG capsule Take 1 capsule by mouth every 6 hours as needed (Bowel spasms) 20 capsule 0    blood glucose monitor kit and supplies Test 2 times a day before breakfast and supper 1 kit 0     No current facility-administered medications for this visit.      Facility-Administered Medications Ordered in Other Visits   Medication Dose Route Frequency Provider Last Rate Last Admin    polyethylene glycol (GLYCOLAX) packet 17 g  17 g Oral Daily PRN Lorin Cheek MD        [facility]) tablet 12.5 mg  12.5 mg Oral Q6H PRN Lorin Cheek MD        Or    ondansetron [facility]) injection 4 mg  4 mg IntraVENous Q6H PRN Lorin Cheek MD        atorvastatin (LIPITOR) tablet 80 mg  80 mg Oral Nightly Lorin Cheek MD        aspirin EC tablet 81 mg 81 mg Oral Daily Kimmy Lazo MD        Or    aspirin suppository 300 mg  300 mg Rectal Daily Kimmy Lazo MD        sodium chloride flush 0.9 % injection 10 mL  10 mL IntraVENous 2 times per day Kimmy Lazo MD        sodium chloride flush 0.9 % injection 10 mL  10 mL IntraVENous PRN Kimmy Lazo MD        acetaminophen (TYLENOL) tablet 650 mg  650 mg Oral Q4H PRN Kimmy Lazo MD            Allergies   Allergen Reactions    Toradol [Ketorolac Tromethamine] Other (See Comments)     hallucination       ROS:   Constitutional: Negative for fever, activity change and appetite change. HENT: Negative for epistaxis. Eyes: Negative for diploplia, blurred vision. Respiratory: Negative for cough, chest tightness, shortness of breath and wheezing. Cardiovascular: pertinent positives in HPI  Gastrointestinal: Negative for abdominal pain and blood in stool. All other review of systems are negative     PHYSICAL EXAM:   Vitals:    03/04/22 1253   BP: 122/70   Pulse: 69   Resp: 18   Weight: (!) 356 lb 3.2 oz (161.6 kg)   Height: 6' 2\" (1.88 m)      Constitutional: Well-developed, no acute distress  Eyes: conjunctivae normal, no xanthelasma   Ears, Nose, Throat: oral mucosa moist, no cyanosis   CV: no JVD. Regular rate and rhythm. Normal S1S2 and no S3. No murmurs, rubs, or gallops. PMI is nondisplaced  Lungs: clear to auscultation bilaterally, normal respiratory effort without used of accessory muscles  Abdomen: soft, non-tender, bowel sounds present, no masses or hepatomegaly   Musculoskeletal: no digital clubbing, no edema   Skin: warm, no rashes     I have personally reviewed the laboratory, cardiac diagnostic and radiographic testing as outlined below:    Data:    No results for input(s): WBC, HGB, HCT, PLT in the last 72 hours. No results for input(s): NA, K, CL, CO2, BUN, CREATININE, GLU, CALCIUM in the last 72 hours.     Invalid input(s): MAGNESIUM   Lab Results   Component Value Date    MG 1.8 12/08/2021     No results for input(s): TSH in the last 72 hours. No results for input(s): INR in the last 72 hours. CXR 11/17/21:   FINDINGS:   The lungs are without acute focal process.  There is no effusion or   pneumothorax. The cardiomediastinal silhouette is without acute process. The   osseous structures are without acute process.           Impression   No acute process. EKG 03/04/22: Normal sinus rhythm 69 bpm, LAFB. Please see scan in Cardiology. Echocardiogram 5/8/20:   Findings      Left Ventricle   Normal left ventricular size and systolic function. Ejection fraction is visually estimated at 60-65%. Normal diastolic function. No regional wall motion abnormalities seen. Mild left ventricular concentric hypertrophy noted. Abnormal LV septal motion consistent with conduction disorder. Right Ventricle   Normal right ventricular size and function. Left Atrium   Normal sized left atrium. Agitated saline injected for shunt evaluation. No evidence of atrial level shunt. Right Atrium   Mildly enlarged right atrium. Mitral Valve   Structurally normal mitral valve. No evidence of mitral valve stenosis. Physiologic mitral regurgitation. Tricuspid Valve   The tricuspid valve appears structurally normal.   Physiologic and/or trace tricuspid regurgitation. RVSP is at least 34 mmHg. Aortic Valve   Structurally normal aortic valve. The aortic valve is trileaflet. No hemodynamically significant aortic stenosis is present. No evidence of aortic valve regurgitation. Pulmonic Valve   The pulmonic valve was not well visualized. No evidence of pulmonic valve stenosis. No evidence of any pulmonic regurgitation. Pericardial Effusion   No evidence of pericardial effusion. Aorta   Aortic root dimension within normal limits. Miscellaneous   Inferior Vena Cava not well visualized.       Conclusions      Summary   Normal left ventricular size and systolic function. Ejection fraction is visually estimated at 60-65%. Normal diastolic function. No regional wall motion abnormalities seen. Mild left ventricular concentric hypertrophy noted. Abnormal LV septal motion consistent with conduction disorder. Normal right ventricular size and function. Agitated saline injected for shunt evaluation. No evidence of atrial level shunt. Mildly enlarged right atrium. No significant valvular abnormalities. Signature      ----------------------------------------------------------------   Electronically signed by Dustin Hicks MD(Interpreting   physician) on 05/08/2020 05:18 PM   ----------------------------------------------------------------    Outpatient Monitor 8/10/20: I have independently reviewed all of the ECGs and rhythm strips per above     Assessment/Plan: This is a 73 y.o. male with a history of   Patient Active Problem List   Diagnosis    Diabetes mellitus type 2 in obese ([address]    Metabolic syndrome    Morbid obesity ([address]    Obstructive sleep apnea    Essential hypertension    PFO (patent foramen ovale)    Ischemic stroke of frontal lobe (HCC)    Erectile dysfunction    Hx of central retinal artery occlusion    Stuttering    TIA (transient ischemic attack)    Class 3 severe obesity due to excess calories with serious comorbidity and body mass index (BMI) of 45.0 to 49.9 in adult [facility])    Status post patent foramen ovale closure    Chronic atrial fibrillation ([address]    who presents with paroxysmal atrial fibrillation.     1. Paroxysmal atrial fibrillation  - Diagnosed on event monitor in July 2020.  - [id number] = 5 (HTN,TIA, Age,DM)  - Current OAC regiment includes Eliquis  - Current medical therapy includes Cardizem  - Thus far has had no cardioversion, AAD therapy and ablation.  - Had an extensive discussion regarding options between rate and rhythm control and the patient has chosen rate control.  - Presents in NSR.  - Had an extensive discussion regarding all secondary causes of AF which include but are not limited to the following and then need for lifestyle modifications and stringent control of contributing medical conditions which include            - Weight Loss: aggressive weight loss and regular moderate cardiopulmonary exercise to maintain BMI <27 with a loss of at least 10% of their current weight which is safely and adequately maintained            - Exercise: 30min 3-4x/week of at least moderate cardiopulmonary exercise up to 250 min/wk            - Blood pressure: target of <130/80mmHg            - Dyslipidemia: add a statin if LDL >100mg/dL            - Diabetes Mellitus: add Metformin if HbA1c >6.5%            - Obstructive sleep apnea: evaluate for and or treat with CPAP if AHI >30/hour            - Abstinence from alcohol and tobacco products    2. Hypertension  - Well controlled. - On Cardizem    3. Diabetes mellitus  - On Glucophage    4. Hyperlipidemia  - On Lipitor. 5. Left frontal lobe CVA    6. PFO   - Status post closure 2/14/19.    7. Obesity  Body mass index is 45.73 kg/m². 8. LATISHA   - Noncompliant to CPAP. 9. BPH  - On Flomax    Recommendations:  1. Obtain a 14 day Zio XT to determine AF burden. 2. Continue rate control treatment. 3. Continue Cardizem  mg daily. 4. Continue Eliquis 5 mg bid. 5. Life style modifications as above. 6. Follow up in 3 months or sooner PRN. Encouraged the patient to call the office for any questions or concerns. I have spent a total of 60 minutes with the patient and the family reviewing the above stated recommendations. And a total of >50% of that time involved face-to-face time providing counseling and or coordination of care with the other providers, preparation for the clinic visit, reviewing records/tests, counseling/education of the patient, ordering medications/tests/procedures, coordinating care, and documenting clinical information in the EHR. Thank you for allowing me to participate in your patient's care. Please call me if there are any questions or concerns.       Glynn Khan MD  Cardiac Electrophysiology  [facility]) Physicians  [facility] [place] [place] [facility]  1:27 PM  3/4/2022

## 2022-03-04 ENCOUNTER — OFFICE VISIT (OUTPATIENT)
Dept: NON INVASIVE DIAGNOSTICS | Age: 72
End: 2022-03-04
Payer: MEDICARE

## 2022-03-04 VITALS
RESPIRATION RATE: 18 BRPM | BODY MASS INDEX: 40.43 KG/M2 | HEIGHT: 74 IN | HEART RATE: 69 BPM | WEIGHT: 315 LBS | SYSTOLIC BLOOD PRESSURE: 122 MMHG | DIASTOLIC BLOOD PRESSURE: 70 MMHG

## 2022-03-04 DIAGNOSIS — I48.20 CHRONIC ATRIAL FIBRILLATION (HCC): Primary | ICD-10-CM

## 2022-03-04 PROCEDURE — G8484 FLU IMMUNIZE NO ADMIN: HCPCS | Performed by: INTERNAL MEDICINE

## 2022-03-04 PROCEDURE — 3017F COLORECTAL CA SCREEN DOC REV: CPT | Performed by: INTERNAL MEDICINE

## 2022-03-04 PROCEDURE — 1123F ACP DISCUSS/DSCN MKR DOCD: CPT | Performed by: INTERNAL MEDICINE

## 2022-03-04 PROCEDURE — 99205 OFFICE O/P NEW HI 60 MIN: CPT | Performed by: INTERNAL MEDICINE

## 2022-03-04 PROCEDURE — 4004F PT TOBACCO SCREEN RCVD TLK: CPT | Performed by: INTERNAL MEDICINE

## 2022-03-04 PROCEDURE — G8417 CALC BMI ABV UP PARAM F/U: HCPCS | Performed by: INTERNAL MEDICINE

## 2022-03-04 PROCEDURE — 93000 ELECTROCARDIOGRAM COMPLETE: CPT | Performed by: INTERNAL MEDICINE

## 2022-03-04 PROCEDURE — G8427 DOCREV CUR MEDS BY ELIG CLIN: HCPCS | Performed by: INTERNAL MEDICINE

## 2022-03-04 PROCEDURE — 4040F PNEUMOC VAC/ADMIN/RCVD: CPT | Performed by: INTERNAL MEDICINE

## 2022-03-04 RX ORDER — OXYCODONE HYDROCHLORIDE 60 MG/1
TABLET, FILM COATED, EXTENDED RELEASE ORAL
COMMUNITY
Start: 2022-03-01

## 2022-03-04 RX ORDER — OXYCODONE HYDROCHLORIDE 80 MG/1
TABLET, FILM COATED, EXTENDED RELEASE ORAL
COMMUNITY
Start: 2022-03-01

## 2022-03-04 NOTE — PROGRESS NOTES
Patient was seen in Office today to have 14 day zio xt  monitor put on per Dr. Ender Dietrich. Pt tolerated placement and understood use and return of device number [id number].     Electronically signed by Justine Hamilton MA on 3/4/2022 at 1:46 PM

## 2022-03-28 ENCOUNTER — TELEPHONE (OUTPATIENT)
Dept: INTERNAL MEDICINE | Age: 72
End: 2022-03-28

## 2022-03-28 DIAGNOSIS — I48.20 CHRONIC ATRIAL FIBRILLATION (HCC): ICD-10-CM

## 2022-03-28 NOTE — TELEPHONE ENCOUNTER
Pt called LSW requesting advice re: concerns related to his Eliquis; pt states when he was preparing for back epidural injection and was off Eliquis for 2 days; he noted leg pain as well as increased clotting when taking his blood sugar; and took 1 dose of Eliquis 0n 3.19 (scheduled for epidural on 3.21.22. Pt reports Doctor pain Clinic did not proceed with procedure and has suggested for pt to discuss with doctor as they offered concern to pt since he is fully COVID vaccinated.   Advised pt this info to be forwarded to clinical staff    Please contact patient to discuss

## 2022-03-29 ENCOUNTER — CARE COORDINATION (OUTPATIENT)
Dept: INTERNAL MEDICINE | Age: 72
End: 2022-03-29

## 2022-03-29 ENCOUNTER — OFFICE VISIT (OUTPATIENT)
Dept: INTERNAL MEDICINE | Age: 72
End: 2022-03-29
Payer: MEDICARE

## 2022-03-29 VITALS
DIASTOLIC BLOOD PRESSURE: 75 MMHG | RESPIRATION RATE: 18 BRPM | TEMPERATURE: 97.2 F | OXYGEN SATURATION: 98 % | HEIGHT: 74 IN | WEIGHT: 315 LBS | HEART RATE: 80 BPM | BODY MASS INDEX: 40.43 KG/M2 | SYSTOLIC BLOOD PRESSURE: 132 MMHG

## 2022-03-29 DIAGNOSIS — E11.69 DIABETES MELLITUS TYPE 2 IN OBESE (HCC): Primary | ICD-10-CM

## 2022-03-29 DIAGNOSIS — Z88.8 ALLERGY TO LISINOPRIL: ICD-10-CM

## 2022-03-29 DIAGNOSIS — E78.5 HYPERLIPIDEMIA, UNSPECIFIED HYPERLIPIDEMIA TYPE: ICD-10-CM

## 2022-03-29 DIAGNOSIS — N52.9 DIFFICULTY ATTAINING ERECTION: ICD-10-CM

## 2022-03-29 DIAGNOSIS — G89.29 CHRONIC MIDLINE LOW BACK PAIN WITH SCIATICA, SCIATICA LATERALITY UNSPECIFIED: ICD-10-CM

## 2022-03-29 DIAGNOSIS — M54.40 CHRONIC MIDLINE LOW BACK PAIN WITH SCIATICA, SCIATICA LATERALITY UNSPECIFIED: ICD-10-CM

## 2022-03-29 DIAGNOSIS — E66.9 DIABETES MELLITUS TYPE 2 IN OBESE (HCC): Primary | ICD-10-CM

## 2022-03-29 LAB — HBA1C MFR BLD: 7.7 %

## 2022-03-29 PROCEDURE — 3051F HG A1C>EQUAL 7.0%<8.0%: CPT | Performed by: INTERNAL MEDICINE

## 2022-03-29 PROCEDURE — 83036 HEMOGLOBIN GLYCOSYLATED A1C: CPT | Performed by: INTERNAL MEDICINE

## 2022-03-29 PROCEDURE — 1123F ACP DISCUSS/DSCN MKR DOCD: CPT | Performed by: INTERNAL MEDICINE

## 2022-03-29 PROCEDURE — 4040F PNEUMOC VAC/ADMIN/RCVD: CPT | Performed by: INTERNAL MEDICINE

## 2022-03-29 PROCEDURE — G8427 DOCREV CUR MEDS BY ELIG CLIN: HCPCS | Performed by: INTERNAL MEDICINE

## 2022-03-29 PROCEDURE — 3017F COLORECTAL CA SCREEN DOC REV: CPT | Performed by: INTERNAL MEDICINE

## 2022-03-29 PROCEDURE — G8417 CALC BMI ABV UP PARAM F/U: HCPCS | Performed by: INTERNAL MEDICINE

## 2022-03-29 PROCEDURE — 2022F DILAT RTA XM EVC RTNOPTHY: CPT | Performed by: INTERNAL MEDICINE

## 2022-03-29 PROCEDURE — 4004F PT TOBACCO SCREEN RCVD TLK: CPT | Performed by: INTERNAL MEDICINE

## 2022-03-29 PROCEDURE — 99212 OFFICE O/P EST SF 10 MIN: CPT | Performed by: INTERNAL MEDICINE

## 2022-03-29 PROCEDURE — 99214 OFFICE O/P EST MOD 30 MIN: CPT | Performed by: INTERNAL MEDICINE

## 2022-03-29 PROCEDURE — G8484 FLU IMMUNIZE NO ADMIN: HCPCS | Performed by: INTERNAL MEDICINE

## 2022-03-29 RX ORDER — NALOXONE HYDROCHLORIDE 4 MG/.1ML
SPRAY NASAL
COMMUNITY
Start: 2022-03-01

## 2022-03-29 ASSESSMENT — PATIENT HEALTH QUESTIONNAIRE - PHQ9
SUM OF ALL RESPONSES TO PHQ QUESTIONS 1-9: 0
2. FEELING DOWN, DEPRESSED OR HOPELESS: 0
SUM OF ALL RESPONSES TO PHQ9 QUESTIONS 1 & 2: 0
SUM OF ALL RESPONSES TO PHQ QUESTIONS 1-9: 0
1. LITTLE INTEREST OR PLEASURE IN DOING THINGS: 0
SUM OF ALL RESPONSES TO PHQ QUESTIONS 1-9: 0
SUM OF ALL RESPONSES TO PHQ QUESTIONS 1-9: 0

## 2022-03-29 ASSESSMENT — ENCOUNTER SYMPTOMS
GASTROINTESTINAL NEGATIVE: 1
BACK PAIN: 1
RESPIRATORY NEGATIVE: 1
ALLERGIC/IMMUNOLOGIC NEGATIVE: 1
EYES NEGATIVE: 1

## 2022-03-29 NOTE — PATIENT INSTRUCTIONS
Dear Tim Pate,        Thank you for coming to your appointment today. I hope we have addressed all of your needs. Please make sure to do the following:  - Continue your medications as listed. - Increasing Metformin to 1000 mg BID. - Referrals have been made to Physical therapy and Diabetes education:  If you do not hear from the office in 1 week, please call the number listed. - We will see each other again in 3 months for Annual wellness visit. Call for a sooner appointment if needed. Have a great day!         Sincerely,  Shakila Antonio M.D  3/29/2022  9:17 AM

## 2022-03-29 NOTE — PROGRESS NOTES
[address]  InternalMedicine Residency Program  ACC Note      SUBJECTIVE:  CC: had concerns including Follow-up (states he feels like he does not have good blood flow, \" when i take my sugar it clots quickly. \"). Carrol Bhakta presented to the [facility] for a routine visit. patient has history of paroxysmal atrial fibrillation, hypertension, diabetes mellitus, left frontal lobe CVA, PFO  status post closure 2/14/19, obesity, LATISHA and BPH. He is getting radiofrequency for his back. He was told by the Dr from here to stop Eliquis and Metformin 5 days prior to the frequency. Stopped on Thursday 17th and Friday was feeling numb on the left lower extremity, he feels that it is from him clotting? Patient checked his blood sugar and feels that it clotted too much. He feels that his COVID shot has caused him to clot too much. He went for the radiofrequency on the 4th and then again on the 21st for the second one, but they woundn't give it to him at the pain clinic due to concern for clotting? Secondary to the covid vaccine. Patient also endorsed difficulty with erection timing with his wife. Was prescribed Viagra in the past, but has not tried it yet. +erections but having them in the middle of the night and not when his wife wants intercourse. Chronic AF, Dx 7/2020, on Eliquis 5 mg BID and Cardizem  mg daily. · [id number] = 5 (HTN,TIA, Age,DM)  · no hx of cardioversion, AAD therapy and ablation  · 14 day Zio to determine AF burden, per Dr. Radha Diaz >> completed. · Palpitations stopped after stopping lisinopril. · Asymptomatic. · Follow up with Dr. Radha Diaz in 3 months or sooner prn. · Resumed the Eliquis, no signs of bleeding. Cough to Lisinopril, improved after stopping, added to allergy list.     Hypertension- controlled. On Cardizem. /75 today. Non-insulin dependent type 2 Diabetes mellitus- On Metformin 500 mg BID, last A1c 6.8% in 12/2021. Controlled. Hemoglobin A1c 7.7%. Lost 3 lbs. Getting steroid shots in his back. Hyperlipidemia, On Lipitor, tolerating well. Hx Left frontal lobe embolic CVA- stable, on Eliquis. HAS-BLED score 3 -- high risk  Hx PFO Status post closure 2/14/19. BPH, on Flomax, stable. Last PSA 0.32    Obesity, Body mass index is 45.73 kg/m², lost 3 Lbs, wants to start water aerobics. LATISHA - Noncompliant to CPAP. Chronic back pain/OA, BL knee OA, mild central canal stenosis L3-L4. · s/p steroid back injections, following with pain clinic. Tobacco abuse, cutting back and down to 2 cigars, stopped cigarettes. Using the patch. Discussed the gum too in combination. Review of Systems   Constitutional: Negative. HENT: Negative. Eyes: Negative. Respiratory: Negative. Cardiovascular: Negative. Gastrointestinal: Negative. Endocrine: Negative. Genitourinary:        Difficulty with erection timing. Musculoskeletal: Positive for back pain and gait problem. Negative for neck pain. Skin: Negative. Allergic/Immunologic: Negative. Hematological: Negative. Psychiatric/Behavioral: Negative. Outpatient Medications Marked as Taking for the 3/29/22 encounter (Office Visit) with Ted Prince MD   Medication Sig Dispense Refill    naloxone [facility]) 4 MG/0.1ML LIQD nasal spray as directed      nicotine polacrilex (NICORETTE) 2 MG gum Take 1 each by mouth as needed for Smoking cessation Maximum dose: 24 pieces/24 hours. Stop chewing and park the piece of Nicorette between your cheek/ gums. After about a minute, when the tingling is almost gone, start chewing again. Repeat this process until the tingle is gone (about 30 minutes).  110 each 3    metFORMIN (GLUCOPHAGE) 1000 MG tablet Take 1 tablet by mouth 2 times daily (with meals) 180 tablet 1    oxyCODONE (OXYCONTIN) 60 MG T12A extended release tablet 1 tablet      oxyCODONE (OXYCONTIN) 80 MG extended release tablet 1 tablet      apixaban (ELIQUIS) 5 MG TABS tablet Take 1 tablet by mouth 2 times daily 180 tablet 1    atorvastatin (LIPITOR) 40 MG tablet Take 0.5 tablets by mouth nightly 45 tablet 1    meclizine (ANTIVERT) 25 MG tablet Take 1 tablet by mouth 2 times daily as needed for Dizziness 60 tablet 0    tamsulosin (FLOMAX) 0.4 MG capsule Take 1 capsule by mouth daily 30 capsule 5    dilTIAZem (CARDIZEM CD) 120 MG extended release capsule Take 1 capsule by mouth daily 30 capsule 5    vitamin B-1 (THIAMINE) 100 MG tablet Take 1 tablet by mouth daily 30 tablet 3    lidocaine (XYLOCAINE) 5 % ointment       blood glucose monitor strips 2 times daily. 100 strip 1    blood glucose monitor kit and supplies Test 2 times a day before breakfast and supper 1 kit 0    oxyCODONE (ROXICODONE) 5 MG immediate release tablet Take 5 mg by mouth every 6 hours as needed. Ordered through [facility] Pain Clinic         I have reviewed all pertinent PMHx, PSHx, FamHx, SocialHx, medications, and allergiesand updated history as appropriate. OBJECTIVE:    VS: /75 (Site: Right Upper Arm, Position: Sitting, Cuff Size: Large Adult)   Pulse 80   Temp 97.2 °F (36.2 °C) (Temporal)   Resp 18   Ht 6' 2\" (1.88 m)   Wt (!) 353 lb (160.1 kg)   SpO2 98% Comment: room air  BMI 45.32 kg/m²   Physical Exam  Constitutional:       Appearance: He is obese. HENT:      Head: Normocephalic and atraumatic. Nose: Nose normal.   Eyes:      Extraocular Movements: Extraocular movements intact. Cardiovascular:      Rate and Rhythm: Normal rate and regular rhythm. Heart sounds: No murmur heard. No friction rub. No gallop. Pulmonary:      Breath sounds: Decreased breath sounds present. No wheezing, rhonchi or rales. Abdominal:      Palpations: Abdomen is soft. There is no mass. Tenderness: There is no abdominal tenderness. There is no guarding. Musculoskeletal:      Cervical back: Normal range of motion and neck supple.    Skin:

## 2022-03-29 NOTE — PROGRESS NOTES
[address]  Internal Medicine Clinic    Attending Physician Statement:  Melissa Kapoor M.D., F.A.C.P. I have seen/discussed the case, including pertinent history and exam findings with the resident. I agree with the assessment, plan and orders as documented by the resident. Patient is seen for fu visit today. Last office notes reviewed, relative labs and imaging. ldl 81 on statin  dm2  aic 7.7 higher  On metformin  Afib- chads vasc 5- on eliquis and cardizem  /75 (Site: Right Upper Arm, Position: Sitting, Cuff Size: Large Adult)   Pulse 80   Temp 97.2 °F (36.2 °C) (Temporal)   Resp 18   Ht 6' 2\" (1.88 m)   Wt (!) 353 lb (160.1 kg)   SpO2 98% Comment: room air  BMI 45.32 kg/m²   HR controlled  Inc bmi, +LATISHA non compliance cpap  Back pain, radiofrequency ablation    Remainder of medical problems as per resident note.

## 2022-03-29 NOTE — CARE COORDINATION
Outpatient education tracking log    Participant Name: Alex Cobos  Referring Provider: Annamarie Coates MD    Topics/Learning Objectives Initial visit   Follow-up  Follow-up Follow-up Comments   Diabetes disease process & Treatment process:   -Define diabetes & prediabetes and ABCs of     diabetes   -Identify own type of diabetes  -Identify lifestyle changes/treatment options 3/29/2022     Lab Results   Component Value Date    LABA1C 7.7 03/29/2022    LABA1C 6.8 12/08/2021    LABA1C 6.5 (H) 08/18/2021     Met with Rea Avilesir who is NIDDM states he has had diabetes for 5 years. A1c was higher today and was asked by the doctor to see him. His Metformin is being increased today. He reports he recently had a steroid shot in his back. We went over meal planning, diabetic portion plate and carbohydrates. Reviewed the benefits of weight control and weight loss and exercise. He walks with a cane. He has a glucometer but only occasionally checks his blood sugars. We went over Diabetes Targets for BS and A1c and what happens in the body over time from uncontrolled blood sugars. Has a goal to watch his carbohydrate and sugars. Cut back on snacking and sweet drinks. Developing strategies for Healthy coping/psychosocial issues:    -Describe feelings about living with diabetes  -Identify coping strategies;   -Identify support needed & support network available 3/29/2022            Prevention, detection & treatment of Chronic complications:    -Identify the prevention, detection and treatment for complications including immunizations, preventive eye, foot, dental and renal exams as indicated per the participant's duration of diabetes and health status.  -Define the natural course of diabetes and the relationship of blood glucose levels to long term complications of diabetes. 3/29/2022      Revewed: Definition of diabetes, Diabetic Targets, A1c and what it means.  Self-monitoring of blood glucose and the importance of checking blood sugars Hypo and Hyperglycemia signs and symptoms, possible causes and treatment. The importance of Medication adherence. The plate method and meal planning guidelines, what carbohydrates are and how they effect your blood sugar. The benefits of exercise. What uncontrolled diabetes does to your body and reducing the risk of chronic complications. Encouraged to go to diabetic education classes also reminded that a dietitian available. Prevention, detection & treatment of acute complications:    -List symptoms of hyper & hypoglycemia, and prevention & treatment strategies.   -Describe sick day guidelines  DKA /indications for ketone testing &  when to call physician  3/29/2022 SM       Identify severe weather/situation crisis  & diabetes supplies management        Using medications safely:   -State effects of diabetes medicines on blood glucose levels;  -List diabetes medication taken, action & side effects 3/29/2022 SM    Metformin 1000 mg bid. Does not like needles   Insulin/Injectables  -Name appropriate injection sites; proper storage; supplies needed;          Demonstrate proper technique        Monitoring blood glucose, interpreting and using results:   -Identify recommended & personal blood glucose targets & HgbA1C target levels  -State the Importance of logging blood glucose levels for pattern recognition;   -State benefits of reading/using pt generated health data  -Verbalize safe lancet disposal 3/29/2022 SM    Encouraged to monitor and bring blood sugar logs in   -Demonstrate proper testing technique        Incorporating physical activity into lifestyle:   -State effect of exercise on blood glucose levels;   -State benefits of regular exercise;   -Define safety considerations;  -Describe contraindications/maintenance of activity.  3/29/2022 SM    Reviewed the benefits of regular exercise   Incorporating nutritional management into lifestyle:   -Describe effect of type, amount & timing of food on blood glucose  -Describe methods for preparing and planning healthy meals 3/29/2022 SM    Reviewed meal planning and food and blood glucose effect               Supporting Education Materials/Equipment Provided: ADA into to diabetes, diabetic survival packet, chronic complication that can develop from uncontrolled diabetes. A1c Tracking  Lab Results   Component Value Date    LABA1C 7.7 03/29/2022    LABA1C 6.8 12/08/2021    LABA1C 6.5 (H) 08/18/2021         Date Initial A1c 6 month f/u  1 year post ed.  Difference  Comments   3/29/2022 7.7                []Patient lost to follow-up : date-      [] Attended DSMES on:  [] Attended yearly DSMES follow-up on:   [] Attended MNTon :

## 2022-04-09 ENCOUNTER — APPOINTMENT (OUTPATIENT)
Dept: CT IMAGING | Age: 72
DRG: 101 | End: 2022-04-09
Payer: MEDICARE

## 2022-04-09 ENCOUNTER — HOSPITAL ENCOUNTER (INPATIENT)
Age: 72
LOS: 2 days | Discharge: HOME | DRG: 101 | End: 2022-04-11
Attending: EMERGENCY MEDICINE | Admitting: INTERNAL MEDICINE
Payer: MEDICARE

## 2022-04-09 ENCOUNTER — APPOINTMENT (OUTPATIENT)
Dept: GENERAL RADIOLOGY | Age: 72
DRG: 101 | End: 2022-04-09
Payer: MEDICARE

## 2022-04-09 DIAGNOSIS — G45.9 TIA (TRANSIENT ISCHEMIC ATTACK): Primary | ICD-10-CM

## 2022-04-09 DIAGNOSIS — G47.33 OBSTRUCTIVE SLEEP APNEA: Chronic | ICD-10-CM

## 2022-04-09 LAB
ACETAMINOPHEN LEVEL: <5 MCG/ML (ref 10–30)
ALBUMIN SERPL-MCNC: 3.8 G/DL (ref 3.5–5.2)
ALP BLD-CCNC: 89 U/L (ref 40–129)
ALT SERPL-CCNC: 13 U/L (ref 0–40)
ANION GAP SERPL CALCULATED.3IONS-SCNC: 11 MMOL/L (ref 7–16)
AST SERPL-CCNC: 14 U/L (ref 0–39)
BACTERIA: ABNORMAL /HPF
BASOPHILS ABSOLUTE: 0.04 E9/L (ref 0–0.2)
BASOPHILS RELATIVE PERCENT: 0.7 % (ref 0–2)
BILIRUB SERPL-MCNC: 0.3 MG/DL (ref 0–1.2)
BILIRUBIN URINE: NEGATIVE
BLOOD, URINE: NEGATIVE
BUN BLDV-MCNC: 15 MG/DL (ref 6–23)
CALCIUM SERPL-MCNC: 8.8 MG/DL (ref 8.6–10.2)
CHLORIDE BLD-SCNC: 99 MMOL/L (ref 98–107)
CHOLESTEROL, TOTAL: 120 MG/DL (ref 0–199)
CHP ED QC CHECK: YES
CLARITY: CLEAR
CO2: 24 MMOL/L (ref 22–29)
COLOR: YELLOW
CREAT SERPL-MCNC: 1 MG/DL (ref 0.7–1.2)
EOSINOPHILS ABSOLUTE: 0.09 E9/L (ref 0.05–0.5)
EOSINOPHILS RELATIVE PERCENT: 1.5 % (ref 0–6)
EPITHELIAL CELLS, UA: ABNORMAL /HPF
ETHANOL: <10 MG/DL (ref 0–0.08)
GFR AFRICAN AMERICAN: >60
GFR NON-AFRICAN AMERICAN: >60 ML/MIN/1.73
GLUCOSE BLD-MCNC: 248 MG/DL
GLUCOSE BLD-MCNC: 266 MG/DL (ref 74–99)
GLUCOSE URINE: 100 MG/DL
HCT VFR BLD CALC: 38.1 % (ref 37–54)
HDLC SERPL-MCNC: 51 MG/DL
HEMOGLOBIN: 12 G/DL (ref 12.5–16.5)
IMMATURE GRANULOCYTES #: 0.01 E9/L
IMMATURE GRANULOCYTES %: 0.2 % (ref 0–5)
KETONES, URINE: NEGATIVE MG/DL
LDL CHOLESTEROL CALCULATED: 52 MG/DL (ref 0–99)
LEUKOCYTE ESTERASE, URINE: NEGATIVE
LYMPHOCYTES ABSOLUTE: 2.47 E9/L (ref 1.5–4)
LYMPHOCYTES RELATIVE PERCENT: 40.2 % (ref 20–42)
MCH RBC QN AUTO: 28.9 PG (ref 26–35)
MCHC RBC AUTO-ENTMCNC: 31.5 % (ref 32–34.5)
MCV RBC AUTO: 91.8 FL (ref 80–99.9)
METER GLUCOSE: 168 MG/DL (ref 74–99)
METER GLUCOSE: 248 MG/DL (ref 74–99)
MONOCYTES ABSOLUTE: 0.48 E9/L (ref 0.1–0.95)
MONOCYTES RELATIVE PERCENT: 7.8 % (ref 2–12)
NEUTROPHILS ABSOLUTE: 3.06 E9/L (ref 1.8–7.3)
NEUTROPHILS RELATIVE PERCENT: 49.6 % (ref 43–80)
NITRITE, URINE: NEGATIVE
PDW BLD-RTO: 12.5 FL (ref 11.5–15)
PH UA: 5.5 (ref 5–9)
PLATELET # BLD: 313 E9/L (ref 130–450)
PMV BLD AUTO: 9.3 FL (ref 7–12)
POTASSIUM REFLEX MAGNESIUM: 4.6 MMOL/L (ref 3.5–5)
PROTEIN UA: NEGATIVE MG/DL
RBC # BLD: 4.15 E12/L (ref 3.8–5.8)
RBC UA: ABNORMAL /HPF (ref 0–2)
SALICYLATE, SERUM: <0.3 MG/DL (ref 0–30)
SODIUM BLD-SCNC: 134 MMOL/L (ref 132–146)
SPECIFIC GRAVITY UA: 1.02 (ref 1–1.03)
TOTAL PROTEIN: 7.5 G/DL (ref 6.4–8.3)
TRICYCLIC ANTIDEPRESSANTS SCREEN SERUM: NEGATIVE NG/ML
TRIGL SERPL-MCNC: 85 MG/DL (ref 0–149)
TROPONIN, HIGH SENSITIVITY: 12 NG/L (ref 0–11)
TROPONIN, HIGH SENSITIVITY: 13 NG/L (ref 0–11)
UROBILINOGEN, URINE: 0.2 E.U./DL
VLDLC SERPL CALC-MCNC: 17 MG/DL
WBC # BLD: 6.2 E9/L (ref 4.5–11.5)
WBC UA: ABNORMAL /HPF (ref 0–5)

## 2022-04-09 PROCEDURE — 93005 ELECTROCARDIOGRAM TRACING: CPT | Performed by: PHYSICIAN ASSISTANT

## 2022-04-09 PROCEDURE — 70450 CT HEAD/BRAIN W/O DYE: CPT

## 2022-04-09 PROCEDURE — 71045 X-RAY EXAM CHEST 1 VIEW: CPT

## 2022-04-09 PROCEDURE — 82077 ASSAY SPEC XCP UR&BREATH IA: CPT

## 2022-04-09 PROCEDURE — 84484 ASSAY OF TROPONIN QUANT: CPT

## 2022-04-09 PROCEDURE — 80179 DRUG ASSAY SALICYLATE: CPT

## 2022-04-09 PROCEDURE — 85025 COMPLETE CBC W/AUTO DIFF WBC: CPT

## 2022-04-09 PROCEDURE — 6370000000 HC RX 637 (ALT 250 FOR IP): Performed by: STUDENT IN AN ORGANIZED HEALTH CARE EDUCATION/TRAINING PROGRAM

## 2022-04-09 PROCEDURE — 80061 LIPID PANEL: CPT

## 2022-04-09 PROCEDURE — 2060000000 HC ICU INTERMEDIATE R&B

## 2022-04-09 PROCEDURE — 80143 DRUG ASSAY ACETAMINOPHEN: CPT

## 2022-04-09 PROCEDURE — 99284 EMERGENCY DEPT VISIT MOD MDM: CPT

## 2022-04-09 PROCEDURE — 36415 COLL VENOUS BLD VENIPUNCTURE: CPT

## 2022-04-09 PROCEDURE — 81001 URINALYSIS AUTO W/SCOPE: CPT

## 2022-04-09 PROCEDURE — 82962 GLUCOSE BLOOD TEST: CPT

## 2022-04-09 PROCEDURE — 80053 COMPREHEN METABOLIC PANEL: CPT

## 2022-04-09 PROCEDURE — 2580000003 HC RX 258: Performed by: STUDENT IN AN ORGANIZED HEALTH CARE EDUCATION/TRAINING PROGRAM

## 2022-04-09 PROCEDURE — 80307 DRUG TEST PRSMV CHEM ANLYZR: CPT

## 2022-04-09 RX ORDER — ONDANSETRON 2 MG/ML
4 INJECTION INTRAMUSCULAR; INTRAVENOUS EVERY 6 HOURS PRN
Status: DISCONTINUED | OUTPATIENT
Start: 2022-04-09 | End: 2022-04-11 | Stop reason: HOSPADM

## 2022-04-09 RX ORDER — ACETAMINOPHEN 325 MG/1
650 TABLET ORAL EVERY 6 HOURS PRN
Status: DISCONTINUED | OUTPATIENT
Start: 2022-04-09 | End: 2022-04-11 | Stop reason: HOSPADM

## 2022-04-09 RX ORDER — ATORVASTATIN CALCIUM 40 MG/1
40 TABLET, FILM COATED ORAL NIGHTLY
Status: DISCONTINUED | OUTPATIENT
Start: 2022-04-09 | End: 2022-04-11 | Stop reason: HOSPADM

## 2022-04-09 RX ORDER — DILTIAZEM HYDROCHLORIDE 120 MG/1
120 CAPSULE, COATED, EXTENDED RELEASE ORAL DAILY
Status: DISCONTINUED | OUTPATIENT
Start: 2022-04-10 | End: 2022-04-11 | Stop reason: HOSPADM

## 2022-04-09 RX ORDER — ATORVASTATIN CALCIUM 10 MG/1
20 TABLET, FILM COATED ORAL NIGHTLY
Status: CANCELLED | OUTPATIENT
Start: 2022-04-09

## 2022-04-09 RX ORDER — SODIUM CHLORIDE 0.9 % (FLUSH) 0.9 %
5-40 SYRINGE (ML) INJECTION PRN
Status: DISCONTINUED | OUTPATIENT
Start: 2022-04-09 | End: 2022-04-11 | Stop reason: HOSPADM

## 2022-04-09 RX ORDER — DICYCLOMINE HYDROCHLORIDE 10 MG/1
10 CAPSULE ORAL EVERY 6 HOURS PRN
Status: DISCONTINUED | OUTPATIENT
Start: 2022-04-09 | End: 2022-04-11 | Stop reason: HOSPADM

## 2022-04-09 RX ORDER — ATORVASTATIN CALCIUM 40 MG/1
40 TABLET, FILM COATED ORAL NIGHTLY
Status: DISCONTINUED | OUTPATIENT
Start: 2022-04-09 | End: 2022-04-09

## 2022-04-09 RX ORDER — LABETALOL HYDROCHLORIDE 5 MG/ML
10 INJECTION, SOLUTION INTRAVENOUS EVERY 6 HOURS PRN
Status: DISCONTINUED | OUTPATIENT
Start: 2022-04-09 | End: 2022-04-11 | Stop reason: HOSPADM

## 2022-04-09 RX ORDER — TAMSULOSIN HYDROCHLORIDE 0.4 MG/1
0.4 CAPSULE ORAL DAILY
Status: DISCONTINUED | OUTPATIENT
Start: 2022-04-10 | End: 2022-04-11 | Stop reason: HOSPADM

## 2022-04-09 RX ORDER — POLYETHYLENE GLYCOL 3350 17 G/17G
17 POWDER, FOR SOLUTION ORAL DAILY PRN
Status: DISCONTINUED | OUTPATIENT
Start: 2022-04-09 | End: 2022-04-11 | Stop reason: HOSPADM

## 2022-04-09 RX ORDER — ONDANSETRON 4 MG/1
4 TABLET, ORALLY DISINTEGRATING ORAL EVERY 8 HOURS PRN
Status: DISCONTINUED | OUTPATIENT
Start: 2022-04-09 | End: 2022-04-11 | Stop reason: HOSPADM

## 2022-04-09 RX ORDER — ACETAMINOPHEN 650 MG/1
650 SUPPOSITORY RECTAL EVERY 6 HOURS PRN
Status: DISCONTINUED | OUTPATIENT
Start: 2022-04-09 | End: 2022-04-11 | Stop reason: HOSPADM

## 2022-04-09 RX ORDER — MECLIZINE HCL 12.5 MG/1
25 TABLET ORAL 2 TIMES DAILY PRN
Status: DISCONTINUED | OUTPATIENT
Start: 2022-04-09 | End: 2022-04-11 | Stop reason: HOSPADM

## 2022-04-09 RX ORDER — SODIUM CHLORIDE 9 MG/ML
INJECTION, SOLUTION INTRAVENOUS PRN
Status: DISCONTINUED | OUTPATIENT
Start: 2022-04-09 | End: 2022-04-11 | Stop reason: HOSPADM

## 2022-04-09 RX ORDER — DEXTROSE MONOHYDRATE 50 MG/ML
100 INJECTION, SOLUTION INTRAVENOUS PRN
Status: DISCONTINUED | OUTPATIENT
Start: 2022-04-09 | End: 2022-04-11 | Stop reason: HOSPADM

## 2022-04-09 RX ORDER — NICOTINE POLACRILEX 4 MG
15 LOZENGE BUCCAL PRN
Status: DISCONTINUED | OUTPATIENT
Start: 2022-04-09 | End: 2022-04-11 | Stop reason: HOSPADM

## 2022-04-09 RX ORDER — SODIUM CHLORIDE 0.9 % (FLUSH) 0.9 %
5-40 SYRINGE (ML) INJECTION EVERY 12 HOURS SCHEDULED
Status: DISCONTINUED | OUTPATIENT
Start: 2022-04-09 | End: 2022-04-11 | Stop reason: HOSPADM

## 2022-04-09 RX ORDER — DEXTROSE MONOHYDRATE 25 G/50ML
12.5 INJECTION, SOLUTION INTRAVENOUS PRN
Status: DISCONTINUED | OUTPATIENT
Start: 2022-04-09 | End: 2022-04-11 | Stop reason: HOSPADM

## 2022-04-09 RX ORDER — GAUZE BANDAGE 2" X 2"
100 BANDAGE TOPICAL DAILY
Status: DISCONTINUED | OUTPATIENT
Start: 2022-04-10 | End: 2022-04-11 | Stop reason: HOSPADM

## 2022-04-09 RX ADMIN — Medication 10 ML: at 21:37

## 2022-04-09 RX ADMIN — APIXABAN 5 MG: 5 TABLET, FILM COATED ORAL at 21:58

## 2022-04-09 ASSESSMENT — PAIN DESCRIPTION - LOCATION: LOCATION: HEAD

## 2022-04-09 ASSESSMENT — PAIN DESCRIPTION - PAIN TYPE: TYPE: ACUTE PAIN

## 2022-04-09 ASSESSMENT — PAIN DESCRIPTION - ORIENTATION: ORIENTATION: RIGHT

## 2022-04-09 ASSESSMENT — PAIN DESCRIPTION - FREQUENCY: FREQUENCY: CONTINUOUS

## 2022-04-09 ASSESSMENT — PAIN - FUNCTIONAL ASSESSMENT: PAIN_FUNCTIONAL_ASSESSMENT: 0-10

## 2022-04-09 ASSESSMENT — PAIN DESCRIPTION - DESCRIPTORS: DESCRIPTORS: ACHING

## 2022-04-09 ASSESSMENT — PAIN SCALES - GENERAL: PAINLEVEL_OUTOF10: 2

## 2022-04-09 NOTE — ED NOTES
Informed pt of need for troponin draw and IV access. Pt stated he is going to the lobby until a room is available, refusing. Pt instructed that he needs an IV in order to be admitted. Pt stated, \"I told ya'll what I'm gonna do. \" Pt left department and walked out.       Afton Maxcy [facility] [place], RN  04/09/22 [id number]

## 2022-04-09 NOTE — ED NOTES
Department of Emergency Medicine  FIRST PROVIDER TRIAGE NOTE             Independent MLP           4/9/22  12:07 PM EDT    Date of Encounter: 4/9/22   MRN: [id number]      HPI: Ciro Osborne is a 73 y.o. male who presents to the ED for Dizziness (episodes of confusion, headache, A/O x4,lightheaded, takes elliquis, hx DM)   Patient is reporting to the ED for dizziness, headache, and intermittent confusion. Reports that the headache started around 0200 this morning and located to the right frontal area. He states that he forgot how to use his car keys today (how to put them in and out of the ignition.) Patient describes his dizziness as a lightheadedness. Patient is on eliquis-no recent changes. No recent falls/injury/trauma. Reports symptoms did improve upon arrival to the ED. Confusion resolved. Does still have headache. ROS: Negative for cp or sob. PE: Gen Appearance/Constitutional: alert  Musculoskeletal: moves all extremities x 4     Initial Plan of Care: All treatment areas with department are currently occupied. Plan to order/Initiate the following while awaiting opening in ED: labs, EKG and imaging studies.   Initiate Treatment-Testing, Proceed toTreatment Area When Bed Available for ED Attending/MLP to Continue Care    Electronically signed by KOREY Antunez   DD: 4/9/22         Yosef Antunez  04/09/22 [address] [place]  04/09/22 1212

## 2022-04-09 NOTE — ED PROVIDER NOTES
HPI:  4/9/22, Time: 5:42 PM EDT         Gerson Ramsey is a 73 y.o. male presenting to the ED for an episode of confusion occurring earlier today. Patient states he was in his car when he suddenly could not put his key in the ignition or use his phone. States it lasted about 45 minutes and resolved spontaneously. He is unaware of any associated symptoms of focal numbness or weakness or dysarthria though he states he did not try to talk. Symptoms were moderate in severity, intermittent, resolved, no exacerbating or alleviating factors. He has a history of prior stroke and has a stutter from that but otherwise no focal deficits. He does have a history of A. fib and follows with EP. He is on Eliquis. He denies fevers, falls or trauma, chest pain, shortness of breath, cough abdominal pain, emesis, or diarrhea. States he took aspirin prior to arrival.    Review of Systems:   Pertinent positives and negatives are stated within HPI, all other systems reviewed and are negative.          --------------------------------------------- PAST HISTORY ---------------------------------------------  Past Medical History:  has a past medical history of Arthritis, Back pain, Cerebral artery occlusion with cerebral infarction ([facility] [address] Depression, Diabetes mellitus ([facility] [address] Hx of blood clots, Hyperlipidemia, PFO (patent foramen ovale), S/P patent foramen ovale closure, Sleep apnea, and Testicular mass. Past Surgical History:  has a past surgical history that includes knee surgery (1981); Achilles tendon surgery (1975); Tonsillectomy (1950s); Colonoscopy (1990s); Hydrocele surgery (5/1/2002); Hydrocele surgery (10/24/2012); Testicle removal (6/19/2013); other surgical history (6/19/2013); Nerve Block (2/24/14); Nerve Block (3/10/14); Colonoscopy (7/28/2015); eye surgery; transesophageal echocardiogram (10/01/2018); other surgical history (02/14/2019); and Tooth Extraction.     Social History:  reports that he has been smoking cigarettes and cigars. He started smoking about 18 years ago. He has a 6.80 pack-year smoking history. He has never used smokeless tobacco. He reports current alcohol use. He reports previous drug use. Family History: family history includes Diabetes in his father and maternal grandmother. The patients home medications have been reviewed.     Allergies: Lisinopril and Toradol [ketorolac tromethamine]    -------------------------------------------------- RESULTS -------------------------------------------------  All laboratory and radiology results have been personally reviewed by myself   LABS:  Results for orders placed or performed during the hospital encounter of 04/09/22   CBC with Auto Differential   Result Value Ref Range    WBC 6.2 4.5 - 11.5 E9/L    RBC 4.15 3.80 - 5.80 E12/L    Hemoglobin 12.0 (L) 12.5 - 16.5 g/dL    Hematocrit 38.1 37.0 - 54.0 %    MCV 91.8 80.0 - 99.9 fL    MCH 28.9 26.0 - 35.0 pg    MCHC 31.5 (L) 32.0 - 34.5 %    RDW 12.5 11.5 - 15.0 fL    Platelets 010 160 - 962 E9/L    MPV 9.3 7.0 - 12.0 fL    Neutrophils % 49.6 43.0 - 80.0 %    Immature Granulocytes % 0.2 0.0 - 5.0 %    Lymphocytes % 40.2 20.0 - 42.0 %    Monocytes % 7.8 2.0 - 12.0 %    Eosinophils % 1.5 0.0 - 6.0 %    Basophils % 0.7 0.0 - 2.0 %    Neutrophils Absolute 3.06 1.80 - 7.30 E9/L    Immature Granulocytes # 0.01 E9/L    Lymphocytes Absolute 2.47 1.50 - 4.00 E9/L    Monocytes Absolute 0.48 0.10 - 0.95 E9/L    Eosinophils Absolute 0.09 0.05 - 0.50 E9/L    Basophils Absolute 0.04 0.00 - 0.20 E9/L   Comprehensive Metabolic Panel w/ Reflex to MG   Result Value Ref Range    Sodium 134 132 - 146 mmol/L    Potassium reflex Magnesium 4.6 3.5 - 5.0 mmol/L    Chloride 99 98 - 107 mmol/L    CO2 24 22 - 29 mmol/L    Anion Gap 11 7 - 16 mmol/L    Glucose 266 (H) 74 - 99 mg/dL    BUN 15 6 - 23 mg/dL    CREATININE 1.0 0.7 - 1.2 mg/dL    GFR Non-African American >60 >=60 mL/min/1.73    GFR African American >60     Calcium 8.8 8.6 - 10.2 mg/dL    Total Protein 7.5 6.4 - 8.3 g/dL    Albumin 3.8 3.5 - 5.2 g/dL    Total Bilirubin 0.3 0.0 - 1.2 mg/dL    Alkaline Phosphatase 89 40 - 129 U/L    ALT 13 0 - 40 U/L    AST 14 0 - 39 U/L   Troponin   Result Value Ref Range    Troponin, High Sensitivity 12 (H) 0 - 11 ng/L   Urinalysis with Microscopic   Result Value Ref Range    Color, UA Yellow Straw/Yellow    Clarity, UA Clear Clear    Glucose, Ur 100 (A) Negative mg/dL    Bilirubin Urine Negative Negative    Ketones, Urine Negative Negative mg/dL    Specific Gravity, UA 1.025 1.005 - 1.030    Blood, Urine Negative Negative    pH, UA 5.5 5.0 - 9.0    Protein, UA Negative Negative mg/dL    Urobilinogen, Urine 0.2 <2.0 E.U./dL    Nitrite, Urine Negative Negative    Leukocyte Esterase, Urine Negative Negative    WBC, UA NONE 0 - 5 /HPF    RBC, UA NONE 0 - 2 /HPF    Epithelial Cells, UA FEW /HPF    Bacteria, UA NONE SEEN None Seen /HPF   Troponin   Result Value Ref Range    Troponin, High Sensitivity 13 (H) 0 - 11 ng/L   POCT Glucose   Result Value Ref Range    Glucose 248 mg/dL    QC OK? yes    POCT Glucose   Result Value Ref Range    Meter Glucose 248 (H) 74 - 99 mg/dL   EKG 12 Lead   Result Value Ref Range    Ventricular Rate 59 BPM    Atrial Rate 59 BPM    P-R Interval 186 ms    QRS Duration 108 ms    Q-T Interval 434 ms    QTc Calculation (Bazett) 429 ms    P Axis 85 degrees    R Axis -28 degrees    T Axis 33 degrees       RADIOLOGY:  Interpreted by Radiologist.  CT Head WO Contrast   Final Result   1. No acute intracranial hemorrhage or edema. 2. Redemonstration of multiple chronic areas of stroke involving bilateral   cerebellar hemispheres as well as stable chronic lacunar stroke involving   head of the caudate nucleus on the right. XR CHEST PORTABLE   Final Result   No acute cardiopulmonary process.              ------------------------- NURSING NOTES AND VITALS REVIEWED ---------------------------   The nursing notes within the ED encounter and vital signs as below have been reviewed. BP (!) 152/85   Pulse 80   Temp 97.5 °F (36.4 °C) (Oral)   Resp 16   Wt (!) 355 lb (161 kg)   SpO2 95%   BMI 45.58 kg/m²   Oxygen Saturation Interpretation: Normal      ---------------------------------------------------PHYSICAL EXAM--------------------------------------      Constitutional/General: Alert and oriented x3, well appearing, non toxic in NAD  Head: Normocephalic and atraumatic  Eyes: PERRL, EOMI  Mouth: Oropharynx clear, handling secretions, no trismus  Neck: Supple, full ROM, no nuchal rigidity, no meningeal signs  Pulmonary: Lungs clear to auscultation bilaterally, no wheezes, rales, or rhonchi. Not in respiratory distress  Cardiovascular:  Regular rate and rhythm, no murmurs, gallops, or rubs. 2+ distal pulses  Abdomen: Soft, non tender, non distended,   Extremities: Moves all extremities x 4. Warm and well perfused  Skin: warm and dry without rash  Neurologic: GCS 15. Facial symmetry. Speech clear with intermittent stuttering (patient states has been present since prior stroke). No drift to bilateral UE or LE. 5/5 strength to bilateral UE and LE. Normal sensation to bilateral upper extremities and right lower extremity. Diminished sensation to left lower extremity is a chronic intermittent problem. No ataxia with finger-to-nose or heel-to-shin  Psych: Normal Affect. Behavior normal.      ------------------------------ ED COURSE/MEDICAL DECISION MAKING----------------------  Medications - No data to display    Medical Decision Making/ED COURSE:   Patient is a 72-year-old male with history of A. fib on Eliquis and prior CVA presenting after suspected TIA. Patient is back to his neurologic baseline while in the ED. In the ED, patient was hemodynamically stable and afebrile. On exam, patient was at his neurologic baseline with no new deficits. Labs and head CT obtained.  Patient had just taken aspirin prior to arrival.    I reviewed and interpreted labs. Labs are reassuring. He had hyperglycemia of 266 but no anion gap to suggest DKA. High-sensitivity troponin is stable x2. No acute EKG changes. No acute findings on head CT. Patient's history is concerning for TIA/stroke. Plan to admit for further work-up and monitoring. Patient remained hemodynamically stable throughout ED course. ED Course as of 04/09/22 2012   Sat Apr 09, 2022   1703 EKG: This EKG is signed and interpreted by me. Rate: 59  Rhythm: Sinus  Interpretation: Sinus bradycardia, normal TX interval, normal QRS, normal QT interval, no acute ST or T wave changes  Comparison: no previous EKG available     []   [id number] Hospitalist was consulted. Dr. Rafaela Naik accepted the patient for admission. [JA]      ED Course User Index  [JA] Luis Manuel Jackson MD       Current Discharge Medication List        Luis Manuel Jackson MD      Counseling: The emergency provider has spoken with the patient and discussed todays results, in addition to providing specific details for the plan of care and counseling regarding the diagnosis and prognosis. Questions are answered at this time and they are agreeable with the plan.      --------------------------------- IMPRESSION AND DISPOSITION ---------------------------------    IMPRESSION  1. TIA (transient ischemic attack)        DISPOSITION  Disposition: Admit to telemetry  Patient condition is stable      NOTE: This report was transcribed using voice recognition software.  Every effort was made to ensure accuracy; however, inadvertent computerized transcription errors may be present    ILuis Manuel MD, am the primary provider of this record       Luis Manuel Jackson MD  04/09/22 2012

## 2022-04-10 ENCOUNTER — APPOINTMENT (OUTPATIENT)
Dept: ULTRASOUND IMAGING | Age: 72
DRG: 101 | End: 2022-04-10
Payer: MEDICARE

## 2022-04-10 LAB
ANION GAP SERPL CALCULATED.3IONS-SCNC: 13 MMOL/L (ref 7–16)
BASOPHILS ABSOLUTE: 0.04 E9/L (ref 0–0.2)
BASOPHILS RELATIVE PERCENT: 0.7 % (ref 0–2)
BUN BLDV-MCNC: 13 MG/DL (ref 6–23)
CALCIUM SERPL-MCNC: 8.6 MG/DL (ref 8.6–10.2)
CHLORIDE BLD-SCNC: 102 MMOL/L (ref 98–107)
CO2: 24 MMOL/L (ref 22–29)
CREAT SERPL-MCNC: 1 MG/DL (ref 0.7–1.2)
EOSINOPHILS ABSOLUTE: 0.15 E9/L (ref 0.05–0.5)
EOSINOPHILS RELATIVE PERCENT: 2.4 % (ref 0–6)
GFR AFRICAN AMERICAN: >60
GFR NON-AFRICAN AMERICAN: >60 ML/MIN/1.73
GLUCOSE BLD-MCNC: 175 MG/DL (ref 74–99)
HBA1C MFR BLD: 7.6 % (ref 4–5.6)
HCT VFR BLD CALC: 36.7 % (ref 37–54)
HEMOGLOBIN: 11.6 G/DL (ref 12.5–16.5)
IMMATURE GRANULOCYTES #: 0.03 E9/L
IMMATURE GRANULOCYTES %: 0.5 % (ref 0–5)
LYMPHOCYTES ABSOLUTE: 2.29 E9/L (ref 1.5–4)
LYMPHOCYTES RELATIVE PERCENT: 37.3 % (ref 20–42)
MCH RBC QN AUTO: 29.7 PG (ref 26–35)
MCHC RBC AUTO-ENTMCNC: 31.6 % (ref 32–34.5)
MCV RBC AUTO: 93.9 FL (ref 80–99.9)
METER GLUCOSE: 130 MG/DL (ref 74–99)
METER GLUCOSE: 152 MG/DL (ref 74–99)
METER GLUCOSE: 174 MG/DL (ref 74–99)
METER GLUCOSE: 194 MG/DL (ref 74–99)
MONOCYTES ABSOLUTE: 0.64 E9/L (ref 0.1–0.95)
MONOCYTES RELATIVE PERCENT: 10.4 % (ref 2–12)
NEUTROPHILS ABSOLUTE: 2.99 E9/L (ref 1.8–7.3)
NEUTROPHILS RELATIVE PERCENT: 48.7 % (ref 43–80)
PDW BLD-RTO: 12.6 FL (ref 11.5–15)
PLATELET # BLD: 288 E9/L (ref 130–450)
PMV BLD AUTO: 9.8 FL (ref 7–12)
POTASSIUM REFLEX MAGNESIUM: 4.5 MMOL/L (ref 3.5–5)
RBC # BLD: 3.91 E12/L (ref 3.8–5.8)
SODIUM BLD-SCNC: 139 MMOL/L (ref 132–146)
WBC # BLD: 6.1 E9/L (ref 4.5–11.5)

## 2022-04-10 PROCEDURE — 85025 COMPLETE CBC W/AUTO DIFF WBC: CPT

## 2022-04-10 PROCEDURE — 36415 COLL VENOUS BLD VENIPUNCTURE: CPT

## 2022-04-10 PROCEDURE — 93880 EXTRACRANIAL BILAT STUDY: CPT | Performed by: RADIOLOGY

## 2022-04-10 PROCEDURE — 82962 GLUCOSE BLOOD TEST: CPT

## 2022-04-10 PROCEDURE — 93880 EXTRACRANIAL BILAT STUDY: CPT

## 2022-04-10 PROCEDURE — 83036 HEMOGLOBIN GLYCOSYLATED A1C: CPT

## 2022-04-10 PROCEDURE — 2580000003 HC RX 258: Performed by: STUDENT IN AN ORGANIZED HEALTH CARE EDUCATION/TRAINING PROGRAM

## 2022-04-10 PROCEDURE — 2060000000 HC ICU INTERMEDIATE R&B

## 2022-04-10 PROCEDURE — 6370000000 HC RX 637 (ALT 250 FOR IP): Performed by: INTERNAL MEDICINE

## 2022-04-10 PROCEDURE — 6370000000 HC RX 637 (ALT 250 FOR IP): Performed by: STUDENT IN AN ORGANIZED HEALTH CARE EDUCATION/TRAINING PROGRAM

## 2022-04-10 PROCEDURE — 99222 1ST HOSP IP/OBS MODERATE 55: CPT | Performed by: PSYCHIATRY & NEUROLOGY

## 2022-04-10 PROCEDURE — 97161 PT EVAL LOW COMPLEX 20 MIN: CPT

## 2022-04-10 PROCEDURE — 97530 THERAPEUTIC ACTIVITIES: CPT

## 2022-04-10 PROCEDURE — 80048 BASIC METABOLIC PNL TOTAL CA: CPT

## 2022-04-10 PROCEDURE — 99222 1ST HOSP IP/OBS MODERATE 55: CPT | Performed by: INTERNAL MEDICINE

## 2022-04-10 RX ORDER — ASPIRIN 81 MG/1
81 TABLET ORAL DAILY
Status: DISCONTINUED | OUTPATIENT
Start: 2022-04-10 | End: 2022-04-11 | Stop reason: HOSPADM

## 2022-04-10 RX ADMIN — Medication 10 ML: at 20:57

## 2022-04-10 RX ADMIN — ATORVASTATIN CALCIUM 40 MG: 40 TABLET, FILM COATED ORAL at 20:39

## 2022-04-10 RX ADMIN — Medication 100 MG: at 09:16

## 2022-04-10 RX ADMIN — APIXABAN 5 MG: 5 TABLET, FILM COATED ORAL at 20:39

## 2022-04-10 RX ADMIN — INSULIN LISPRO 1 UNITS: 100 INJECTION, SOLUTION INTRAVENOUS; SUBCUTANEOUS at 09:21

## 2022-04-10 RX ADMIN — INSULIN LISPRO 1 UNITS: 100 INJECTION, SOLUTION INTRAVENOUS; SUBCUTANEOUS at 20:40

## 2022-04-10 RX ADMIN — INSULIN LISPRO 1 UNITS: 100 INJECTION, SOLUTION INTRAVENOUS; SUBCUTANEOUS at 13:17

## 2022-04-10 RX ADMIN — DILTIAZEM HYDROCHLORIDE 120 MG: 120 CAPSULE, COATED, EXTENDED RELEASE ORAL at 09:16

## 2022-04-10 RX ADMIN — APIXABAN 5 MG: 5 TABLET, FILM COATED ORAL at 09:15

## 2022-04-10 RX ADMIN — Medication 10 ML: at 09:15

## 2022-04-10 RX ADMIN — ASPIRIN 81 MG: 81 TABLET, COATED ORAL at 09:15

## 2022-04-10 RX ADMIN — TAMSULOSIN HYDROCHLORIDE 0.4 MG: 0.4 CAPSULE ORAL at 09:14

## 2022-04-10 ASSESSMENT — PAIN SCALES - GENERAL: PAINLEVEL_OUTOF10: 0

## 2022-04-10 NOTE — H&P
[facility]  Internal Medicine Residency Program  History and Physical    Patient:  Tanja Fox 73 y.o. male MRN: [id number]     Date of Service: 4/9/2022    Hospital Day: 1      Chief complaint: had concerns including Dizziness (episodes of confusion, headache, A/O x4,lightheaded, takes elliquis, hx DM, hx TIA, \"umbrella filter in heart\"). History of Present Illness   The patient is a 73 y.o. male with a past medical history of home, NIDDM, ischemic stroke in 2018, PFO s/p closure in 2/14/2019, LATISHA, chronic back pain who presents to the ED today for concerns of dizziness, confusion, headache. Patient states that this morning around 11 AM he was in his car and when he tried to move his right hand to place the key into the ignition he could not. He states that his right arm started to shake and move laterally before moving towards the admission. Patient was unable to put the key to the admission at this time. Patient then tried to call his son in the house on the phone but could not dial his son's phone number. Patient then subsequently honked his horn in the car to get his thoughts attention in which his son came out. Patient states that during this episode his \"hands would not do what I wanted. \"  Patient's son subsequently drove him to the ED. Patient states that he feels this episode lasted about 25-30 minutes and by the time he arrived in the ED parking lot his symptoms have fully resolved. Patient stated that he liberated presented to the ED or not but due to the fact that this felt similar to his previous stroke in 2018 patient decided to enter the ED. In the ED vitals overall stable aside from elevated BP: 161/80. Labs significant for glucose: 266, troponin: 12.  UA was done which was negative. CXR negative for any acute processes. CT head without contrast showed no acute intracranial hemorrhage or edema.   In the ED patient states that he feels well and that he feels well enough to go home. Patient had an episode in the ED in which he felt frustrated about his care and attempted to leave AMA. Patient was found in waiting room and after speaking with patient he states willingness to come back to the ED. Patient admitted for further management      Past Medical History:      Diagnosis Date    Arthritis     Back pain 2005    injured back at work. on disability, CHRONIC , HERNIATED LUMBAR, H/O EPIDURAL INJECTIONS    Cerebral artery occlusion with cerebral infarction ([facility] [address]     6-24-18 - trouble talking, stutters right hand weak     Depression     pt states he s doing well at this time.  off his meds    Diabetes mellitus ([address]     Hx of blood clots     Hyperlipidemia     controlled     PFO (patent foramen ovale)     S/P patent foramen ovale closure     Sleep apnea     ordered cpap, but pt does not use it    Testicular mass     LEFT, removed surgically 2015        Past Surgical History:        Procedure Laterality Date    ACHILLES TENDON SURGERY  1975    left foot, [place], [place]    COLONOSCOPY  1990s    multiple polyps (x 3), [facility]    COLONOSCOPY  7/28/2015    incomplete colonoscopy to mid ascending colon, BE xray ordered, Dr. Alok Astudillo, [address]      right eye sty removed    [facility]ace   5/1/2002    Dr. Alok Astudillo, [address]  10/24/2012    left hydrocelectomy, Dr. Shabana Capps, [place]   [address]    right knee (pathology unknown), [facility]. [address] BLOCK  2/24/14    bilateral transforaminal nerve block lumbar #1    NERVE BLOCK  3/10/14    bilateral, transforaminal nerve block #2    OTHER SURGICAL HISTORY  6/19/2013    excisional dermal cyst face, Dr. Alok Astuidllo, [address] HISTORY  02/14/2019    Dr Keith Castro - PFO Closure - Amplatzer Occluder 35mm    TESTICLE REMOVAL  6/19/2013    left testicle, Dr. Shabana Capps, [facility]    TONSILLECTOMY  1950s    TOOTH EXTRACTION      full    TRANSESOPHAGEAL ECHOCARDIOGRAM  10/01/2018 Medications Prior to Admission:    Prior to Admission medications    Medication Sig Start Date End Date Taking? Authorizing Provider   naloxone [facility]) 4 MG/0.1ML LIQD nasal spray as directed 3/1/22   Historical Provider, MD   nicotine polacrilex (NICORETTE) 2 MG gum Take 1 each by mouth as needed for Smoking cessation Maximum dose: 24 pieces/24 hours. Stop chewing and park the piece of Nicorette between your cheek/ gums. After about a minute, when the tingling is almost gone, start chewing again. Repeat this process until the tingle is gone (about 30 minutes). 3/29/22   Buck Hastings MD   metFORMIN (GLUCOPHAGE) 1000 MG tablet Take 1 tablet by mouth 2 times daily (with meals) 3/29/22   Buck Hastings MD   oxyCODONE (OXYCONTIN) 60 MG T12A extended release tablet 1 tablet 3/1/22   Historical Provider, MD   oxyCODONE (OXYCONTIN) 80 MG extended release tablet 1 tablet 3/1/22   Historical Provider, MD   apixaban (ELIQUIS) 5 MG TABS tablet Take 1 tablet by mouth 2 times daily 2/9/22   Wm Irby MD   atorvastatin (LIPITOR) 40 MG tablet Take 0.5 tablets by mouth nightly 2/9/22   Wm Irby MD   meclizine (ANTIVERT) 25 MG tablet Take 1 tablet by mouth 2 times daily as needed for Dizziness 2/9/22   Wm Irby MD   tamsulosin [facility]) 0.4 MG capsule Take 1 capsule by mouth daily 12/8/21   Earlis Duverney, MD   dilTIAZem (CARDIZEM CD) 120 MG extended release capsule Take 1 capsule by mouth daily 12/8/21   Earlis Duverney, MD   vitamin B-1 (THIAMINE) 100 MG tablet Take 1 tablet by mouth daily 12/8/21   Earlis Duverney, MD   lidocaine (XYLOCAINE) 5 % ointment  11/2/21   Historical Provider, MD   blood glucose monitor strips 2 times daily.  8/23/21   Seamus Rose MD   dicyclomine (BENTYL) 10 MG capsule Take 1 capsule by mouth every 6 hours as needed (Bowel spasms) 3/23/21 12/8/21  Deb Castrejon MD   blood glucose monitor kit and supplies Test 2 times a day before breakfast and supper 5/27/20   Fidencio Matthew MD   oxyCODONE (ROXICODONE) 5 MG immediate release tablet Take 5 mg by mouth every 6 hours as needed. [facility]    Historical Provider, MD       Allergies:  Lisinopril and Toradol [ketorolac tromethamine]    Social History:   TOBACCO:   reports that he has been smoking cigarettes and cigars. He started smoking about 18 years ago. He has a 6.80 pack-year smoking history. He has never used smokeless tobacco.  ETOH:   reports current alcohol use. Family History:       Problem Relation Age of Onset    Diabetes Father     Diabetes Maternal Grandmother        REVIEW OF SYSTEMS:    · Constitutional: No fever, no chills, no change in weight; good appetite  · HEENT: No blurred vision, no ear problems, no sore throat, no rhinorrhea. · Respiratory: No cough, no sputum production, no pleuritic chest pain, no shortness of breath  · Cardiology: No angina, no dyspnea on exertion, no paroxysmal nocturnal dyspnea, no orthopnea, no palpitation, no leg swelling. · Gastroenterology: No dysphagia, no reflux; no abdominal pain, no nausea or vomiting; no constipation or diarrhea.  No hematochezia   · Genitourinary: No dysuria, no frequency, hesitancy; no hematuria  · Musculoskeletal: no joint pain, no myalgia, no change in range of movement  · Neurology: no focal weakness in extremities, no slurred speech, no double vision, no tingling or numbness sensation  · Endocrinology: no temperature intolerance, no polyphagia, polydipsia or polyuria  · Hematology: no increased bleeding, no bruising, no lymphadenopathy  · Skin: no skin changes noticed by patient  · Psychology: no depressed mood, no suicidal ideation    Physical Exam   · Vitals: /64   Pulse 58   Temp 97.6 °F (36.4 °C) (Temporal)   Resp 18   Wt (!) 355 lb (161 kg)   SpO2 100%   BMI 45.58 kg/m²     · General Appearance: alert and oriented to person, place and time, well developed and well- nourished, in no acute distress  · Skin: warm and dry, no rash or erythema  · Head: normocephalic and atraumatic  · Eyes: pupils equal, round, and reactive to light, extraocular eye movements intact, conjunctivae normal  · ENT: tympanic membrane, external ear and ear canal normal bilaterally, nose without deformity, nasal mucosa and turbinates normal without polyps  · Neck: supple and non-tender without mass, no thyromegaly or thyroid nodules, no cervical lymphadenopathy  · Pulmonary/Chest: clear to auscultation bilaterally- no wheezes, rales or rhonchi, normal air movement, no respiratory distress  · Cardiovascular: normal rate, regular rhythm, normal S1 and S2, no murmurs, rubs, clicks, or gallops, distal pulses intact, no carotid bruits  · Abdomen: soft, non-tender, non-distended, normal bowel sounds, no masses or organomegaly  · Extremities: no cyanosis, clubbing or edema. · Musculoskeletal: normal range of motion, no joint swelling, deformity or tenderness. 5/5 strength appreciated in bilateral upper and lower extremities. · Neurologic: reflexes normal and symmetric, no cranial nerve deficit, gait, coordination and speech normal. NIHSS: 1 when assessed  Labs and Imaging Studies   Basic Labs  Recent Labs     04/09/22  1239 04/09/22  1249     --    K 4.6  --    CL 99  --    CO2 24  --    BUN 15  --    CREATININE 1.0  --    GLUCOSE 266* 248   CALCIUM 8.8  --        Recent Labs     04/09/22  1239   WBC 6.2   RBC 4.15   HGB 12.0*   HCT 38.1   MCV 91.8   MCH 28.9   MCHC 31.5*   RDW 12.5      MPV 9.3         Imaging Studies:  CT Head WO Contrast   Final Result   1. No acute intracranial hemorrhage or edema. 2. Redemonstration of multiple chronic areas of stroke involving bilateral   cerebellar hemispheres as well as stable chronic lacunar stroke involving   head of the caudate nucleus on the right. XR CHEST PORTABLE   Final Result   No acute cardiopulmonary process.          US CAROTID ARTERY BILATERAL    (Results Pending)   MRI BRAIN WO CONTRAST    (Results Pending)            Resident's Assessment and Plan     Assessment:    1. Concerns for TIA  2. Hx of A. Fib - on Eliquis at home  3. Hx of NIDDM  4. Hx of Ischemic stroke in 2018  5. Hx of PFO s/p closure in 2/14/19  6. Hx of LATISHA  7.  Hx of Chronic Back Pain - has had multiple steroid inj in past    Plan  · Neurology consulted, appreciate recs  · CBC, BMP daily  · F/u lipid panel, UDS, SDS  · POCT glucose ACHS -currently on LDSS  · F/u MRI brain w/o contrast  · F/u US carotid A.   · F/u Echo  · Monitor respiratory status, patient has history of LATISHA on BiPAP at night and with naps  · Continue home meds  · Increase patient's home dose of Lipitor 20 mg -> 40mg      PT/OT evaluation:   DVT prophylaxis/ GI prophylaxis: Eliquis/Diet  Disposition: admit to Sarbjit Salazar MD, PGY-1  Attending physician: Dr. Desiree Clrak

## 2022-04-10 NOTE — PROGRESS NOTES
[facility]  Internal Medicine Residency / House Medicine Service      Initial H and P    Attending Physician Statement  I have discussed the case, including pertinent history and exam findings with the resident and the team. I have reviewed all significant past medical history, surgical history, social history, family history, allergies, and home medications independently. I have seen and examined the patient and the key elements of the encounter have been performed by me. I agree with the assessment, plan and orders as documented by the resident. Case Discussed During AM Rounds    Admitted on 4/9 with acute TIA symptoms- resolved prior to ED evaluation   Hx of stroke with underlying AFIB and PFO s/p closure    No acute findings of CT head   Prior findings noted on both CT head and prior neuroimaging with comparison of remote MRI    Neuro checks ongoing   Neurology consultation   Maintaining anticoagulation at this time- did not load antiplatelet therapy due to bleeding risk and unclear etiology of presentation    Patient states he was not taking his home aspirin    Continues to deny any new symptoms    No new focal neuro deficits on exam     ? TIA    Neuro consultation    Continue anticoagulation and ASA    Neuro checks   MRI brain planned   Carotid U/S    ECHO to assess PFO closure     Hx of stroke in the presence of AFIB and PFO s/p closure    As above     Atrial Fib   Anticoagulation continued     Remainder of medical problems as per resident note.       Danica Pillai MD, [address]   Internal Medicine Residency Faculty

## 2022-04-10 NOTE — PROGRESS NOTES
Date     04/10/2022    K 4.5 04/10/2022     04/10/2022    CO2 24 04/10/2022    BUN 13 04/10/2022    LABALBU 3.8 04/09/2022    LABALBU 4.3 03/04/2011    CREATININE 1.0 04/10/2022    CALCIUM 8.6 04/10/2022    GFRAA >60 04/10/2022    LABGLOM >60 04/10/2022    GLUCOSE 175 04/10/2022    GLUCOSE 138 08/29/2011       CT Head WO Contrast   Final Result   1. No acute intracranial hemorrhage or edema. 2. Redemonstration of multiple chronic areas of stroke involving bilateral   cerebellar hemispheres as well as stable chronic lacunar stroke involving   head of the caudate nucleus on the right. XR CHEST PORTABLE   Final Result   No acute cardiopulmonary process. US CAROTID ARTERY BILATERAL    (Results Pending)   MRI BRAIN W WO CONTRAST    (Results Pending)        Resident's Assessment and Plan     Assessment:    1. Concerns for TIA  2. Hx of A. Fib, on Eliquis at home  3. Hx of NIDDM - HbA1c 7.6  4. Hx of Ischemic stroke in 2018  5. Hx of PFO s/p closure in 2/14/2019  6. Hx of LATISHA, on BiPAP at home  7.  Hx of chronic back pain    Plan:  · Neurology following, appreciate recs  · Lipid panel cholesterol 120, LDL 52, triglycerides 85  · Continue aspirin and eliquis, atorvastatin 40 mg  · POCT glucose ACHS, controlled on LDSS  · F/u MRI brain w/o contrast, US carotid, echo  · Monitor respiratory status, BiPAP at night  · Continue home meds    PT/OT evaluation: following  DVT prophylaxis/ GI prophylaxis: eliquis/diet  Disposition: continue current care    Michelle Covington MD, PGY-1  Attending physician: Dr. Vanesa Holman

## 2022-04-10 NOTE — PROGRESS NOTES
Unable to perform mri, the patient exceeds our table limit. Spoke with the Rn and informed her, he will be scheduled with our mobile mri tomorrow.

## 2022-04-10 NOTE — PROGRESS NOTES
risk  [] Endurance Training to improve activity tolerance during functional mobility   [x] Transfer Training to improve safety and independence with all functional transfers   [x] Gait Training to improve gait mechanics, endurance and assess need for appropriate assistive device  [x] Stair Training in preparation for safe discharge home and/or into the community   [] Positioning to prevent skin breakdown and contractures  [x] Safety and Education Training   [] Patient/Caregiver Education   [] HEP  [] Other     PT long term treatment goals are located in above grid    Frequency of treatments: 2-5x/week x 2-3 days. Time in  1130  Time out  1150    Total Treatment Time  10 minutes     Evaluation Time includes thorough review of current medical information, gathering information on past medical history/social history and prior level of function, completion of standardized testing/informal observation of tasks, assessment of data and education on plan of care and goals.     CPT codes:  [x] Low Complexity PT evaluation 71475  [] Moderate Complexity PT evaluation 05327  [] High Complexity PT evaluation 30392  [] PT Re-evaluation 36108  [] Gait training 46481 0 minutes  [] Manual therapy 86733 0 minutes  [x] Therapeutic activities 14308 10 minutes  [] Therapeutic exercises 08605 0 minutes  [] Neuromuscular reeducation 79187 0 minutes     Edvin Howell, PT, DPT  [id number]

## 2022-04-10 NOTE — CONSULTS
[address]  Neurology Consult    Date:  4/10/2022  Patient Name:  Delano Tapia  YOB: 1950  MRN: [id number]     PCP:  Swathi Bryant MD   Referring:  No ref. provider found      Chief Complaint: couldn't move right    History obtained from: patient, chart    Assessment  Delano Tapia is a 73 y.o. male admitted following a spell of inability to move. He does have some diminished sensation over the right face, otherwise, his exam is fairly unrevealing. Given the timeline stroke, TIA, and seizure are all considered. Plan  · MRI megan w/o contrast  · Consider EEG if above negative  · Agree with addition of ASA 81   · Statin therapy, goal LDL<70        History of Present Illness:  Delano Tapia is a 73 y.o. right handed male presenting for evaluation of possible stroke. The patient states the day of admission he had been sitting in his car when he had an episode where he \"just couldn't move\" his hands right. He also felt his \"thoughts weren't right,\" and he also noted he couldn't use his phone correctly. There was also some mild right forehead discomfort. He leaned against the car horn and his grandson came out to help him. He also noted some discomfort, but \"not a headache,\" over the right forehead. He took some ASA as well. On March 7 he states he had a steroid shot for low back pain. He has some degree of weakness in his left leg from his lumbar disc disease. He has a history of concussions from football.            Review of Systems:  Constitutional  · Weight loss: No  · Fever: No    Eyes  · Double Vision: No  · Visions loss: No    Ears, Nose, Mouth, and Throat  · Difficulty swallowing: No    Cardiovascular  · Chest Pain: No    Respiratory  · Shortness of Breath: No    Gastrointestinal  · Abdominal Pain: No    Genitourinary  · Difficulty with Urination: No    Integumentary  · Rash: No    Musculoskeletal  · Back Pain: Yes    Neurological  · Headaches: No  · Weakness: Yes  · Further symptoms noted in HPI    Psychiatric  · Anxiety: No  · Depression: No    Complete 10-point review of systems is negative except as noted above in my HPI      Medical History:   Past Medical History:   Diagnosis Date    Arthritis     Back pain 2005    injured back at work. on disability, CHRONIC , HERNIATED LUMBAR, H/O EPIDURAL INJECTIONS    Cerebral artery occlusion with cerebral infarction ([address]     6-24-18 - trouble talking, stutters right hand weak     Depression     pt states he s doing well at this time.  off his meds    Diabetes mellitus ([address]     Hx of blood clots     Hyperlipidemia     controlled     PFO (patent foramen ovale)     S/P patent foramen ovale closure     Sleep apnea     ordered cpap, but pt does not use it    Testicular mass     LEFT, removed surgically 2015         Surgical History:   Past Surgical History:   Procedure Laterality Date    ACHILLES TENDON SURGERY  1975    [place] foot, [place], [place]    COLONOSCOPY  1990s    multiple polyps (x 3), [facility]    COLONOSCOPY  7/28/2015    incomplete colonoscopy to mid ascending colon, BE xray ordered, Dr. Darin Tatum, [address]      right eye sty removed    Court Parikh  5/1/2002    Dr. Darin Tatum, [address]  10/24/2012    left hydrocelectomy, Dr. José Manuel Almeida Jefry, [facility]   [address]    right knee (pathology unknown), [facility]. [address] BLOCK  2/24/14    bilateral transforaminal nerve block lumbar #1    NERVE BLOCK  3/10/14    bilateral, transforaminal nerve block #2    OTHER SURGICAL HISTORY  6/19/2013    excisional dermal cyst face, Dr. Darin Tatum, [address] HISTORY  02/14/2019    Dr Samantha Hernandez - PFO Closure - Amplatzer Occluder 35mm    TESTICLE REMOVAL  6/19/2013    left testicle, Dr. José Manuel Almeida Jefry, [facility]    TONSILLECTOMY  1950s    TOOTH EXTRACTION      full    TRANSESOPHAGEAL ECHOCARDIOGRAM  10/01/2018        Family History:   Family History   Problem Relation Age of Onset    Diabetes Father     Diabetes Maternal Grandmother        Social History:  Social History     Tobacco Use    Smoking status: Current Every Day Smoker     Packs/day: 0.40     Years: 17.00     Pack years: 6.80     Types: Cigarettes, Cigars     Start date: 1/1/2004    Smokeless tobacco: Never Used    Tobacco comment:  smokes 4-5 cigarettes a day , cigars on occasion   Vaping Use    Vaping Use: Never used   Substance Use Topics    Alcohol use:  Yes     Alcohol/week: 0.0 standard drinks     Comment: rare has some whiskey at times    Drug use: Not Currently     Comment: none since age 30        Current Medications:      Current Facility-Administered Medications   Medication Dose Route Frequency Provider Last Rate Last Admin    aspirin EC tablet 81 mg  81 mg Oral Daily Alexandra L Washburn, DO   81 mg at 04/10/22 0915    apixaban (ELIQUIS) tablet 5 mg  5 mg Oral BID Lelia Maciel MD   5 mg at 04/10/22 0915    dicyclomine (BENTYL) capsule 10 mg  10 mg Oral Q6H PRN Lelia Maciel MD        dilTIAZem (CARDIZEM CD) extended release capsule 120 mg  120 mg Oral Daily Lelia Maciel MD   120 mg at 04/10/22 0547    meclizine (ANTIVERT) tablet 25 mg  25 mg Oral BID PRN Lelia Maciel MD        [facility]) capsule 0.4 mg  0.4 mg Oral Daily Lelia Maciel MD   0.4 mg at 04/10/22 6175    thiamine mononitrate tablet 100 mg  100 mg Oral Daily Lelia Maciel MD   100 mg at 04/10/22 0916    sodium chloride flush 0.9 % injection 5-40 mL  5-40 mL IntraVENous 2 times per day Lelia Maciel MD   10 mL at 04/10/22 0915    sodium chloride flush 0.9 % injection 5-40 mL  5-40 mL IntraVENous PRN Lelia Maciel MD        0.9 % sodium chloride infusion   IntraVENous PRN Lelia Maciel MD        ondansetron (ZOFRAN-ODT) disintegrating tablet 4 mg  4 mg Oral Q8H PRN Lelia Maciel MD        Or    ondansetron [facility]) injection 4 mg  4 mg IntraVENous Q6H PRN Lelia Maciel MD        polyethylene glycol (GLYCOLAX) packet 17 g  17 g Oral Daily PRN Reji Christianson MD        acetaminophen (TYLENOL) tablet 650 mg  650 mg Oral Q6H PRN Reji Christianson MD        Or    acetaminophen (TYLENOL) suppository 650 mg  650 mg Rectal Q6H PRN Reji Christianson MD        perflutren lipid microspheres (DEFINITY) injection 1.65 mg  1.5 mL IntraVENous ONCE PRN Reji Christianson MD        insulin lispro (HUMALOG) injection vial 0-6 Units  0-6 Units SubCUTAneous TID WC Reji Christianson MD   1 Units at 04/10/22 1317    insulin lispro (HUMALOG) injection vial 0-3 Units  0-3 Units SubCUTAneous Nightly Reji Christianson MD        labetalol (NORMODYNE;TRANDATE) injection 10 mg  10 mg IntraVENous Q6H PRN Reji Christianson MD        glucose (GLUTOSE) 40 % oral gel 15 g  15 g Oral PRN Reji Christianson MD        dextrose 50 % IV solution  12.5 g IntraVENous PRN Reji Christianson MD        glucagon (rDNA) injection 1 mg  1 mg IntraMUSCular PRN Reji Christianson MD        dextrose 5 % solution  100 mL/hr IntraVENous PRN Reji Christianson MD        atorvastatin (LIPITOR) tablet 40 mg  40 mg Oral Nightly Reji Christianson MD         Facility-Administered Medications Ordered in Other Encounters   Medication Dose Route Frequency Provider Last Rate Last Admin    promethazine (PHENERGAN) tablet 12.5 mg  12.5 mg Oral Q6H PRN Jeanette Contreras MD        Or    ondansetron [facility]) injection 4 mg  4 mg IntraVENous Q6H PRN Jeanette Contreras MD        atorvastatin (LIPITOR) tablet 80 mg  80 mg Oral Nightly Jeanette Contreras MD        sodium chloride flush 0.9 % injection 10 mL  10 mL IntraVENous 2 times per day Jeanette Contreras MD        sodium chloride flush 0.9 % injection 10 mL  10 mL IntraVENous PRN Jeanette Contreras MD        acetaminophen (TYLENOL) tablet 650 mg  650 mg Oral Q4H PRN Jeanette Contreras MD            Allergies:       Allergies   Allergen Reactions    Lisinopril Other (See Comments)     Cough    Toradol [Ketorolac Tromethamine] Other (See Comments)     hallucination        Physical Examination  Vitals   Vitals:    04/09/22 2045 04/10/22 0730 04/10/22 7824 04/10/22 0917   BP: 114/64 130/65     Pulse: 58 58 64 70   Resp: 18 18     Temp: 97.6 °F (36.4 °C) 98.4 °F (36.9 °C)     TempSrc: Temporal Temporal     SpO2: 100% 96%     Weight:            General: Patient appears in no acute distress. Awake and oriented x 3. HEENT: Normocephalic, atraumatic  Chest: Clear to auscultation bilaterally  Heart: No murmurs appreciated  Extremities/Peripheral vascular: No edema/swelling noted. No cold limbs noted. Neurologic Examination    Mental Status  Alert, and oriented to person, place and time. Speech is fluent with intact comprehension. No evidence of memory impairment. Attention and concentration appeared normal.     Cranial Nerves  II. Visual fields full to confrontation bilaterally. Fundoscopic exam: Discs not well visualized  III, IV, VI: Pupils equally round and reactive to light, 3 to 2 mm bilaterally. EOMs: full, no nystagmus. V. Facial sensation: diminished to pinprick over right face  VII: Facial movements symmetric and strong  VIII: Hearing intact to voice  IX,X: Palate elevates symmetrically. No dysarthria  XI: Sternocleidomastoid and trapezius 5/5 bilaterally   XII: Tongue is midline    Motor     Right Left   Right Left   Deltoid 5 5  Hip Flexion 5 4   Biceps      5  5  Knee Extension 5 5   Triceps 5 5  Knee Flexion 5 5   Handgrip 5 5  Ankle Dorsiflexion 5 5       Ankle Plantarflexion 5 5     Tone: Normal in all four limbs    Bulk: Normal in all four limbs with no evidence of atrophy    Pronator drift: absent bilaterally    Sensation  · Light Touch: Intact distally in all four limbs  · Vibration: Intact distally in all four limbs    Reflexes     Right Left   Biceps 2 2   Brachioradialis 2 2   Patellar 1 1   Achilles 0 0   ankle clonus none none     Toes silent bilaterally.     Coordination  Rapid alternating movements normal in bilateral upper extremities  Finger to nose testing normal bilaterally    Gait  Deferred for safety/fall consideration      Labs  Recent Labs     04/09/22  1239 04/09/22  1239 04/09/22  1249 04/09/22  2130 04/10/22  0554      < >  --   --  139   K 4.6   < >  --   --  4.5   CL 99   < >  --   --  102   CO2 24   < >  --   --  24   BUN 15   < >  --   --  13   CREATININE 1.0   < >  --   --  1.0   GLUCOSE 266*   < >   < >  --  175*   CALCIUM 8.8   < >  --   --  8.6   PROT 7.5  --   --   --   --    LABALBU 3.8  --   --   --   --    BILITOT 0.3  --   --   --   --    ALKPHOS 89  --   --   --   --    AST 14  --   --   --   --    ALT 13  --   --   --   --    WBC 6.2   < >  --   --  6.1   RBC 4.15   < >  --   --  3.91   HGB 12.0*   < >  --   --  11.6*   HCT 38.1   < >  --   --  36.7*   MCV 91.8   < >  --   --  93.9   MCH 28.9   < >  --   --  29.7   MCHC 31.5*   < >  --   --  31.6*   RDW 12.5   < >  --   --  12.6      < >  --   --  288   MPV 9.3   < >  --   --  9.8   HDL  --   --   --  51  --    LDLCALC  --   --   --  52  --    LABA1C  --   --   --   --  7.6*    < > = values in this interval not displayed. Imaging  CT Head WO Contrast   Final Result   1. No acute intracranial hemorrhage or edema. 2. Redemonstration of multiple chronic areas of stroke involving bilateral   cerebellar hemispheres as well as stable chronic lacunar stroke involving   head of the caudate nucleus on the right. XR CHEST PORTABLE   Final Result   No acute cardiopulmonary process.          US CAROTID ARTERY BILATERAL    (Results Pending)   MRI BRAIN W WO CONTRAST    (Results Pending)           Electronically signed by Rony Davila DO on 4/10/2022 at 2:00 PM

## 2022-04-11 ENCOUNTER — APPOINTMENT (OUTPATIENT)
Dept: MRI IMAGING | Age: 72
DRG: 101 | End: 2022-04-11
Payer: MEDICARE

## 2022-04-11 VITALS
TEMPERATURE: 98 F | SYSTOLIC BLOOD PRESSURE: 130 MMHG | HEART RATE: 64 BPM | DIASTOLIC BLOOD PRESSURE: 67 MMHG | RESPIRATION RATE: 18 BRPM | BODY MASS INDEX: 45.58 KG/M2 | WEIGHT: 315 LBS | OXYGEN SATURATION: 99 %

## 2022-04-11 LAB
ANION GAP SERPL CALCULATED.3IONS-SCNC: 10 MMOL/L (ref 7–16)
BASOPHILS ABSOLUTE: 0.04 E9/L (ref 0–0.2)
BASOPHILS RELATIVE PERCENT: 0.6 % (ref 0–2)
BUN BLDV-MCNC: 16 MG/DL (ref 6–23)
CALCIUM SERPL-MCNC: 8.8 MG/DL (ref 8.6–10.2)
CHLORIDE BLD-SCNC: 103 MMOL/L (ref 98–107)
CO2: 24 MMOL/L (ref 22–29)
CREAT SERPL-MCNC: 1 MG/DL (ref 0.7–1.2)
EKG ATRIAL RATE: 59 BPM
EKG P AXIS: 85 DEGREES
EKG P-R INTERVAL: 186 MS
EKG Q-T INTERVAL: 434 MS
EKG QRS DURATION: 108 MS
EKG QTC CALCULATION (BAZETT): 429 MS
EKG R AXIS: -28 DEGREES
EKG T AXIS: 33 DEGREES
EKG VENTRICULAR RATE: 59 BPM
EOSINOPHILS ABSOLUTE: 0.13 E9/L (ref 0.05–0.5)
EOSINOPHILS RELATIVE PERCENT: 2.1 % (ref 0–6)
GFR AFRICAN AMERICAN: >60
GFR NON-AFRICAN AMERICAN: >60 ML/MIN/1.73
GLUCOSE BLD-MCNC: 161 MG/DL (ref 74–99)
HCT VFR BLD CALC: 37.2 % (ref 37–54)
HEMOGLOBIN: 11.8 G/DL (ref 12.5–16.5)
IMMATURE GRANULOCYTES #: 0.02 E9/L
IMMATURE GRANULOCYTES %: 0.3 % (ref 0–5)
LV EF: 63 %
LVEF MODALITY: NORMAL
LYMPHOCYTES ABSOLUTE: 2.46 E9/L (ref 1.5–4)
LYMPHOCYTES RELATIVE PERCENT: 39.8 % (ref 20–42)
MCH RBC QN AUTO: 29.1 PG (ref 26–35)
MCHC RBC AUTO-ENTMCNC: 31.7 % (ref 32–34.5)
MCV RBC AUTO: 91.9 FL (ref 80–99.9)
METER GLUCOSE: 133 MG/DL (ref 74–99)
METER GLUCOSE: 155 MG/DL (ref 74–99)
METER GLUCOSE: 159 MG/DL (ref 74–99)
MONOCYTES ABSOLUTE: 0.67 E9/L (ref 0.1–0.95)
MONOCYTES RELATIVE PERCENT: 10.8 % (ref 2–12)
NEUTROPHILS ABSOLUTE: 2.86 E9/L (ref 1.8–7.3)
NEUTROPHILS RELATIVE PERCENT: 46.4 % (ref 43–80)
PDW BLD-RTO: 12.4 FL (ref 11.5–15)
PLATELET # BLD: 287 E9/L (ref 130–450)
PMV BLD AUTO: 9 FL (ref 7–12)
POTASSIUM REFLEX MAGNESIUM: 4.2 MMOL/L (ref 3.5–5)
RBC # BLD: 4.05 E12/L (ref 3.8–5.8)
SODIUM BLD-SCNC: 137 MMOL/L (ref 132–146)
WBC # BLD: 6.2 E9/L (ref 4.5–11.5)

## 2022-04-11 PROCEDURE — 93010 ELECTROCARDIOGRAM REPORT: CPT | Performed by: INTERNAL MEDICINE

## 2022-04-11 PROCEDURE — 85025 COMPLETE CBC W/AUTO DIFF WBC: CPT

## 2022-04-11 PROCEDURE — 6370000000 HC RX 637 (ALT 250 FOR IP): Performed by: STUDENT IN AN ORGANIZED HEALTH CARE EDUCATION/TRAINING PROGRAM

## 2022-04-11 PROCEDURE — 80048 BASIC METABOLIC PNL TOTAL CA: CPT

## 2022-04-11 PROCEDURE — 99238 HOSP IP/OBS DSCHRG MGMT 30/<: CPT | Performed by: INTERNAL MEDICINE

## 2022-04-11 PROCEDURE — 97165 OT EVAL LOW COMPLEX 30 MIN: CPT

## 2022-04-11 PROCEDURE — 6370000000 HC RX 637 (ALT 250 FOR IP): Performed by: INTERNAL MEDICINE

## 2022-04-11 PROCEDURE — 70551 MRI BRAIN STEM W/O DYE: CPT

## 2022-04-11 PROCEDURE — 99233 SBSQ HOSP IP/OBS HIGH 50: CPT | Performed by: CLINICAL NURSE SPECIALIST

## 2022-04-11 PROCEDURE — 82962 GLUCOSE BLOOD TEST: CPT

## 2022-04-11 PROCEDURE — 93306 TTE W/DOPPLER COMPLETE: CPT

## 2022-04-11 PROCEDURE — 36415 COLL VENOUS BLD VENIPUNCTURE: CPT

## 2022-04-11 RX ORDER — OXYCODONE HCL 20 MG/1
80 TABLET, FILM COATED, EXTENDED RELEASE ORAL DAILY
Status: DISCONTINUED | OUTPATIENT
Start: 2022-04-12 | End: 2022-04-11 | Stop reason: HOSPADM

## 2022-04-11 RX ORDER — OXYCODONE HCL 20 MG/1
60 TABLET, FILM COATED, EXTENDED RELEASE ORAL NIGHTLY
Status: DISCONTINUED | OUTPATIENT
Start: 2022-04-11 | End: 2022-04-11 | Stop reason: HOSPADM

## 2022-04-11 RX ORDER — ASPIRIN 81 MG/1
81 TABLET ORAL DAILY
Qty: 30 TABLET | Refills: 3 | Status: SHIPPED | OUTPATIENT
Start: 2022-04-12 | End: 2022-08-15

## 2022-04-11 RX ORDER — OXYCODONE HYDROCHLORIDE 5 MG/1
5 TABLET ORAL ONCE
Status: DISCONTINUED | OUTPATIENT
Start: 2022-04-11 | End: 2022-04-11 | Stop reason: HOSPADM

## 2022-04-11 RX ORDER — OXYCODONE HCL 40 MG/1
80 TABLET, FILM COATED, EXTENDED RELEASE ORAL EVERY 12 HOURS SCHEDULED
Status: DISCONTINUED | OUTPATIENT
Start: 2022-04-11 | End: 2022-04-11 | Stop reason: DRUGHIGH

## 2022-04-11 RX ADMIN — APIXABAN 5 MG: 5 TABLET, FILM COATED ORAL at 09:55

## 2022-04-11 RX ADMIN — ASPIRIN 81 MG: 81 TABLET, COATED ORAL at 09:55

## 2022-04-11 RX ADMIN — Medication 100 MG: at 09:55

## 2022-04-11 RX ADMIN — TAMSULOSIN HYDROCHLORIDE 0.4 MG: 0.4 CAPSULE ORAL at 09:55

## 2022-04-11 RX ADMIN — INSULIN LISPRO 1 UNITS: 100 INJECTION, SOLUTION INTRAVENOUS; SUBCUTANEOUS at 13:53

## 2022-04-11 RX ADMIN — INSULIN LISPRO 1 UNITS: 100 INJECTION, SOLUTION INTRAVENOUS; SUBCUTANEOUS at 09:56

## 2022-04-11 RX ADMIN — DILTIAZEM HYDROCHLORIDE 120 MG: 120 CAPSULE, COATED, EXTENDED RELEASE ORAL at 09:55

## 2022-04-11 ASSESSMENT — PAIN DESCRIPTION - DESCRIPTORS: DESCRIPTORS: ACHING;DISCOMFORT

## 2022-04-11 ASSESSMENT — PAIN SCALES - GENERAL: PAINLEVEL_OUTOF10: 7

## 2022-04-11 ASSESSMENT — PAIN DESCRIPTION - PAIN TYPE: TYPE: CHRONIC PAIN

## 2022-04-11 ASSESSMENT — PAIN DESCRIPTION - PROGRESSION
CLINICAL_PROGRESSION: NOT CHANGED

## 2022-04-11 ASSESSMENT — PAIN - FUNCTIONAL ASSESSMENT: PAIN_FUNCTIONAL_ASSESSMENT: ACTIVITIES ARE NOT PREVENTED

## 2022-04-11 ASSESSMENT — PAIN DESCRIPTION - ONSET: ONSET: ON-GOING

## 2022-04-11 ASSESSMENT — PAIN DESCRIPTION - FREQUENCY: FREQUENCY: CONTINUOUS

## 2022-04-11 ASSESSMENT — PAIN DESCRIPTION - LOCATION: LOCATION: BACK

## 2022-04-11 ASSESSMENT — PAIN DESCRIPTION - ORIENTATION: ORIENTATION: LEFT;LOWER

## 2022-04-11 NOTE — PROGRESS NOTES
[address] [place] [address]       RCCZ:                                                  Patient Name: Uma Valadez  MRN: [id number]  : 1950  Room: [address] [place] [address]    Evaluating OT: Ruben Strickland OTR/L #[id number]    Referring Provider[de-identified] Juan Jose Molina MD     Specific Provider Orders/Date: OT evaluation and treatment 4/10/22    Diagnosis:  TIA (transient ischemic attack) [G45.9]      Pertinent Medical History:  has a past medical history of Arthritis, Back pain, Cerebral artery occlusion with cerebral infarction ([facility] Depression, Diabetes mellitus ([facility] Hx of blood clots, Hyperlipidemia, PFO (patent foramen ovale), S/P patent foramen ovale closure, Sleep apnea, and Testicular mass.        Precautions:  Fall Risk,     Assessment of current deficits   [x] Functional mobility   [x]ADLs  [x] Strength               []Cognition   [x] Functional transfers   [] IADLs         [x] Safety Awareness   [x]Endurance   [] Fine Coordination              [x] Balance      [] Vision/perception   []Sensation    []Gross Motor Coordination  [] ROM  [] Delirium                   [] Motor Control     OT PLAN OF CARE   OT POC based on physician orders, patient diagnosis and results of clinical assessment    Frequency/Duration 1-3 days/wk for 2 weeks PRN   Specific OT Treatment to include:   * Instruction/training on adapted ADL techniques and AE recommendations to increase functional independence within precautions       * Functional transfer/mobility training/DME recommendations for increased independence, safety, and fall prevention  * Patient/Family education to increase follow through with safety techniques and functional independence  * Therapeutic activities to facilitate/challenge dynamic balance, stand tolerance for increased safety and independence with ADLs    Modified Alfredo Scale   Score Description  0             No symptoms  1             No significant disability despite symptoms  2             Slight disability; able to look after own affairs  3             Moderate disability; able to ambulate without assist/ requires assist with ADLs  4             Moderate/Severe disability;requires assist to ambulate/assist with ADLs  5             Severe disability;bedridden/incontinent   6               Score:   2    Recommended Adaptive Equipment:  TBD     Home Living:  Pt lives with wife in a 2 story with 4 step(s) to enter and 2 rail(s); bed/bath on 2nd   Bathroom setup: tub,    Equipment owned: ww, cane    Prior Level of Function: Modified [place]  with ADLs , [place]  with IADLs; ambulated with ww or cane    Pain Level: not rated chronic back pain    Cognition: A&O: /4;   Follows multi step directions: good    Memory:  good    Sequencing:  good    Problem solving:  good    Judgement/safety:  good     Functional Assessment:   AM-PAC Daily Activity Raw Score:      Initial Eval Status  Date: 22 Treatment Status  Date: STG=LTG  Time frame: 10-14 days   Feeding Independent   Independent    Grooming Independent   Independent    UB Dressing Modified [place]   Modified [place]    LB Dressing Minimal Assist   For socks however pt stated he does not wear socks at home, only slip on shoes  Modified [place]    Bathing Stand by Assist  Modified [place]    Toileting Modified [place]   Modified [place]    Bed Mobility  Supine to sit: Independent   Sit to supine: Independent   Supine to sit:  Independent   Sit to supine: Independent    Functional Transfers Sit to stand: Supervision   Stand to sit: Supervision   Stand pivot: Supervision   Modified [place]    Functional Mobility Supervision with no AD    Mod indep   Balance Sitting: indep     Standing: supervision  Sitting: indep      Standing: mod indep   Activity Tolerance Fair  good Visual/  Perceptual Glasses: yes          BUE  ROM/Strength/  Fine motor Coordination Hand dominance: R    RUE: ROM WFL     Strength: grossly 4/5      Strength:  WFL     Coordination:   WFL    LUE: ROM  WFL     Strength: grossly 4/5      Strength:  WFL     Coordination: WFL       Hearing: WFL   Sensation:  No c/o numbness or tingling   Tone:  WFL   Edema:  None noted    Comments:   RN cleared patient for OT. Upon arrival, patient in bed. Patient presents with slight  decreased  ADLs,  functional mobility but reported that he feels he is close to baseline level . At end of session, patient sitting EOB with call light and phone within reach, all lines and tubes intact. Pt would benefit from continued skilled OT to increase safety and independence with completion of ADL tasks and functional mobility for improved quality of life. Rehab Potential: Good  for established goals     Patient / Family Goal:  Not stated    Patient and/or family were instructed on functional diagnosis, prognosis/goals and OT plan of care. Demonstrated good understanding. Eval Complexity: Low    Time In: 3:02  Time Out: 3:15  Total Treatment Time: 0 minutes    Min Units   OT Eval Low 33243  x     OT Eval Medium 18285      OT Eval High 27644       OT Re-Eval [id number]       Therapeutic Ex 90096       Therapeutic Activities 39234      ADL/Self Care 75792      Orthotic Management 03812       Neuro Re-Ed 79539       Non-Billable Time          Evaluation Time includes thorough review of current medical information, gathering information on past medical history/social history and prior level of function, completion of standardized testing/informal observation of tasks, assessment of data and education on plan of care and goals.             [place], [place] #[id number]

## 2022-04-11 NOTE — DISCHARGE SUMMARY
[address]  Discharge Summary    PCP: Beth Waters MD    Admit Date:4/9/2022  Discharge Date: 4/11/2022    Admission Diagnosis:   1. Concerns for TIA  2. Hx of A. Fib - on Eliquis at home  3. Hx of NIDDM  4. Hx of Ischemic stroke in 2018  5. Hx of PFO s/p closure in 2/14/19  6. Hx of LATISHA  7. Hx of Chronic Back Pain - has had multiple steroid inj in past    Discharge Diagnosis:  8. Concerns for TIA  9. Hx of A. Fib - on Eliquis at home  10. Hx of NIDDM  11. Hx of Ischemic stroke in 2018  12. Hx of PFO s/p closure in 2/14/19  13. Hx of LATISHA  14. Hx of Chronic Back Pain - has had multiple steroid inj in past    Hospital Course: The patient is a 73 y.o. male with a past medical history of home, NIDDM, ischemic stroke in 2018, PFO s/p closure in 2/14/2019, LATISHA, chronic back pain who presents to the ED for concerns of dizziness, confusion, headache. Patient states that this morning around 11 AM he was in his car and when he tried to move his right hand to place the key into the ignition he could not. He states that his right arm started to shake and move laterally before moving towards the admission. Patient was unable to put the key to the admission at this time. Patient then tried to call his son in the house on the phone but could not dial his son's phone number. Patient then subsequently honked his horn in the car to get his thoughts attention in which his son came out. Patient states that during this episode his \"hands would not do what I wanted. \"  Patient's son subsequently drove him to the ED. Patient states that he feels this episode lasted about 25-30 minutes and by the time he arrived in the ED parking lot his symptoms have fully resolved. Patient stated that he deliberated presenting to the ED or not but due to the fact that this felt similar to his previous stroke in 2018 patient decided to enter the ED.     In the ED CT Head w/o contrast showed no acute intracranial hemorrhage or edema. Labs and vitals overall stable. Patient admitted for further workup. Neurology was consulted. MRI brain w/o contrast was ordered and showed no acute intracranial abnormality. Specifically no evidence of acute infarct. Echocardiogram was ordered and performed with results pending at this time. Echocardiogram ordered for assessment of structural integrity of PFO closure. Per neuro, patient to have EEG performed as outpatient. Patient also has history of LATISHA and there are concerns for cataplexy and patient to follow up for concerns of that as well. Discharge planning discussed. All questions addressed at this time. Patient admits to understanding of plan. Significant findings (history and exam, laboratory, radiological, pathology, other tests):   · General Appearance: alert, appears stated age and cooperative  · HEENT:  Head: Normal, normocephalic, atraumatic. · Neck: no adenopathy, no carotid bruit, no JVD, supple, symmetrical, trachea midline and thyroid not enlarged, symmetric, no tenderness/mass/nodules  · Lung: clear to auscultation bilaterally  · Heart: regular rate and rhythm, S1, S2 normal, no murmur, click, rub or gallop  · Abdomen: soft, non-tender; bowel sounds normal; no masses,  no organomegaly  · Extremities:  extremities normal, atraumatic, no cyanosis or edema  · Musculokeletal: No joint swelling, no muscle tenderness. ROM normal in all joints of extremities. · Neurologic: Mental status: Alert, oriented, thought content appropriate    Pending test results:   1. Echocardiogram    Consults:  1. Neurology    Procedures:  1. N/A    Condition at discharge: Stable    Disposition: home    Outpatient Recommendations:  1. Follow Echo results  2. Reassess sleep study and concerns for cataplexy  3. Follow EEG results  4.  Reassess and see if patient continues to have these concerns as outpatient    Discharge Medications:     Medication List      START taking these medications    aspirin 81 MG EC tablet  Take 1 tablet by mouth daily  Start taking on: April 12, 2022        Rukhsana Brice taking these medications    apixaban 5 MG Tabs tablet  Commonly known as: Eliquis  Take 1 tablet by mouth 2 times daily     atorvastatin 40 MG tablet  Commonly known as: LIPITOR  Take 0.5 tablets by mouth nightly     blood glucose monitor kit and supplies  Test 2 times a day before breakfast and supper     blood glucose test strips  2 times daily. dicyclomine 10 MG capsule  Commonly known as: BENTYL  Take 1 capsule by mouth every 6 hours as needed (Bowel spasms)     dilTIAZem 120 MG extended release capsule  Commonly known as: CARDIZEM CD  Take 1 capsule by mouth daily     meclizine 25 MG tablet  Commonly known as: ANTIVERT  Take 1 tablet by mouth 2 times daily as needed for Dizziness     metFORMIN 1000 MG tablet  Commonly known as: GLUCOPHAGE  Take 1 tablet by mouth 2 times daily (with meals)     Narcan 4 MG/0.1ML Liqd nasal spray  Generic drug: naloxone     nicotine polacrilex 2 MG gum  Commonly known as: Nicorette  Take 1 each by mouth as needed for Smoking cessation Maximum dose: 24 pieces/24 hours. Stop chewing and park the piece of Nicorette between your cheek/ gums. After about a minute, when the tingling is almost gone, start chewing again. Repeat this process until the tingle is gone (about 30 minutes). * oxyCODONE 5 MG immediate release tablet  Commonly known as: ROXICODONE     * OxyCONTIN 60 MG T12a extended release tablet  Generic drug: oxyCODONE     * OxyCONTIN 80 MG extended release tablet  Generic drug: oxyCODONE     tamsulosin 0.4 MG capsule  Commonly known as: FLOMAX  Take 1 capsule by mouth daily     vitamin B-1 100 MG tablet  Commonly known as: THIAMINE  Take 1 tablet by mouth daily         * This list has 3 medication(s) that are the same as other medications prescribed for you.  Read the directions carefully, and ask your doctor or other care provider to review them with you.            STOP taking these medications    lidocaine 5 % ointment  Commonly known as: XYLOCAINE           Where to Get Your Medications      These medications were sent to [facility] [address] [address]    Phone: [phone]   · aspirin 81 MG EC tablet          [facility] Internal Medicine Resident Service    Activity as tolerated  Diet: low fat  Be compliant with your medications and take them as prescribed. Special Instructions:   Please START taking Aspirin 81mg daily  Please go for EEG and sleep study as outpatient  Please follow up with PCP on 4/19/22 @2:30PM  Please follow up with Neurology within 1 week after discharge    Hospital Follow up: [id number] [address] with House Team   Call to confirm appointment Tel: [phone] ([address]  Internal Medicine Clinic)     Other Follow-Ups:    Future Appointments   Date Time Provider Daniela Banerjee   4/15/2022 10:00 AM Keyla Davila, PT [facility]   5/10/2022  9:00 AM [facility] DIAB ED CLASSROOM [address]   5/11/2022  9:00 AM Hernan Beard [place] ED CLASSROOM 01 [facility]       Other than any new prescriptions given to you today, the list of home going meds on this After Visit Summary are based on information provided to us from you. This information, including the list, dose, and frequency of medications is only as accurate as the information you provided. If you have any questions or concerns about your home medications, please contact your Primary Care Physician for further clarification.       Antonio Contreras MD  PGY-1   4:02 PM 4/11/2022

## 2022-04-11 NOTE — PROGRESS NOTES
Yamini Olivares is a 73 y.o. right handed male     Patient was admitted for possible stroke. States that the day of admission he was sitting in his car when he had an episode where he \"just could not move \". He was awake he knew his surroundings he denies any confusion he saw his grandson come out helped him get to the backseat where he drove him to the hospital.  By the time he arrived in the hospital he did feel somewhat better but wanted it checked out because this is an first time it happened. He has had previous strokes and was previously identified to have a PFO which was closed a few years ago. He admits that he sleeps well but states that he could fall asleep doing nothing but watching television. He was previously diagnosed with sleep apnea however refuses CPAP machine  He is open to repeat evaluation-he is unaware if he was ever checked for cataplexy    Currently denies any focal weakness or confusion  Denies any difficulty chewing or swallowing    No seizure-like activity no tongue biting urinary bowel incontinence.      No chest pain or palpitations  No SOB  No vertigo, lightheadedness or loss of consciousness  No falls, tripping or stumbling  No incontinence of bowels or bladder  No itching or bruising appreciated    ROS otherwise negative     Allergies as of 04/09/2022 - Fully Reviewed 04/09/2022   Allergen Reaction Noted    Lisinopril Other (See Comments) 03/29/2022    Toradol [ketorolac tromethamine] Other (See Comments) 02/15/2015     Objective:     /72   Pulse 59   Temp 97.8 °F (36.6 °C) (Temporal)   Resp 18   Wt (!) 355 lb (161 kg)   SpO2 94%   BMI 45.58 kg/m²      General appearance: alert, appears stated age and cooperative  Head: Normocephalic, without obvious abnormality, atraumatic  Extremities: no cyanosis or edema  Pulses: 2+ and symmetric  Skin: no rashes or lesions    Mental Status: Alert, oriented, thought content appropriate    Speech: clear  Language: appropriate    Cranial Nerves:  I: smell    II: visual acuity     II: visual fields Full   II: pupils ASHLEY   III,VII: ptosis None   III,IV,VI: extraocular muscles  EOMI without nystagmus    V: mastication Normal   V: facial light touch sensation  Normal   V,VII: corneal reflex  Present   VII: facial muscle function - upper     VII: facial muscle function - lower Normal   VIII: hearing Normal   IX: soft palate elevation  Normal   IX,X: gag reflex    XI: trapezius strength  5/5   XI: sternocleidomastoid strength 5/5   XI: neck extension strength  5/5   XII: tongue strength  Normal     Motor:  5/5 throughout  Normal bulk and tone    Sensory:  Normal to LT     Coordination:   FN, FFM and MARK symmetrical    DTR:   No reflexes    No Riddle's     Laboratory/Radiology:     CBC with Differential:    Lab Results   Component Value Date    WBC 6.2 04/11/2022    RBC 4.05 04/11/2022    HGB 11.8 04/11/2022    HCT 37.2 04/11/2022     04/11/2022    MCV 91.9 04/11/2022    MCH 29.1 04/11/2022    MCHC 31.7 04/11/2022    RDW 12.4 04/11/2022    SEGSPCT 60 08/29/2011    LYMPHOPCT 39.8 04/11/2022    MONOPCT 10.8 04/11/2022    BASOPCT 0.6 04/11/2022    MONOSABS 0.67 04/11/2022    LYMPHSABS 2.46 04/11/2022    EOSABS 0.13 04/11/2022    BASOSABS 0.04 04/11/2022     CMP:    Lab Results   Component Value Date     04/11/2022    K 4.2 04/11/2022     04/11/2022    CO2 24 04/11/2022    BUN 16 04/11/2022    CREATININE 1.0 04/11/2022    GFRAA >60 04/11/2022    LABGLOM >60 04/11/2022    GLUCOSE 161 04/11/2022    GLUCOSE 138 08/29/2011    PROT 7.5 04/09/2022    LABALBU 3.8 04/09/2022    LABALBU 4.3 03/04/2011    CALCIUM 8.8 04/11/2022    BILITOT 0.3 04/09/2022    ALKPHOS 89 04/09/2022    AST 14 04/09/2022    ALT 13 04/09/2022     Carotid US  Atherosclerotic disease. No hemodynamically significant stenosis is  identified  Estimated stenosis by NASCET criteria in the proximal right carotid  artery is between 0% and 49%.   Estimated

## 2022-04-11 NOTE — CARE COORDINATION
Attempted to meet with patient, currently having echo completed. Will follow-up as time allows. Tammie Canchola RN.

## 2022-04-12 ENCOUNTER — CARE COORDINATION (OUTPATIENT)
Dept: CASE MANAGEMENT | Age: 72
End: 2022-04-12

## 2022-04-12 DIAGNOSIS — G45.9 TIA (TRANSIENT ISCHEMIC ATTACK): Primary | ICD-10-CM

## 2022-04-12 PROCEDURE — 1111F DSCHRG MED/CURRENT MED MERGE: CPT | Performed by: INTERNAL MEDICINE

## 2022-04-12 NOTE — CARE COORDINATION
Karen [facility] Transitions Initial Follow Up Call    Call within 2 business days of discharge: Yes    Patient: Dewitte Goodell Patient : 1950   MRN: [id number]  Reason for Admission: TIA  Discharge Date: 22 RARS: Readmission Risk Score: 10.7 ( )      Last Discharge [facility]       Complaint Diagnosis Description Type Department Provider    22 Dizziness TIA (transient ischemic attack) . .. ED to Hosp-Admission (Discharged) (ADMITTED) [facility]S EN[facility] [address] [facility] Tyler Wallis MD; Linda Hauser . .. Spoke with pt for initial care transition call post hospital discharge. Pt admitted on 22 with dx TIA. Pt presented to the hospital with c/o dizziness, confusion, HA, and R hand/arm being shaky/uncoordinated movements. Pt states that he is feeling \"pretty good\" today and denies any dizziness/lightheadedness, confusion, HAs, or extremity shakiness/uncoordination. Pt denies any return symptoms to which brought him to the hospital initially. Reviewed AVS in full with pt including full medication review and f/u appts. 1111F entered, as MH pcp. Pt does have 81 mg asa and is taking as prescribed. Pt has all home medications filled and on hand. Pt has a scheduled f/u with his pcp on 22 @ 2:30pm. Reviewed with pt and he is aware. Pt to f/u with [facility] in 1 wk. Pt prefers to call to schedule on his own. CTN provided the pt with the clinic's contact number. Pt aware that he will need to schedule an OP EEG and sleep study. CTN provided pt with contact number for the sleep lab. Pt states he will inquire with the neurology office about setting up his OP EEG. Pt voiced no other needs/concerns at this time. Pt was agreeable to continued outreaches from this CTN.      Facility[facility]    Non-face-to-face services provided:  Scheduled appointment with PCP-see above  Scheduled appointment with Specialist-see above  Obtained and reviewed discharge summary and/or continuity of care documents  Reviewed and followed up on pending diagnostic tests and treatments-see above  Assessment and support for treatment adherence and medication management-1111F entered. Transitions of Care Initial Call    Challenges to be reviewed by the provider   Additional needs identified to be addressed with provider: No  none             Method of communication with provider : none      Advance Care Planning:   Does patient have an Advance Directive: Health care decision maker information reviewed. Was this a readmission? No  Patient stated reason for admission: dizzy, confused, hand shaky  Patients top risk factors for readmission: lack of knowledge about disease  medical condition-hx CVA, htn, DM, afib, obesity  polypharmacy    Care Transition Nurse (CTN) contacted the patient by telephone to perform post hospital discharge assessment. Provided introduction to self, and explanation of the CTN role. CTN reviewed discharge instructions, medical action plan and red flags with patient who verbalized understanding. Patient given an opportunity to ask questions and does not have any further questions or concerns at this time. Were discharge instructions available to patient? Yes. Reviewed appropriate site of care based on symptoms and resources available to patient including: PCP  Specialist  When to call 911. The patient agrees to contact the PCP office for questions related to their healthcare. Medication reconciliation was performed with patient, who verbalizes understanding of administration of home medications. CTN provided contact information. Plan for follow-up call in 7-10 days based on severity of symptoms and risk factors. Plan for next call: symptom management-any return symptoms?  follow up appointment-Has pt scheduled EEG and sleep study? Has pt attended OV with pcp? Has pt scheduled f/u with neurology?       Care Transitions 24 Hour Call    Schedule Follow Up Appointment with PCP: Completed  Do you have a copy of your discharge instructions?: Yes  Do you have all of your prescriptions and are they filled?: Yes  Have you been contacted by a [address]?: No  Have you scheduled your follow up appointment?: Yes  How are you going to get to your appointment?: Car - family or friend to transport  Were you discharged with any Home Care or Post Acute Services: No  Patient DME: Straight cane  Do you have support at home?: Partner/Spouse/SO  Do you feel like you have everything you need to keep you well at home?: Yes  Are you an active caregiver in your home?: No  Care Transitions Interventions  No Identified Needs         Follow Up  Future Appointments   Date Time Provider Daniela Banerjee   4/15/2022 10:00 AM SHELDON Ruby PT [facility]   4/19/2022  2:30 PM Caroline Jean DO [facility]   5/10/2022  9:00 AM [facility] DIAB ED CLASSROOM [address]   5/11/2022  9:00 AM [facility] DIAB ED CLASSROOM 01 [facility] [facility] Stokes RN

## 2022-04-14 NOTE — PROGRESS NOTES
Physician Progress Note      PATIENT:               Neal Cifuentes  CSN #:                  [id number]  :                       1950  ADMIT DATE:       2022 4:30 PM  [address] DATE:        2022 6:44 PM  RESPONDING  PROVIDER #:        Taylor Hair DO          QUERY TEXT:    Pt admitted with TIA. Per discharge Summary appears less likely TIA . If   possible, please document in progress notes and discharge summary if you are   evaluating and /or treating any of the following: The medical record reflects the following:  Risk Factors: LATISHA; A-fib; ischemic stoke; NIDDM  Clinical Indicators:  : Per H&P: This morning he was in his care he tried to move his right   hand to place key into the ignition and could not. His right arm started to   shake. Assessment: Concern for TIA,  history of A-fib on Eliquis at home; History of Ischemic stroke. plan Neurology consult; MRI , Carotid Ultrasound;  22: Per neurology consult: He does have some diminished sensation over   the right face, otherwise his exam if fairly unrevealing. Given the timeline   stroke, TIA, and seizure are all considered. 22: Neurology progress note: History of sleep apnea with CPAP refusal I   question underlying cataplexy but cannot rule out just severe LATISHA; Follow in   office for plan for outpatient EEG testing; Would also recommend evaluation   specifically looking for narcolepsy with cataplexy  22: D/C Summary:  Principle Problem: TIA; Active problem: LATISHA; Sleep   deprivation;  Clinical suspicion for focal seizure VS cataplexy. Appears less   likely TIA given symptoms and on Eliquis (however he weighs 161kg)  22:  CT Head: No acute intracranial hemorrhage or edema Redemonstration of   multiple chronic areas of stroke bilateral cerebellar hemispheres as well as   chronic lacunar stroke involving head of the caudate nucleus on the right  22: Carotid Ultrasound: Atherosclerotic disease.   No hemodynamically   significant stenosis is identified  4/11/22: TTE: technically difficult examination. mild concentric LVH; stage 1   diastolic dysfunction;  EF 60-65%; Prior closure device not well seen but   doppler suggest no shunt. Mild tricuspid regurgitation  4/11/22: MRI Brain: No acute intracranial abnormality. Involution parenchymal   changes with mild microvascular disease as well as bilateral cerebellar and   right caudate chronic lacunar infarcts. Treatment: Neurology consult; MRI; CT Head; carotid Ultrasound; TTE; ASA;    Eliquis ; Lipitor; Neurovascular checks; Thank Surya IVORY, R.N.  Clinical Documentation Improvement  [phone]  Options provided:  -- Embolic TIA due to cerebral embolism  -- TIA symptoms due to Cerebral Artery Occlusion/Stenosis (without Infarction)  -- TIA symptoms due to Seizure  -- TIA symptoms due to Anxiety  -- TIA symptoms due to cataplexy  -- TIA symptoms due to sleep deprivation/LATISHA  -- Other - I will add my own diagnosis  -- Disagree - Not applicable / Not valid  -- Disagree - Clinically unable to determine / Unknown  -- Refer to Clinical Documentation Reviewer    PROVIDER RESPONSE TEXT:    TIA symptoms are due to seizure.     Query created by: Joe Damon on 4/14/2022 1:26 PM      Electronically signed by:  Tomer Ocampo DO 4/14/2022 3:33 PM

## 2022-04-15 ENCOUNTER — HOSPITAL ENCOUNTER (OUTPATIENT)
Dept: PHYSICAL THERAPY | Age: 72
Setting detail: THERAPIES SERIES
Discharge: HOME | End: 2022-04-15
Payer: MEDICARE

## 2022-04-15 PROCEDURE — 97161 PT EVAL LOW COMPLEX 20 MIN: CPT | Performed by: PHYSICAL THERAPIST

## 2022-04-15 ASSESSMENT — PAIN DESCRIPTION - ORIENTATION: ORIENTATION: LOWER

## 2022-04-15 ASSESSMENT — PAIN SCALES - GENERAL: PAINLEVEL_OUTOF10: 8

## 2022-04-15 ASSESSMENT — PAIN DESCRIPTION - LOCATION: LOCATION: BACK

## 2022-04-15 ASSESSMENT — PAIN DESCRIPTION - PAIN TYPE: TYPE: CHRONIC PAIN

## 2022-04-15 ASSESSMENT — PAIN DESCRIPTION - FREQUENCY: FREQUENCY: CONTINUOUS

## 2022-04-15 NOTE — PROGRESS NOTES
Physical Therapy  Initial Assessment  Date: 4/15/2022  Patient Name: Doroteo Price  MRN: [id number]  : 1950          Restrictions       Subjective   General  Chart Reviewed: Yes  Referring Practitioner: Morene Shone  Diagnosis: E11.69, E66.9 (ICD-10-CM) - Diabetes mellitus type 2 in obese (HCC)M54.40, G89.29 (ICD-10-CM) - Chronic midline low back pain with sciatica, sciatica laterality unspecified  Follows Commands: Within Functional Limits  PT Visit Information  PT Insurance Information: Medicare   cert dates: 91 to 7/15/22  Total # of Visits to Date: 1  Subjective  Subjective: Patient presents to PT with c/o low back pain. States having back injury across lower lumbar region- injury was work-related however currently not under [address] [place] [address]. Patient has not had surgery. Multiple injections per pt (approx 42 per pt). Patient admits to walking with lofstrand crutch. He does have multiple ADs at home (quad cane/FWW/rollator). Pt has had multiple falls. Admits to LE weakness. . Limited distances with ambulation due to general weakness and pain. Pt recently had radiofrequency for symptom reduction. Pt takes oxycodone for pain relief. Pt does have diabetic neuropathy. Pt would like to have aquatic therapy to help reduce symptoms. Pain Screening  Patient Currently in Pain: Yes  Pain Assessment  Pain Assessment: 0-10  Pain Level: 8  Pain Type: Chronic pain  Pain Location: Back  Pain Orientation: Lower  Pain Frequency: Continuous  Vital Signs  Patient Currently in Pain: Yes    Objective     Observation/Palpation  Posture: Poor (fwd trunk/fwd head posturing both sit  and stand)  Palpation: pain with palpation across B LS region (L > R);  Pain into L thoracic region also  Edema: mild edema noted across BLEs    PROM RLE (degrees)  RLE General PROM: R knee -20*  to 75*  PROM LLE (degrees)  LLE General PROM: L knee -20*  to 90*  Spine  Lumbar: limited 50% all planes    Strength RLE  Comment: RLE- grossly 4-/5 all planes  Strength LLE  Comment: LLE- grossly 3+ to 4-/5 all planes  Strength Other  Other: Trunk- grossly 3+/5         Sensation  Overall Sensation Status: Impaired  Light Touch: Partial deficits in the LLE             Ambulation  Ambulation?: Yes  Ambulation 1  Surface: level tile  Device: Forearm Crutches  Assistance: Modified Independent  Quality of Gait: Decreased step length noted with flat foot contact bilaterally; knee flexion present throughout      Assessment   Conditions Requiring Skilled Therapeutic Intervention  Body structures, Functions, Activity limitations: Decreased functional mobility ; Decreased endurance;Decreased ROM; Decreased strength;Decreased posture; Increased pain  Assessment: pt presents to PT with c/o back pain; limited AROM/strength of trunk with hindered posturing; limited strength across BLEs (L > R) with hindered gait  Prognosis: Fair  Decision Making: Low Complexity  REQUIRES PT FOLLOW UP: Yes         Plan   Plan  Times per week: 1x/week x 10weeks for aquatic  Current Treatment Recommendations: Aquatics      Goals  Short term goals  Time Frame for Short term goals: 3 weeks   Short term goal 1: Increase strength of BLEs/trunk to: grossly  4/5 all planes to improve functional gait  Short term goal 2: Increase AROM of B knees to < -15* to improve functional gait   Short term goal 3: Increase trunk AROM to within 75% functional limits  Short term goal 4: Increase ambulation with pt demonstrating increased knee ext allowing for increased heel-toe gait with AAD  Short term goal 5: Decrease pain to < 6/10 across back/BLEs with activity   Long term goals  Time Frame for Long term goals : 6 weeks   Long term goal 1: Increase strength of BLEs/trunk to: at least 4 to 4+/5 all planes to improve functional gait  Long term goal 2: Increase AROM of B knees to < -10* to improve functional gait   Long term goal 3: Increase trunk AROM to within 80-90% functional limits  Long term goal 4:  Increase ambulation with pt demonstrating slight knee flexion throughout however with increased heel-toe gait with AAD   Long term goal 5: Decrease pain to < 4/10 across back/BLEs with activity   Long term goal 6: Patient demonstrates independence with HEP        Therapy Time   Individual Concurrent Group Co-treatment   Time In 1005         Time Out 1055         Minutes [address]   T: [phone]   F: [phone]     If you have any questions or concerns, please don't hesitate to call. Thank you for your referral.    Physician Signature:________________________________Date:__________________  By signing above, therapists plan is approved by physician. All patients under [employer]   must be signed by physician.

## 2022-04-18 PROBLEM — F80.81 STUTTERING: Status: RESOLVED | Noted: 2019-09-10 | Resolved: 2022-04-18

## 2022-04-20 ENCOUNTER — CARE COORDINATION (OUTPATIENT)
Dept: CASE MANAGEMENT | Age: 72
End: 2022-04-20

## 2022-04-20 ENCOUNTER — HOSPITAL ENCOUNTER (OUTPATIENT)
Dept: PHYSICAL THERAPY | Age: 72
Setting detail: THERAPIES SERIES
Discharge: HOME | End: 2022-04-20
Payer: MEDICARE

## 2022-04-20 NOTE — CARE COORDINATION
[facility] Transitions Follow Up Call    2022    Patient: Juan Burgos  Patient : 1950   MRN: [id number]  Reason for Admission:   Discharge Date: 22 RARS: Readmission Risk Score: 10.7 ( )         Spoke with pt briefly for a sub care transition call, as pt was in the car. Pt states that he has been doing \"very, very well\" and voices no complaints. Pt denies any return symptoms of dizziness, disorientation/confusion, HAs, or R hand/arm shakes. Pt states that he feels \"fine\" now. CTN processed with pt that he missed his HFU appt with his pcp yesterday. Pt was unaware and states that he must have forgotten. Pt states that he plans to reschedule, but declines assistance from this CTN to do so at this time. Pt states that he would like to call this CTN back later as he could not stay on the phone. Pt states that he thinks he still needs to schedule appts for the OP EEG and with [facility]. CTN did not note that these appts were [facility] in [facility]. Pt stated that he did schedule his OP sleep study already. Pt states that he will call this CTN back when he gets home and discuss needed appts and whether or not he would like assistance with scheduling. CTN had provided pt with these contact numbers last week, as pt wanted to schedule them on his own. CTN will await pt's return call. Pt did not voice any other needs/concerns at this time. Care Transitions Follow Up Call    Needs to be reviewed by the provider   Additional needs identified to be addressed with provider: No  none             Method of communication with provider : none      Care Transition Nurse (CTN) contacted the patient by telephone to follow up after admission on 22. Addressed changes since last contact: none    CTN reviewed medical action plan and red flags with patient and discussed any barriers to care and/or understanding of plan of care after discharge.  Discussed appropriate site of care based on symptoms and resources available to patient including: PCP  Specialist  When to call 911. The patient agrees to contact the PCP office for questions related to their healthcare. Patients top risk factors for readmission: lack of knowledge about disease  level of motivation  medical condition-CVA, htn, DM, afib, TIA  multiple health system providers  polypharmacy  Interventions to address risk factors: Scheduled appointment with PCP-Pt needs to reschedule missed HFU appt~pt would like to call this CTN back later today to discuss, Scheduled appointment with Specialist[facility] in 1 wk per AVS~pt has not scheduled and Reviewed and followed up on pending diagnostic tests and treatments-OP EEG needs scheduled~pt would like to call CTN back later to discuss. CTN provided contact information for future needs. Plan for follow-up call in 7-10 days based on severity of symptoms and risk factors. Plan for next call: symptom management-any return symptoms?  follow up appointment-Has pt rescheduled OV with pcp, neurology? OP EEG scheduled? Care Transitions Subsequent and Final Call    Subsequent and Final Calls  Do you have any ongoing symptoms?: No  Have your medications changed?: No  Do you have any questions related to your medications?: No  Do you currently have any active services?: No  Do you have any needs or concerns that I can assist you with?: No  Identified Barriers: Impairment, Lack of Education, Lack of Motivation  Care Transitions Interventions  Other Interventions:            Follow Up  Future Appointments   Date Time Provider Daniela Banerjee   4/27/2022  9:45 AM Cici LUGO   5/10/2022  9:00 AM [facility] ED CLASSROOM 01 [address] SHEN [facility]   5/11/2022  9:00 AM [facility] ED CLASSROOM 01 CARL[address] ENRIQUE SHEN [facility]   7/22/2022 10:00 PM [facility] LAB BEDROOM 2 BRANDO SLEEP Amina Marie, RN

## 2022-04-26 ENCOUNTER — CARE COORDINATION (OUTPATIENT)
Dept: CASE MANAGEMENT | Age: 72
End: 2022-04-26

## 2022-04-26 ENCOUNTER — TELEPHONE (OUTPATIENT)
Dept: NEUROLOGY | Age: 72
End: 2022-04-26

## 2022-04-26 NOTE — CARE COORDINATION
[facility] Transitions Follow Up Call    2022    Patient: Marisa Shaikh  Patient : 1950   MRN: [id number]  Reason for Admission: TIA  Discharge Date: 22 RARS: Readmission Risk Score: 10.7 ( )         Spoke with pt for a sub care transition call. Pt admitted on 22 with dx TIA. Pt states that he is doing \"good\" today and voices no complaints. Pt denies any dizziness, lightheadedness, confusion, HAs, or tremors/shakes. CTN processed with pt recommended f/u appts. Pt missed his HFU appt with his pcp on 22 and has not scheduled a f/u with [facility] or with the OP EEG department. Pt now asking CTN for assistance with scheduling these appts. [facility], [facility]N spoke with [facility]. [facility] states that they are scheduling now out into . She states that she will send a message to the nursing staff at [facility] to give the pt a call and see if they can find a sooner appt for the pt given he was a recent discharge. CTN updated the pt on this. CTN advised pt to be alert for any calls that may be coming in, as someone from [facility] will be calling him. He voiced understanding. CTN will request that the  assist pt in coordinating appts with his pcp and the OP EEG dept. CTN let pt know this as well. He voiced understanding. Pt states that he will have transportation to these appts. Pt already has scheduled his OP sleep study for 22. Pt did not voice any other needs/concerns. Pt was agreeable to continued outreaches from this CTN. Care Transitions Follow Up Call    Needs to be reviewed by the provider   Additional needs identified to be addressed with provider: No  none             Method of communication with provider : none      Care Transition Nurse (CTN) contacted the patient by telephone to follow up after admission on 22.      Addressed changes since last contact: none     CTN reviewed medical action plan and red flags with patient and discussed any barriers to care and/or understanding of plan of care after discharge. Discussed appropriate site of care based on symptoms and resources available to patient including: PCP  Specialist  When to call 911. The patient agrees to contact the PCP office for questions related to their healthcare. Patients top risk factors for readmission: lack of knowledge about disease  level of motivation  medical condition-hx CVA, TIA, htn, DM, afib  multiple health system providers  support system  transportation  Interventions to address risk factors: Scheduled appointment with PCP-see above , Scheduled appointment with Specialist-see above and Reviewed and followed up on pending diagnostic tests and treatments-OP EEG    CTN provided contact information for future needs. Plan for follow-up call in 7-10 days based on severity of symptoms and risk factors. Plan for next call: symptom management-any return symptoms?  follow up appointment-Has pt schedule appts with Neuro, pcp, and EEG? Care Transitions Subsequent and Final Call    Subsequent and Final Calls  Do you have any ongoing symptoms?: No  Have your medications changed?: No  Do you have any questions related to your medications?: No  Do you currently have any active services?: No  Do you have any needs or concerns that I can assist you with?: Yes  Patient-reported Needs or Concerns: Appt scheduling and EEG scheduling  Care Transitions Interventions  Other Interventions:            Follow Up  Future Appointments   Date Time Provider Daniela Banerjee   4/27/2022  9:45 AM Cici LUGO   5/10/2022  9:00 AM [facility] ED CLASSROOM 01 [address] [place]   5/11/2022  9:00 AM [facility] ED CLASSROOM 01 [address] [place]   7/22/2022 10:00 PM [facility] BEDROOM 2 BRANDO SLEEP [place] [facility] Sharen Kawasaki, KWAME

## 2022-04-26 NOTE — TELEPHONE ENCOUNTER
[facility] 4/9/22 for dx: Ischemic stroke of frontal lobe (HCC) +5 more. Instructed to follow up 1 week after discharge. Please advise patient [phone].

## 2022-04-26 NOTE — TELEPHONE ENCOUNTER
Spoke with pt and asked if he wanted to make a hospital follow up appointment. Offered 4/27 pt is unable to make, offered next appt on May 25.  Pt refused and wants to cancel the referral.

## 2022-04-27 ENCOUNTER — HOSPITAL ENCOUNTER (OUTPATIENT)
Dept: PHYSICAL THERAPY | Age: 72
Setting detail: THERAPIES SERIES
End: 2022-04-27
Payer: MEDICARE

## 2022-04-28 ENCOUNTER — CARE COORDINATION (OUTPATIENT)
Dept: CASE MANAGEMENT | Age: 72
End: 2022-04-28

## 2022-04-28 ENCOUNTER — HOSPITAL ENCOUNTER (OUTPATIENT)
Dept: SLEEP CENTER | Age: 72
Discharge: HOME | End: 2022-04-28
Payer: MEDICARE

## 2022-04-28 DIAGNOSIS — G47.33 OBSTRUCTIVE SLEEP APNEA: Chronic | ICD-10-CM

## 2022-04-28 PROCEDURE — 95810 POLYSOM 6/> YRS 4/> PARAM: CPT

## 2022-04-28 NOTE — CARE COORDINATION
Request from Bel Benjamin RN.,  Please schedule patient for hospital f/u. Attempt X 2 to contact patient, left VM with this writers name and number. No call back.     Notified Nilesh Reed, [facility]  Care Coordination Transition

## 2022-04-29 VITALS — OXYGEN SATURATION: 94 % | HEART RATE: 70 BPM | WEIGHT: 315 LBS | HEIGHT: 74 IN | BODY MASS INDEX: 40.43 KG/M2

## 2022-04-29 ASSESSMENT — SLEEP AND FATIGUE QUESTIONNAIRES
HOW LIKELY ARE YOU TO NOD OFF OR FALL ASLEEP WHILE WATCHING TV: 2
HOW LIKELY ARE YOU TO NOD OFF OR FALL ASLEEP WHILE SITTING AND TALKING TO SOMEONE: 0
HOW LIKELY ARE YOU TO NOD OFF OR FALL ASLEEP WHILE SITTING QUIETLY AFTER LUNCH WITHOUT ALCOHOL: 0
HOW LIKELY ARE YOU TO NOD OFF OR FALL ASLEEP IN A CAR, WHILE STOPPED FOR A FEW MINUTES IN TRAFFIC: 0
ESS TOTAL SCORE: 2
HOW LIKELY ARE YOU TO NOD OFF OR FALL ASLEEP WHILE SITTING AND READING: 0
HOW LIKELY ARE YOU TO NOD OFF OR FALL ASLEEP WHEN YOU ARE A PASSENGER IN A CAR FOR AN HOUR WITHOUT A BREAK: 0
HOW LIKELY ARE YOU TO NOD OFF OR FALL ASLEEP WHILE LYING DOWN TO REST IN THE AFTERNOON WHEN CIRCUMSTANCES PERMIT: 0
HOW LIKELY ARE YOU TO NOD OFF OR FALL ASLEEP WHILE SITTING INACTIVE IN A PUBLIC PLACE: 0

## 2022-05-01 NOTE — PROGRESS NOTES
[address] [address]                               SLEEP STUDY REPORT    PATIENT NAME: Chace Jauregui                        :        1950  MED REC NO:   [id number]                            ROOM:  ACCOUNT NO:   [de-identified]                           ADMIT DATE: 2022  PROVIDER:     Ryann Garcia MD    DATE OF STUDY:  2022    STUDY PERFORMED:  Polysomnography. Patient of Dr. Reji Christianson. INDICATIONS FOR POLYSOMNOGRAPHY:  Witnessed apnea, excessive daytime  sleepiness, wakes gasping, restless sleep, trouble with  memory/concentration. CURRENT MEDICATIONS:  Eliquis, aspirin, Lipitor, Bentyl, diltiazem,  Antivert, metformin, Narcan, Nicorette, oxycodone, Flomax. INTERPRETATION:  SLEEP ARCHITECTURE:  This patient had a total time in bed of 415  minutes. Total sleep time was 356 minutes. Sleep efficiency was 86%  and sleep latency was 21 minutes. REM latency was 56 minutes. SLEEP STAGING:  The patient was awake 14% of the time in bed. Stage N1  was 8% and N2 was 72% of the total sleep time. Slow wave sleep was  absent and stage REM sleep was 20% of the sleep time. RESPIRATION SUMMARY:  APNEA:  There were no apneic events. HYPOPNEA:  There were 39 hypopneic events, 22 occurred during REM stage  sleep, and maximum duration was 85 seconds. APNEA/HYPOPNEA INDEX:  The apnea/hypopnea index is 7. AROUSAL ANALYSIS:  There were 72 arousals/awakenings, the sleep  disruption index was 12 (normal less than 10). LIMB MOVEMENT SUMMARY:  There were 8 limb movements, the limb movement  index was normal.    OXYGEN SATURATION:  Average oxygen saturation while awake was 92%. Lowest saturation was 84%. The patient spent 31% of the time with  saturation at or greater than 90%. Sixty-nine percent of the time,  saturation was less than 90%.     HEART RATE SUMMARY:  Average heart rate while awake was 65 beats per  minute. Maximum heart rate during sleep was 79 beats per minute and  minimum heart rate during sleep was 54 beats per minute. There were no  ectopic beats. MISCELLANEOUS:  M[place]croft Sleepiness Scale score is 2/24. Snoring was  moderate. This was graded as 4 on a 1 to 10 scale. There was no  apparent bruxism. IMPRESSION:  1. Mild obstructive sleep apnea. 2.  Moderate snoring. 3.  Nocturnal hypoxia. 4.  No cardiac dysrhythmia. DISCUSSION:  This patient has an apnea/hypopnea index of 7. Normal is  less than 5 and mild is 5 to 15, leaving this patient in the mild  category. Although we do not always treat mild disease, when the  patient has nocturnal hypoxia such as in this case, treatment is  indicated. SUGGESTIONS:  1.  Dr. Hai Cohen to discuss results of study with the patient. 2.  The patient should return to [facility] for positive  airway pressure titration.         oTmas Clayton MD  Diplomat of Sleep Medicine    D: 04/30/2022 18:00:51       T: 04/30/2022 18:03:03     [email or web address]  Job#: [id number]     Doc#: [id number]    CC:

## 2022-05-02 DIAGNOSIS — G47.33 OBSTRUCTIVE SLEEP APNEA (ADULT) (PEDIATRIC): Primary | ICD-10-CM

## 2022-05-05 ENCOUNTER — CARE COORDINATION (OUTPATIENT)
Dept: CASE MANAGEMENT | Age: 72
End: 2022-05-05

## 2022-05-05 NOTE — CARE COORDINATION
[facility] Transitions Follow Up Call    2022    Patient: Deanna Qureshi  Patient : 1950   MRN: [id number]  Reason for Admission: TIA  Discharge Date: 22 RARS: Readmission Risk Score: 10.7 ( )         Spoke with pt for a sub care transition call. Pt states that he is doing \"a whole lot better. \" Pt denies any dizziness/lightheadedness, tremors/shakes, or bouts of confusion/disorientation. Pt states that he has been staying active and walking and exercising. Pt reports to compliance with his medications. CTN attempted to assist pt in scheduling a f/u with the neurology office on last encounter. Neurology office did f/u with the pt and offered a few appointments, however, noted the pt declined appts and canceled the referral. CTN processed this with the pt today and he admits that he was getting frustrated and felt overwhelmed with the phone call. Pt states that he would like to try and schedule something again. CTN encouraged the pt to contact the neurology office and discuss available appts. Pt has contact information to the neurology office. Pt would like to try again to schedule the OP EEG. Pt provided with this contact number again on today's call. Pt states, \"You have been helping me so much and I am the one that isn't doing anything for myself. \" Pt states that he intends on getting his appts scheduled. Pt is aware also that he needs to reschedule his missed pcp appt. Pt was a no show on 22 for his pcp's appt. Pt states that he prefers to take care and schedule his own appts. He has all contact information for the offices. Noted pt was able to complete his OP sleep study. Noted mild sleep apnea on results and that f/u was indicated. Pt states that the sleep study is going to be contacting him regarding scheduling additional testing at the sleep lab (airway pressure titration).  Pt stated that the sleep study was currently calling in at the time this CTN was on the phone with him and he asked to end call now. Pt voiced no other needs/concerns at this time. Pt was agreeable to a final outreach from this CTN next week. Care Transitions Follow Up Call    Needs to be reviewed by the provider   Additional needs identified to be addressed with provider: No  none             Method of communication with provider : none      Care Transition Nurse (CTN) contacted the patient by telephone to follow up after admission on 4/9/22. Addressed changes since last contact: No changes since previous encounter. Encouraged pt again about appts needed. Contact information for offices have been provided on mulitiple encounters. CTN has attempted to assist pt previously with scheduling however he has not followed through. CTN reviewed medical action plan and red flags with patient and discussed any barriers to care and/or understanding of plan of care after discharge. Discussed appropriate site of care based on symptoms and resources available to patient including: PCP  Specialist  When to call 911. The patient agrees to contact the PCP office for questions related to their healthcare. Patients top risk factors for readmission: lack of knowledge about disease  level of motivation  medical condition-Obese, hx CVA, htn, DM, A-fib  multiple health system providers  support system  Interventions to address risk factors: Scheduled appointment with PCP-Rescheduled missed appt. and Scheduled appointment with Specialist-Scheduled with [facility] and OP EEG dept    CTN provided contact information for future needs. Plan for follow-up call in 7-10 days based on severity of symptoms and risk factors. Plan for next call: symptom management-any return symptoms?  follow up appointment-Has pt scheduled OP EEG? Has pt scheduled with neuro and pcp? Has pt scheduled titration study with sleep lab?     Care Transitions Subsequent and Final Call    Subsequent and Final Calls  Do you have any ongoing symptoms?: No  Have your medications changed?: No  Do you have any questions related to your medications?: No  Do you currently have any active services?: No  Do you have any needs or concerns that I can assist you with?: No  Care Transitions Interventions  Other Interventions:            Follow Up  Future Appointments   Date Time Provider Daniela Banerjee   5/10/2022  9:00 AM [facility] ED CLASSROOM 01 [address] [place]   5/11/2022  9:00 AM [facility] ED CLASSROOM 01 CARL[address] ENRIQUE Fang RN

## 2022-05-10 ENCOUNTER — HOSPITAL ENCOUNTER (OUTPATIENT)
Dept: DIABETES SERVICES | Age: 72
Setting detail: THERAPIES SERIES
Discharge: HOME | End: 2022-05-10
Payer: MEDICARE

## 2022-05-10 PROCEDURE — G0109 DIAB MANAGE TRN IND/GROUP: HCPCS

## 2022-05-10 SDOH — ECONOMIC STABILITY: FOOD INSECURITY: ADDITIONAL INFORMATION: NO

## 2022-05-10 ASSESSMENT — PROBLEM AREAS IN DIABETES QUESTIONNAIRE (PAID)
WORRYING ABOUT THE FUTURE AND THE POSSIBILITY OF SERIOUS COMPLICATIONS: 3
FEELING DEPRESSED WHEN YOU THINK ABOUT LIVING WITH DIABETES: 1
FEELING THAT DIABETES IS TAKING UP TOO MUCH OF YOUR MENTAL AND PHYSICAL ENERGY EVERY DAY: 2
COPING WITH COMPLICATIONS OF DIABETES: 4
FEELING SCARED WHEN YOU THINK ABOUT LIVING WITH DIABETES: 1
PAID-5 TOTAL SCORE: 11

## 2022-05-10 NOTE — PROGRESS NOTES
Diabetes Self-Management Education Record    Participant Name: Antonia Valencia  Referring Provider: Itz Owen MD  Assessment/Evaluation Ratings:  1=Needs Instruction   4=Demonstrates Understanding/Competency  2=Needs Review   NC=Not Covered    3=Comprehends Key Points  N/A=Not Applicable  Topics/Learning Objectives Pre-session Assess Date:  Instructor initials/date  5/10/22 KMS Instr. Date    Instructor initials/date  5/10/22 KMS Follow-up Post- session Eval Comments   Diabetes disease process & Treatment process:   -Define type of diabetes in simple terms.  - Describe the ABCs of  diabetes management  -Identify own type of diabetes  -Identify lifestyle changes/treatment options  -other:  1 [x] All     []  []  []  []  []  4 Type 2 DM  A1C 7.6%    Developing strategies for Healthy coping/psychosocial issues:    -Describe feelings about living with diabetes  -Identify coping strategies and sources of stress  -Identify support needed & support network available  -Complete PAID-5 Diabetes questionnaire 1 [x] All     []  []  []    []  4   5/10/22 KMS  PAID-5 Score: 11  PCP notified via EMR. Prevention, detection & treatment of Chronic complications:    -Identify the prevention, detection and treatment for complications including immunizations, preventive eye, foot, dental and renal exams as indicated per the participant's duration of diabetes and health status.  -Define the natural course of diabetes and the relationship of blood glucose levels to long term complications of diabetes.   1 [x] All     []            []  4 H/O Stroke, neuropathy    Prevention, detection & treatment of acute complications:    -State the causes,signs & symptoms of hyper & hypoglycemia, and prevention & treatment strategies.   -Describe sick day guidelines  DKA /indications for ketone testing &  when to call physician  1 [x] All     []      []    4          -Identify severe weather/situation crisis  & diabetes supplies management  [] Using medications safely:   -State effects of diabetes medicines on blood glucose levels;  -List diabetes medication taken, action & side effects 1 [x] All     []  []  4 5/10/22 KMS  Metformin 1000 mg BID    Insulin/Injectables/glucagon  -Name appropriate injection sites; proper storage; supplies needed;     []   N/A    Demonstrate proper technique  []      Monitoring blood glucose, interpreting and using results:   -Identify the purpose of testing   -Identify recommended & personal blood glucose targets & HgbA1C target levels  -State the Importance of logging blood glucose levels for pattern recognition;   -State benefits of reading/using pt generated health data  -Verbalize safe lancet disposal 1 [x] All     []  []    []  []  []  4 5/10/22 KMS  Monitors a few times per week    -Demonstrate proper testing technique  []      Incorporating physical activity into lifestyle:   -State effect of exercise on blood glucose levels;   -State benefits of regular exercise;   -Define safety considerations/food choices if needed.  -Describe contraindications/maintenance of activity. 1 [x] All     []  []    []  []  4 5/10/22 KMS  Not physically active    Incorporating nutritional management into lifestyle:   -Describe effect of type, amount & timing of food on blood glucose  -Describe methods for preparing and planning   healthy meals  -Correctly read food labels for nutritional values  -Name 3 foods high in Carbohydrate  -Plan a sample 4 carbohydrate-controlled meal using Diabetes Plate Method  -Verbalized ability to measure and count carbohydrate gram servings using food labels and carbohydrate food list.    -Plan a carbohydrate-controlled meal based on individual needs/preferences from a Registered Dietitian.   1 [] All       []    []    []  []      []        []                  Developing strategies for problem solving to promote health/change behavior.   -Identify 7 self-care behaviors; Personal health risk factors; Benefits, challenges & strategies for behavioral change and set an individualized goal selection.  1       []     []Nutrition  []Monitoring  []Exercise  []Medication  []Other     Identified Barriers to learning/adherence to self management plan:    Physical, Speech and Cognitive  []  other    Instruction Method:  Lecture/Discussion, Power Point Presentation  and Handouts    Supporting Education Materials/Equipment Provided: Self-management manual and Nutritional Packet   []Panamanian materials       [] services     []Other:      Encounter Type Date Attended Start Time End Time Comments No Show Dates   Assessment 5/10/22 KMS         Session 1 5/10/22 KMS 0900 36      Session 2        1:1 DSMES          In person Follow-up         Gestational Diabetes         Individual MNT        Meter Instrx        Insulin Instrx           Additional Comments:        Date:   Follow-up goal attainment based on patients initial DSMES goal    Dr Notified by [] EMR []Fax        []Post class Hgb A1C  []Medication compliance   []Plate method/meal plan compliance   []Able to state the number of Carbohydrate servings eaten at B,L,D   []Testing blood glucose as prescribed by PCP   []Exercise Routine   []Other:   []Other:     []Patient lost to follow-up   Notified by []EMR []Fax

## 2022-05-11 ENCOUNTER — APPOINTMENT (OUTPATIENT)
Dept: DIABETES SERVICES | Age: 72
End: 2022-05-11
Payer: MEDICARE

## 2022-05-12 ENCOUNTER — CARE COORDINATION (OUTPATIENT)
Dept: CASE MANAGEMENT | Age: 72
End: 2022-05-12

## 2022-05-12 NOTE — CARE COORDINATION
[facility] Transitions Follow Up Call    2022    Patient: Ciro Osborne  Patient : 1950   MRN: [id number]  Reason for Admission: TIA  Discharge Date: 22 RARS: Readmission Risk Score: 10.7 ( )         Attempted to reach the patient for subsequent Care Transition call. Message left with CTN's contact information requesting return phone call. No further outreaches will be made at this time. If no return call by the end of today, CTN will sign off and resolve CT episode (episode was due to end today).     Follow Up  Future Appointments   Date Time Provider Daniela Banerjee   2022  5:30  [facility] 01 CARL[facility] ENRIQUE Castro   2022 10:00 PM [facility] Amina BRITTNEY Roque RN

## 2022-06-24 ENCOUNTER — TELEPHONE (OUTPATIENT)
Dept: INTERNAL MEDICINE | Age: 72
End: 2022-06-24

## 2022-06-29 DIAGNOSIS — N40.1 BENIGN PROSTATIC HYPERPLASIA WITH URINARY RETENTION: ICD-10-CM

## 2022-06-29 DIAGNOSIS — I48.20 CHRONIC ATRIAL FIBRILLATION (HCC): ICD-10-CM

## 2022-06-29 DIAGNOSIS — R33.8 BENIGN PROSTATIC HYPERPLASIA WITH URINARY RETENTION: ICD-10-CM

## 2022-06-29 RX ORDER — DILTIAZEM HYDROCHLORIDE 120 MG/1
CAPSULE, COATED, EXTENDED RELEASE ORAL
Qty: 30 CAPSULE | Refills: 0 | OUTPATIENT
Start: 2022-06-29

## 2022-06-29 RX ORDER — TAMSULOSIN HYDROCHLORIDE 0.4 MG/1
0.4 CAPSULE ORAL DAILY
Qty: 30 CAPSULE | Refills: 0 | OUTPATIENT
Start: 2022-06-29

## 2022-06-30 DIAGNOSIS — N40.1 BENIGN PROSTATIC HYPERPLASIA WITH URINARY RETENTION: ICD-10-CM

## 2022-06-30 DIAGNOSIS — R33.8 BENIGN PROSTATIC HYPERPLASIA WITH URINARY RETENTION: ICD-10-CM
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
## 2022-06-30 DIAGNOSIS — I48.20 CHRONIC ATRIAL FIBRILLATION (HCC): ICD-10-CM

## 2022-06-30 DIAGNOSIS — E78.5 HYPERLIPIDEMIA, UNSPECIFIED HYPERLIPIDEMIA TYPE: ICD-10-CM

## 2022-06-30 RX ORDER — TAMSULOSIN HYDROCHLORIDE 0.4 MG/1
0.4 CAPSULE ORAL DAILY
Qty: 90 CAPSULE | Refills: 0 | Status: SHIPPED
Start: 2022-06-30 | End: 2022-08-15 | Stop reason: SDUPTHER

## 2022-06-30 RX ORDER — ATORVASTATIN CALCIUM 40 MG/1
20 TABLET, FILM COATED ORAL NIGHTLY
Qty: 45 TABLET | Refills: 0 | Status: SHIPPED
Start: 2022-06-30 | End: 2022-08-15 | Stop reason: SDUPTHER

## 2022-06-30 RX ORDER — DILTIAZEM HYDROCHLORIDE 120 MG/1
120 CAPSULE, COATED, EXTENDED RELEASE ORAL DAILY
Qty: 90 CAPSULE | Refills: 0 | Status: SHIPPED
Start: 2022-06-30 | End: 2022-08-15 | Stop reason: SDUPTHER

## 2022-07-11 DIAGNOSIS — G45.9 TIA (TRANSIENT ISCHEMIC ATTACK): ICD-10-CM

## 2022-07-11 RX ORDER — MECLIZINE HYDROCHLORIDE 25 MG/1
25 TABLET ORAL 2 TIMES DAILY PRN
Qty: 60 TABLET | Refills: 0 | OUTPATIENT
Start: 2022-07-11

## 2022-07-11 RX ORDER — MECLIZINE HYDROCHLORIDE 25 MG/1
TABLET ORAL
Qty: 60 TABLET | Refills: 0 | OUTPATIENT
Start: 2022-07-11

## 2022-07-11 NOTE — TELEPHONE ENCOUNTER
Refill request auto generated from pharmacy. Medication requests only to be filled upon patient visits. This request will not be honored.

## 2022-08-15 ENCOUNTER — OFFICE VISIT (OUTPATIENT)
Dept: INTERNAL MEDICINE | Age: 72
End: 2022-08-15
Payer: MEDICARE

## 2022-08-15 VITALS
HEIGHT: 74 IN | HEART RATE: 67 BPM | BODY MASS INDEX: 40.43 KG/M2 | TEMPERATURE: 97.6 F | WEIGHT: 315 LBS | OXYGEN SATURATION: 99 % | RESPIRATION RATE: 20 BRPM | DIASTOLIC BLOOD PRESSURE: 77 MMHG | SYSTOLIC BLOOD PRESSURE: 131 MMHG

## 2022-08-15 DIAGNOSIS — Z23 NEED FOR DIPHTHERIA-TETANUS-PERTUSSIS (TDAP) VACCINE: ICD-10-CM

## 2022-08-15 DIAGNOSIS — I48.20 CHRONIC ATRIAL FIBRILLATION (HCC): ICD-10-CM

## 2022-08-15 DIAGNOSIS — Z23 NEED FOR SHINGLES VACCINE: ICD-10-CM

## 2022-08-15 DIAGNOSIS — R33.8 BENIGN PROSTATIC HYPERPLASIA WITH URINARY RETENTION: ICD-10-CM

## 2022-08-15 DIAGNOSIS — N40.1 BENIGN PROSTATIC HYPERPLASIA WITH URINARY RETENTION: ICD-10-CM

## 2022-08-15 DIAGNOSIS — E11.69 DIABETES MELLITUS TYPE 2 IN OBESE (HCC): Primary | ICD-10-CM
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
## 2022-08-15 DIAGNOSIS — E78.5 HYPERLIPIDEMIA, UNSPECIFIED HYPERLIPIDEMIA TYPE: ICD-10-CM

## 2022-08-15 DIAGNOSIS — J30.9 ALLERGIC RHINITIS, UNSPECIFIED SEASONALITY, UNSPECIFIED TRIGGER: ICD-10-CM

## 2022-08-15 DIAGNOSIS — Z72.0 TOBACCO ABUSE: ICD-10-CM

## 2022-08-15 DIAGNOSIS — H61.22 IMPACTED CERUMEN OF LEFT EAR: ICD-10-CM

## 2022-08-15 DIAGNOSIS — E66.9 DIABETES MELLITUS TYPE 2 IN OBESE (HCC): Primary | ICD-10-CM

## 2022-08-15 DIAGNOSIS — Z00.00 MEDICARE ANNUAL WELLNESS VISIT, SUBSEQUENT: ICD-10-CM

## 2022-08-15 LAB — HBA1C MFR BLD: 6.5 %

## 2022-08-15 PROCEDURE — 3017F COLORECTAL CA SCREEN DOC REV: CPT | Performed by: INTERNAL MEDICINE

## 2022-08-15 PROCEDURE — G0439 PPPS, SUBSEQ VISIT: HCPCS | Performed by: INTERNAL MEDICINE

## 2022-08-15 PROCEDURE — 1123F ACP DISCUSS/DSCN MKR DOCD: CPT | Performed by: INTERNAL MEDICINE

## 2022-08-15 PROCEDURE — 3044F HG A1C LEVEL LT 7.0%: CPT | Performed by: INTERNAL MEDICINE

## 2022-08-15 RX ORDER — DILTIAZEM HYDROCHLORIDE 120 MG/1
120 CAPSULE, COATED, EXTENDED RELEASE ORAL DAILY
Qty: 90 CAPSULE | Refills: 0 | Status: SHIPPED | OUTPATIENT
Start: 2022-08-15

## 2022-08-15 RX ORDER — TAMSULOSIN HYDROCHLORIDE 0.4 MG/1
0.4 CAPSULE ORAL DAILY
Qty: 90 CAPSULE | Refills: 0 | Status: SHIPPED | OUTPATIENT
Start: 2022-08-15

## 2022-08-15 RX ORDER — LORATADINE 10 MG/1
10 TABLET ORAL DAILY
Qty: 90 TABLET | Refills: 1 | Status: SHIPPED | OUTPATIENT
Start: 2022-08-15 | End: 2023-02-11

## 2022-08-15 RX ORDER — ZOSTER VACCINE RECOMBINANT, ADJUVANTED 50 MCG/0.5
0.5 KIT INTRAMUSCULAR SEE ADMIN INSTRUCTIONS
Qty: 0.5 ML | Refills: 0 | Status: SHIPPED | OUTPATIENT
Start: 2022-08-15 | End: 2023-02-11

## 2022-08-15 RX ORDER — ATORVASTATIN CALCIUM 40 MG/1
20 TABLET, FILM COATED ORAL NIGHTLY
Qty: 45 TABLET | Refills: 0 | Status: SHIPPED | OUTPATIENT
Start: 2022-08-15

## 2022-08-15 SDOH — ECONOMIC STABILITY: TRANSPORTATION INSECURITY
IN THE PAST 12 MONTHS, HAS LACK OF TRANSPORTATION KEPT YOU FROM MEETINGS, WORK, OR FROM GETTING THINGS NEEDED FOR DAILY LIVING?: NO

## 2022-08-15 SDOH — ECONOMIC STABILITY: HOUSING INSECURITY: IN THE LAST 12 MONTHS, HOW MANY PLACES HAVE YOU LIVED?: 1

## 2022-08-15 SDOH — ECONOMIC STABILITY: HOUSING INSECURITY
IN THE LAST 12 MONTHS, WAS THERE A TIME WHEN YOU DID NOT HAVE A STEADY PLACE TO SLEEP OR SLEPT IN A SHELTER (INCLUDING NOW)?: NO

## 2022-08-15 SDOH — ECONOMIC STABILITY: INCOME INSECURITY: IN THE LAST 12 MONTHS, WAS THERE A TIME WHEN YOU WERE NOT ABLE TO PAY THE MORTGAGE OR RENT ON TIME?: NO

## 2022-08-15 SDOH — ECONOMIC STABILITY: FOOD INSECURITY: WITHIN THE PAST 12 MONTHS, THE FOOD YOU BOUGHT JUST DIDN'T LAST AND YOU DIDN'T HAVE MONEY TO GET MORE.: NEVER TRUE

## 2022-08-15 SDOH — ECONOMIC STABILITY: FOOD INSECURITY: WITHIN THE PAST 12 MONTHS, YOU WORRIED THAT YOUR FOOD WOULD RUN OUT BEFORE YOU GOT MONEY TO BUY MORE.: NEVER TRUE

## 2022-08-15 ASSESSMENT — PATIENT HEALTH QUESTIONNAIRE - PHQ9
SUM OF ALL RESPONSES TO PHQ QUESTIONS 1-9: 0
1. LITTLE INTEREST OR PLEASURE IN DOING THINGS: 0
SUM OF ALL RESPONSES TO PHQ QUESTIONS 1-9: 0
SUM OF ALL RESPONSES TO PHQ9 QUESTIONS 1 & 2: 0
2. FEELING DOWN, DEPRESSED OR HOPELESS: 0
SUM OF ALL RESPONSES TO PHQ QUESTIONS 1-9: 0
SUM OF ALL RESPONSES TO PHQ QUESTIONS 1-9: 0

## 2022-08-15 ASSESSMENT — LIFESTYLE VARIABLES
HOW MANY STANDARD DRINKS CONTAINING ALCOHOL DO YOU HAVE ON A TYPICAL DAY: 1 OR 2
HOW OFTEN DO YOU HAVE A DRINK CONTAINING ALCOHOL: MONTHLY OR LESS

## 2022-08-15 ASSESSMENT — SOCIAL DETERMINANTS OF HEALTH (SDOH): HOW HARD IS IT FOR YOU TO PAY FOR THE VERY BASICS LIKE FOOD, HOUSING, MEDICAL CARE, AND HEATING?: NOT HARD AT ALL

## 2022-08-15 NOTE — PATIENT INSTRUCTIONS
Personalized Preventive Plan for Sofie Coello - 8/15/2022  Medicare offers a range of preventive health benefits. Some of the tests and screenings are paid in full while other may be subject to a deductible, co-insurance, and/or copay. Some of these benefits include a comprehensive review of your medical history including lifestyle, illnesses that may run in your family, and various assessments and screenings as appropriate. After reviewing your medical record and screening and assessments performed today your provider may have ordered immunizations, labs, imaging, and/or referrals for you. A list of these orders (if applicable) as well as your Preventive Care list are included within your After Visit Summary for your review. Other Preventive Recommendations:    A preventive eye exam performed by an eye specialist is recommended every 1-2 years to screen for glaucoma; cataracts, macular degeneration, and other eye disorders. A preventive dental visit is recommended every 6 months. Try to get at least 150 minutes of exercise per week or 10,000 steps per day on a pedometer . Order or download the FREE \"Exercise & Physical Activity: Your Everyday Guide\" from [employer]. Call 6-178.898.5584 or search [employer] online. You need 9897-0233 mg of calcium and 3677-1083 IU of vitamin D per day. It is possible to meet your calcium requirement with diet alone, but a vitamin D supplement is usually necessary to meet this goal.  When exposed to the sun, use a sunscreen that protects against both UVA and UVB radiation with an SPF of 30 or greater. Reapply every 2 to 3 hours or after sweating, drying off with a towel, or swimming. Always wear a seat belt when traveling in a car. Always wear a helmet when riding a bicycle or motorcycle.     Start loratadine daily for allergic symptoms  Continue your other medications as prescribed, no changes made  Tdap vaccine given today  Impacted cerumen left ear to be treated today  Shingrix prescription provided  AAA screen ordered

## 2022-08-15 NOTE — PROGRESS NOTES
T dap given left deltoid . Tolerated well. VIS reviewed prior to vaccine. Left ear irrigated for small giancarlo of wax, patient tolerated well and states he can hear better.   Verbal instructions  given to patient per Dr. Kerri Solorzano

## 2022-08-15 NOTE — PROGRESS NOTES
Medicare Annual Wellness Visit    Geraldo Parra is here for Medicare AWV    Hypertension  BP today 131/77  Previously stopped on lisinopril due to cough  On Cardizem 120 mg extended release daily     History of A. fib  Asymptomatic  [id number] (HTN, TIA, age, diabetes)  Seen by EP on 3/4/2022 and recommended continuing Eliquis 5 mg twice daily and Cardizem as above     NIDDM 2  HbA1c 6.8 on 12/8/2021 > 7.6 on 4/10/2022 > 6.5 today  On Metformin 500 mg twice daily  Saw eye/foot doctors recently; no current issues     Mild LATISHA  s/p sleep study on 4/28/2022  Has follow-up on 8/16/2022 for CPAP titration    History of frontal ischemic stroke on 6/24/2018  On Lipitor 20 mg daily and Eliquis as above  Told to follow-up with neurology during last visit     Vitamin d deficiency  Vitamin D low at 25   On Vitamin d 800-1000 IU daily     History of BPH  On Flomax 0.4 mg daily     History bilateral knee pain 2/2 osteoarthritis  Bilateral knee x-ray on 12/8/2021 showed advanced tricompartment osteoarthritis with no acute findings  On Voltaren gel as needed     Healthcare maintenance  Tdap to be given today  Shingles script provided  AAA screen ordered    Assessment & Plan   Medicare annual wellness visit, subsequent  Impacted cerumen of left ear  Need for diphtheria-tetanus-pertussis (Tdap) vaccine  Need for shingles vaccine  The following orders have not been finalized:  -     zoster recombinant adjuvanted vaccine [facility]) 50 MCG/0.5ML SUSR injection  Allergic rhinitis, unspecified seasonality, unspecified trigger  The following orders have not been finalized:  -     loratadine (CLARITIN) 10 MG tablet  Tobacco abuse  The following orders have not been finalized:  -     nicotine (NICODERM CQ) 7 MG/24HR    Recommendations for Preventive Services Due: see orders and patient instructions/AVS.  Recommended screening schedule for the next 5-10 years is provided to the patient in written form: see Patient Instructions/AVS. Return for Medicare Annual Wellness Visit in 1 year. Subjective   The following acute and/or chronic problems were also addressed today:    Dry cough, rhinorrhea possibly 2/2 allergic rhinitis  6-7 mos, dry with rhinorrhea, worse at night    Patient's complete Health Risk Assessment and screening values have been reviewed and are found in Flowsheets. The following problems were reviewed today and where indicated follow up appointments were made and/or referrals ordered.     Positive Risk Factor Screenings with Interventions:    Fall Risk:  Do you feel unsteady or are you worried about falling? : (!) yes  2 or more falls in past year?: no  Fall with injury in past year?: no       Tobacco Use:  Tobacco Use: High Risk    Smoking Tobacco Use: Every Day    Smokeless Tobacco Use: Never     E-cigarette/Vaping       Questions Responses    E-cigarette/Vaping Use Never User    Start Date     Passive Exposure     Quit Date     Counseling Given     Comments           Substance Use - Tobacco Interventions:  tobacco cessation tips and resources provided         General Health and ACP:  General  In general, how would you say your health is?: Fair  In the past 7 days, have you experienced any of the following: New or Increased Pain, New or Increased Fatigue, Loneliness, Social Isolation, Stress or Anger?: (!) Yes  Select all that apply: (!) New or Increased Fatigue  Do you get the social and emotional support that you need?: Yes  Do you have a Living Will?: (!) No    Advance Directives       Power of  Living Will ACP-Advance Directive ACP-Power of     Not on File Filed on 06/25/13 Filed Not on File          Health Habits/Nutrition:  Physical Activity: Sufficiently Active    Days of Exercise per Week: 3 days    Minutes of Exercise per Session: 60 min     Have you lost any weight without trying in the past 3 months?: (!) Yes  Body mass index: (!) 43.78  Have you seen the dentist within the past year?: N/A - wear dentures  Health Habits/Nutrition Interventions:       Hearing/Vision:  Do you or your family notice any trouble with your hearing that hasn't been managed with hearing aids?: (!) Yes  Do you have difficulty driving, watching TV, or doing any of your daily activities because of your eyesight?: (!) Yes  Have you had an eye exam within the past year?: Yes  No results found. ADLs:  In the past 7 days, did you need help from others to perform any of the following everyday activities: Eating, dressing, grooming, bathing, toileting, or walking/balance?: (!) Yes  Select all that apply: (!) Dressing  In the past 7 days, did you need help from others to take care of any of the following: Laundry, housekeeping, banking/finances, shopping, telephone use, food preparation, transportation, or taking medications?: No        Objective   Vitals:    08/15/22 1016   BP: 131/77   Site: Right Upper Arm   Position: Sitting   Cuff Size: Medium Adult   Pulse: 67   Resp: 20   Temp: 97.6 °F (36.4 °C)   TempSrc: Temporal   SpO2: 99%   Weight: (!) 341 lb (154.7 kg)   Height: 6' 2\" (1.88 m)      Body mass index is 43.78 kg/m².       General Appearance: alert and oriented to person, place and time, well developed and well- nourished, in no acute distress  Skin: warm and dry, no rash or erythema  ENT: impacted cerumen left ear  Neck: supple and non-tender without mass, no thyromegaly or thyroid nodules, no cervical lymphadenopathy  Pulmonary/Chest: clear to auscultation bilaterally- no wheezes, rales or rhonchi, normal air movement, no respiratory distress  Cardiovascular: normal rate, regular rhythm, normal S1 and S2, no murmurs, rubs, clicks, or gallops, distal pulses intact, no carotid bruits  Abdomen: soft, non-tender, non-distended, normal bowel sounds, no hernia's noted in bilateral inguinal regions  Extremities: no cyanosis, clubbing or edema  Musculoskeletal: normal range of motion, no joint swelling, deformity or tenderness  Neurologic: reflexes normal and symmetric, no cranial nerve deficit, gait, coordination and speech normal; mini cog 4/5      Allergies   Allergen Reactions    Lisinopril Other (See Comments)     Cough    Toradol [Ketorolac Tromethamine] Other (See Comments)     hallucination     Prior to Visit Medications    Medication Sig Taking? Authorizing Provider   dilTIAZem (CARDIZEM CD) 120 MG extended release capsule Take 1 capsule by mouth daily Yes Doris Day MD   tamsulosin (FLOMAX) 0.4 MG capsule Take 1 capsule by mouth daily Yes Doris Day MD   atorvastatin (LIPITOR) 40 MG tablet Take 0.5 tablets by mouth nightly Yes Doris Day MD   aspirin 81 MG EC tablet Take 1 tablet by mouth daily Yes Fiordaliza Perez MD   naloxone 4 MG/0.1ML LIQD nasal spray as directed Yes Historical Provider, MD   metFORMIN (GLUCOPHAGE) 1000 MG tablet Take 1 tablet by mouth 2 times daily (with meals) Yes Orquidea Santizo MD   oxyCODONE (OXYCONTIN) 60 MG T12A extended release tablet 1 tablet Yes Historical Provider, MD   oxyCODONE (OXYCONTIN) 80 MG extended release tablet 1 tablet Yes Historical Provider, MD   apixaban (ELIQUIS) 5 MG TABS tablet Take 1 tablet by mouth 2 times daily Yes Sonia John MD   meclizine (ANTIVERT) 25 MG tablet Take 1 tablet by mouth 2 times daily as needed for Dizziness Yes Sonia John MD   blood glucose monitor strips 2 times daily. Yes Colleen Zhong MD   blood glucose monitor kit and supplies Test 2 times a day before breakfast and supper Yes Doris Day MD   oxyCODONE (ROXICODONE) 5 MG immediate release tablet Take 5 mg by mouth every 6 hours as needed. [facility] Yes Historical Provider, MD   nicotine polacrilex (NICORETTE) 2 MG gum Take 1 each by mouth as needed for Smoking cessation Maximum dose: 24 pieces/24 hours. Stop chewing and park the piece of Nicorette between your cheek/ gums.  After about a minute, when the tingling is almost gone, start chewing again. Repeat this process until the tingle is gone (about 30 minutes).   Patient not taking: No sig reported  Johnathan Licona MD   vitamin B-1 (THIAMINE) 100 MG tablet Take 1 tablet by mouth daily  Patient not taking: Reported on 8/15/2022  Mary Ann Pate MD   dicyclomine (BENTYL) 10 MG capsule Take 1 capsule by mouth every 6 hours as needed (Bowel spasms)  Chris Angulo MD       [facility] (Including outside providers/suppliers regularly involved in providing care):   Patient Care Team:  Vicky Guevara MD as PCP - General (Internal Medicine)  Wil Hubbard RN as Registered Nurse  Shelly Shine MD as Consulting Physician (Electrophysiology)     Reviewed and updated this visit:  Tobacco  Allergies  Meds  Med Hx  Surg Hx  Soc Hx  Fam Hx

## 2022-08-16 ENCOUNTER — HOSPITAL ENCOUNTER (OUTPATIENT)
Dept: SLEEP CENTER | Age: 72
Discharge: HOME | End: 2022-08-16
Payer: MEDICARE

## 2022-08-16 DIAGNOSIS — G47.33 OBSTRUCTIVE SLEEP APNEA (ADULT) (PEDIATRIC): ICD-10-CM

## 2022-08-16 PROCEDURE — 95811 POLYSOM 6/>YRS CPAP 4/> PARM: CPT

## 2022-08-17 VITALS — BODY MASS INDEX: 40.43 KG/M2 | WEIGHT: 315 LBS | HEART RATE: 63 BPM | HEIGHT: 74 IN | OXYGEN SATURATION: 90 %

## 2022-08-18 ENCOUNTER — TELEPHONE (OUTPATIENT)
Dept: INTERNAL MEDICINE | Age: 72
End: 2022-08-18

## 2022-08-18 NOTE — TELEPHONE ENCOUNTER
Left Message for patient w/ appointment reminder for US Abdominal Aorta Limited on 8/24/22 at 10am.Patient advised to NPO 6-8 hours prior.

## 2022-08-19 NOTE — PROGRESS NOTES
[address] [address]                               SLEEP STUDY REPORT    PATIENT NAME: Nora Syed                        :        1950  MED REC NO:   [id number]                            ROOM:  ACCOUNT NO:   [de-identified]                           ADMIT DATE: 2022  PROVIDER:     Daryn Forman MD    DATE OF STUDY:  2022    REFERRING PROVIDER:  Dr. Kimberly Drake. STUDY PERFORMED:  Polysomnography. INDICATION FOR POLYSOMNOGRAPHY:  Known sleep apnea - for positive airway  pressure titration. CURRENT MEDICATIONS:  Eliquis, Lipitor, Bentyl, diltiazem, Antivert,  metformin, Narcan, oxycodone, and Flomax. INTERPRETATION:  SLEEP ARCHITECTURE:  This patient had a total time in bed of 365  minutes. Total sleep time was 286 minutes. Sleep efficiency was 78%  and sleep latency was less than three minutes. REM latency was 170  minutes. SLEEP STAGING:  The patient was awake 22% of the time in bed. Stage N1  was 13% and N2 was 74% of the total sleep time. Slow wave sleep was  absent and stage REM sleep was 13% of the sleep time. RESPIRATION SUMMARY:  APNEA:  There were no apneic events. HYPOPNEA:  There were three hypopneic events, one occurred during REM  stage sleep and maximum duration was 96 seconds. APNEA/HYPOPNEA INDEX:  The apnea/hypopnea index was less than 1. POSITIVE AIRWAY PRESSURE TITRATION:  This patient was started on CPAP of  six which was gradually increased to a CPAP of 10. At that level, the  patient slept for 22 minutes in REM stage sleep and 138 minutes in  non-REM stage sleep. There were no apneic or hypopneic events. AROUSAL ANALYSIS:  There were 46 arousals/awakenings, the sleep  disruption index was normal.    LIMB MOVEMENT SUMMARY:  There were nine limb movements, the limb  movement index was normal.    OXYGEN SATURATION:  Average oxygen saturation while awake was 93%. Lowest saturation was 88%. The patient spent 11% of the time with  saturation less than 90%. HEART RATE SUMMARY:  Average heart rate while awake was 66 beats per  minute. Maximum heart rate during sleep was 78 beats per minute and  minimum heart rate was 54 beats per minute. There were rare PACs , not  associated with respiratory event. MISCELLANEOUS:  Pep Sleepiness Scale score is 2/24. There was no  snoring or bruxism noted. IMPRESSION:  1. Known sleep apnea. 2.  Good to optimal titration with CPAP. 3.  Snoring eliminated. 4.  Good oxygen saturation. 5.  Rare PACs. DISCUSSION:  On a previous study, this patient was found to have  obstructive sleep apnea. With titration of CPAP, which the patient  tolerated well, good to optimal results were achieved with elimination  of snoring, normalization of oxygen saturation and, on CPAP of 10,  elimination of respiratory events. SUGGESTIONS:  1.  Dr. Durga Reddy to discuss the results of study with the patient. 2.  The patient should have CPAP at 10 cm of water pressure. 3.  The patient should use a ResMed AirFit F20 full facemask, size large  with heated humidification.         Kajal Jha MD  Diplomat of Sleep Medicine    D: 08/18/2022 13:57:40       T: 08/18/2022 14:00:08     [email or web address]  Job#: [id number]     Doc#: [id number]    CC:

## 2022-09-02 DIAGNOSIS — G47.33 OBSTRUCTIVE SLEEP APNEA: Primary | ICD-10-CM

## 2022-10-04 ENCOUNTER — SOCIAL WORK (OUTPATIENT)
Dept: INTERNAL MEDICINE | Age: 72
End: 2022-10-04

## 2022-10-04 ENCOUNTER — OFFICE VISIT (OUTPATIENT)
Dept: INTERNAL MEDICINE | Age: 72
End: 2022-10-04
Payer: MEDICARE

## 2022-10-04 VITALS
DIASTOLIC BLOOD PRESSURE: 66 MMHG | TEMPERATURE: 96.8 F | SYSTOLIC BLOOD PRESSURE: 135 MMHG | RESPIRATION RATE: 20 BRPM | OXYGEN SATURATION: 95 % | HEART RATE: 64 BPM

## 2022-10-04 DIAGNOSIS — Z78.9 NEED FOR FOLLOW-UP BY SOCIAL WORKER: Primary | ICD-10-CM

## 2022-10-04 DIAGNOSIS — K59.00 CONSTIPATION, UNSPECIFIED CONSTIPATION TYPE: ICD-10-CM

## 2022-10-04 PROCEDURE — 99212 OFFICE O/P EST SF 10 MIN: CPT | Performed by: STUDENT IN AN ORGANIZED HEALTH CARE EDUCATION/TRAINING PROGRAM

## 2022-10-04 PROCEDURE — G8427 DOCREV CUR MEDS BY ELIG CLIN: HCPCS | Performed by: STUDENT IN AN ORGANIZED HEALTH CARE EDUCATION/TRAINING PROGRAM

## 2022-10-04 PROCEDURE — 99214 OFFICE O/P EST MOD 30 MIN: CPT | Performed by: STUDENT IN AN ORGANIZED HEALTH CARE EDUCATION/TRAINING PROGRAM

## 2022-10-04 PROCEDURE — 3017F COLORECTAL CA SCREEN DOC REV: CPT | Performed by: STUDENT IN AN ORGANIZED HEALTH CARE EDUCATION/TRAINING PROGRAM

## 2022-10-04 PROCEDURE — 1123F ACP DISCUSS/DSCN MKR DOCD: CPT | Performed by: STUDENT IN AN ORGANIZED HEALTH CARE EDUCATION/TRAINING PROGRAM

## 2022-10-04 PROCEDURE — 4004F PT TOBACCO SCREEN RCVD TLK: CPT | Performed by: STUDENT IN AN ORGANIZED HEALTH CARE EDUCATION/TRAINING PROGRAM

## 2022-10-04 PROCEDURE — G8484 FLU IMMUNIZE NO ADMIN: HCPCS | Performed by: STUDENT IN AN ORGANIZED HEALTH CARE EDUCATION/TRAINING PROGRAM

## 2022-10-04 PROCEDURE — G8417 CALC BMI ABV UP PARAM F/U: HCPCS | Performed by: STUDENT IN AN ORGANIZED HEALTH CARE EDUCATION/TRAINING PROGRAM

## 2022-10-04 RX ORDER — POLYETHYLENE GLYCOL 3350 17 G/17G
17 POWDER, FOR SOLUTION ORAL DAILY PRN
Qty: 510 G | Refills: 0 | Status: SHIPPED | OUTPATIENT
Start: 2022-10-04 | End: 2022-11-03

## 2022-10-04 NOTE — PROGRESS NOTES
[address]  [facility]    Attending Physician Statement:  Columba Shrestha M.D., F.A.C.P. I have discussed the case, including pertinent history and exam findings with the resident. I agree with the assessment, plan and orders as documented by the resident. Patient is seen for fu visit today. Last office notes reviewed, relative labs and imaging. Health maintenance issues of vaccinations, depression screening, tobacco cessation etc... covered  GI issues, feels constipated  - add glycolax  (He feels bloated -- also bentyl use in past)  /66 (Site: Left Upper Arm, Position: Sitting, Cuff Size: Large Adult)   Pulse 64   Temp 96.8 °F (36 °C) (Temporal)   Resp 20   SpO2 95%   Doubt cardizem induced - but will reassess    Noted flu shot last month, hives sp  No recent fever  He had concerns about viral infection    Metformin not taking as prescribed - blood sugars uncontrolled  (\"Shouldn't thicken blood\"=-- advised metfromin)  30min  Remainder of medical problems as per resident note.

## 2022-10-04 NOTE — PROGRESS NOTES
[facility] Medicine      SUBJECTIVE:  Kimmie Moss (:  1950) is a 73 y.o. male here for evaluation of the following chief complaint(s):  Abdominal Pain (Left sided pain and lower abd pain), Constipation ( Constipation x 1 month. Patient states that he moves small amount every 3 days), Bloated, Diabetes (Patient bs this morning was 169), and Other (Patient states he has hives after the flu vaccine for 4 or 5 days)    Acute Visit  Patient is a 71yo M who is presenting today for various acute concerns. Patient has concerns of what he feels is \"up and down blood glucose. \" Patient states that this has been going on for about 1 month. This has started since patient has self tapered himself off of his Metformin. Patient is to take Metformin 1000mg BID but is currently taking 1000mg in the AM and 500mg in the PM. Patient states that he was reading online that Metformin \"makes my blood thicker. \"   Patient educated on effects of metformin and that his blood glucose will be more stable if he is compliant with medications  Patient stated agreement to resume Metformin 1000mg BID as prescribed  Patient educated that resuming metformin can help with his feelings of being bloated and \"backed up\"  Patient also has concerns of bloating and constipation. This has been going on for roughly 1 month around the time he stopped taking Metformin as prescribed. Patient educated to resume metformin as prescribed as this can help with bowel movement  Patient ordered Miralax to take PRN for concerns of constipation    Review of Systems   Constitutional: Negative. HENT: Negative. Eyes: Negative. Respiratory: Negative. Cardiovascular: Negative. Gastrointestinal: Negative. Feelings of abdominal bloating and fullness   Endocrine: Negative. Genitourinary: Negative. Musculoskeletal: Negative. Skin: Negative. Allergic/Immunologic: Negative. Neurological: Negative. Hematological: Negative. Psychiatric/Behavioral: Negative. Current Outpatient Medications on File Prior to Visit   Medication Sig Dispense Refill    loratadine (CLARITIN) 10 MG tablet Take 1 tablet by mouth in the morning. 90 tablet 1    dilTIAZem (CARDIZEM CD) 120 MG extended release capsule Take 1 capsule by mouth in the morning. 90 capsule 0    tamsulosin (FLOMAX) 0.4 MG capsule Take 1 capsule by mouth in the morning. 90 capsule 0    atorvastatin (LIPITOR) 40 MG tablet Take 0.5 tablets by mouth nightly 45 tablet 0    metFORMIN (GLUCOPHAGE) 1000 MG tablet Take 1 tablet by mouth in the morning and 1 tablet in the evening. Take with meals. (Patient taking differently: Take 1,000 mg by mouth 2 times daily (with meals) Patient taking 1000 mg in the am and 500 mg at nightly) 180 tablet 1    apixaban (ELIQUIS) 5 MG TABS tablet Take 1 tablet by mouth 2 times daily 180 tablet 1    oxyCODONE (OXYCONTIN) 60 MG T12A extended release tablet 1 tablet      oxyCODONE (OXYCONTIN) 80 MG extended release tablet 1 tablet      meclizine (ANTIVERT) 25 MG tablet Take 1 tablet by mouth 2 times daily as needed for Dizziness 60 tablet 0    blood glucose monitor strips 2 times daily. 100 strip 1    blood glucose monitor kit and supplies Test 2 times a day before breakfast and supper 1 kit 0    oxyCODONE (ROXICODONE) 5 MG immediate release tablet Take 5 mg by mouth every 6 hours as needed. [facility] through [facility]      nicotine (NICODERM CQ) 7 MG/24HR Place 1 patch onto the skin in the morning for 14 days. 14 patch 0    zoster recombinant adjuvanted vaccine [facility]) 50 MCG/0.5ML SUSR injection Inject 0.5 mLs into the muscle See Admin Instructions 1 dose now and repeat in 2-6 months 0.5 mL 0    naloxone 4 MG/0.1ML LIQD nasal spray as directed      nicotine polacrilex (NICORETTE) 2 MG gum Take 1 each by mouth as needed for Smoking cessation Maximum dose: 24 pieces/24 hours.  Stop chewing and park the piece of Nicorette between your cheek/ gums. After about a minute, when the tingling is almost gone, start chewing again. Repeat this process until the tingle is gone (about 30 minutes). (Patient not taking: No sig reported) 110 each 3    vitamin B-1 (THIAMINE) 100 MG tablet Take 1 tablet by mouth daily (Patient not taking: No sig reported) 30 tablet 3    dicyclomine (BENTYL) 10 MG capsule Take 1 capsule by mouth every 6 hours as needed (Bowel spasms) 20 capsule 0     Current Facility-Administered Medications on File Prior to Visit   Medication Dose Route Frequency Provider Last Rate Last Admin    promethazine (PHENERGAN) tablet 12.5 mg  12.5 mg Oral Q6H PRN Melly Mendes MD        Or    ondansetron [facility]) injection 4 mg  4 mg IntraVENous Q6H PRN Melly Mendes MD        atorvastatin (LIPITOR) tablet 80 mg  80 mg Oral Nightly Melly Mendes MD        sodium chloride flush 0.9 % injection 10 mL  10 mL IntraVENous 2 times per day Melly Mendes MD        sodium chloride flush 0.9 % injection 10 mL  10 mL IntraVENous PRN Melly Mendes MD        acetaminophen (TYLENOL) tablet 650 mg  650 mg Oral Q4H PRN Melly Mendes MD           OBJECTIVE:    VS:   Vitals:    10/04/22 1326   BP: 135/66   Site: Left Upper Arm   Position: Sitting   Cuff Size: Large Adult   Pulse: 64   Resp: 20   Temp: 96.8 °F (36 °C)   TempSrc: Temporal   SpO2: 95%     Physical Exam  Constitutional:       Appearance: Normal appearance. HENT:      Head: Normocephalic and atraumatic. Nose: Nose normal.   Eyes:      Extraocular Movements: Extraocular movements intact. Conjunctiva/sclera: Conjunctivae normal.      Pupils: Pupils are equal, round, and reactive to light. Cardiovascular:      Rate and Rhythm: Normal rate and regular rhythm. Pulses: Normal pulses. Heart sounds: Normal heart sounds. Pulmonary:      Effort: Pulmonary effort is normal.      Breath sounds: Normal breath sounds.    Abdominal:      General: Bowel sounds are normal. Palpations: Abdomen is soft. Musculoskeletal:         General: Normal range of motion. Cervical back: Normal range of motion and neck supple. Skin:     General: Skin is warm and dry. Capillary Refill: Capillary refill takes less than 2 seconds. Neurological:      General: No focal deficit present. Mental Status: He is alert and oriented to person, place, and time. ASSESSMENT/PLAN:  1. Need for follow-up by   -     [facility] Referral to Social Work (Primary Care Only)  2. Constipation, unspecified constipation type  -     polyethylene glycol (GLYCOLAX) 17 GM/SCOOP powder; Take 17 g by mouth daily as needed (for constipation), Disp-510 g, R-0Normal     RTC:  Return in about 2 months (around 12/4/2022). RTC in 2 months with PCP and readdressing medication compliance     I have reviewed my findings and recommendations with Johan Roberts and Dr. Dorina Lloyd.     Rosette Stone MD   10/5/2022 10:01 AM

## 2022-10-04 NOTE — PATIENT INSTRUCTIONS
Dear Vitaliy Jacobson,        Thank you for coming to your appointment today. I hope we have addressed all of your needs. Please make sure to do the following:  - Continue your medications as listed. - We will see each other again in 2 months    Call for a sooner appointment if you develop any acute concerns    Have a great day!         Sincerely,  Roberto Mckinnon M.D PGY-2  10/4/2022  2:29 PM

## 2022-10-05 ENCOUNTER — TELEPHONE (OUTPATIENT)
Dept: INTERNAL MEDICINE | Age: 72
End: 2022-10-05

## 2022-10-05 ASSESSMENT — ENCOUNTER SYMPTOMS
ALLERGIC/IMMUNOLOGIC NEGATIVE: 1
GASTROINTESTINAL NEGATIVE: 1
RESPIRATORY NEGATIVE: 1
EYES NEGATIVE: 1

## 2022-10-05 NOTE — TELEPHONE ENCOUNTER
----- Message from Stephanie Purcell sent at 9/29/2022 10:17 AM EDT -----  Subject: Appointment Request    Reason for Call: Established Patient Appointment needed: Routine Existing   Condition Follow Up    QUESTIONS    Reason for appointment request? Available appointments did not meet   patient need     Additional Information for Provider? Pt would like to speak to his dr   about having a shingles shot and also needs his opinion about taking the   Cortisone injection in his back at the Pain management office. Pt states   that these injections thicken his blood because they want him to stop   taking the Eliquis. Fear of having another stroke. Would like a call back.    Try mobile first and can leave message on home number if can not reach   mobile.  ---------------------------------------------------------------------------  --------------  Gabriella Andrade INFO  [phone]; OK to leave message on voicemail  ---------------------------------------------------------------------------  --------------  SCRIPT ANSWERS  COVID Screen: Bayron Paredes

## 2022-10-06 ENCOUNTER — SOCIAL WORK (OUTPATIENT)
Dept: INTERNAL MEDICINE | Age: 72
End: 2022-10-06
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
## 2022-10-06 NOTE — PROGRESS NOTES
Pt to [facility] office as scheduled. Began assisting pt with composing letter to attempt recovery of garnished social security income. Pt decided mid way through encounter to cease letter since he only has about $200 left to pay. Reminded pt that this recovery attempt can continue after complete payment if he chooses to pursue. Pt requested info re: student loan forgiveness ; obtained info from [employer] website confirming as of Sept 2021,match of receipt of social security disability thru their record reviews would forgive debt.   Provided pt with contact info and specifically phone number  to inquire further

## 2022-11-01 NOTE — TELEPHONE ENCOUNTER
31yo  at 20w1d.  Has MFM U/S on 22.  Beginning to feel movement.  No OB concerns.  RTC 4 weeks   Spoke with patient regarding symptoms of cough, congestion and sore throat. Patient denies fever. SOB on exertion. Patient advised to be seen at urgent walk in clinic for evaluation and testing. locations of several clinics in area discussed with patient. patient instructed to go to ED if any acute change in symptoms or breathing.   Patient verbalizes understanding and will follow up at [facility] urgent care

## 2022-11-30 ENCOUNTER — TELEPHONE (OUTPATIENT)
Dept: INTERNAL MEDICINE | Age: 72
End: 2022-11-30

## 2022-11-30 NOTE — TELEPHONE ENCOUNTER
Pt left message stating he is getting a shot in his back and would like to know if he would need to hold eliquis prior , please advise.

## 2022-11-30 NOTE — TELEPHONE ENCOUNTER
Patient should hold his eliquis for 2 days prior to injection and restart his eliquis on day after procedure.   Thank you    Samina Cardoso

## 2022-11-30 NOTE — TELEPHONE ENCOUNTER
----- Message from Luis Miguel Ramirez sent at 11/30/2022 11:22 AM EST -----  Subject: Message to Provider    QUESTIONS  Information for Provider? pt said he is getting a shot in his back for   pain and doesn't know if he needs to come off his eliquis 5 mgs - asking   for a callback   ---------------------------------------------------------------------------  --------------  [address]  [phone]; Do not leave any message, patient will call back for answer  ---------------------------------------------------------------------------  --------------  SCRIPT ANSWERS  Relationship to Patient?  Self

## 2022-12-14 ENCOUNTER — OFFICE VISIT (OUTPATIENT)
Dept: INTERNAL MEDICINE | Age: 72
End: 2022-12-14
Payer: MEDICARE

## 2022-12-14 VITALS
BODY MASS INDEX: 40.43 KG/M2 | HEART RATE: 78 BPM | HEIGHT: 74 IN | DIASTOLIC BLOOD PRESSURE: 63 MMHG | SYSTOLIC BLOOD PRESSURE: 129 MMHG | OXYGEN SATURATION: 96 % | WEIGHT: 315 LBS | TEMPERATURE: 97.2 F | RESPIRATION RATE: 16 BRPM

## 2022-12-14 DIAGNOSIS — R35.1 BENIGN PROSTATIC HYPERPLASIA WITH NOCTURIA: ICD-10-CM

## 2022-12-14 DIAGNOSIS — E66.9 DIABETES MELLITUS TYPE 2 IN OBESE (HCC): ICD-10-CM

## 2022-12-14 DIAGNOSIS — Z12.5 ENCOUNTER FOR SCREENING FOR MALIGNANT NEOPLASM OF PROSTATE: ICD-10-CM

## 2022-12-14 DIAGNOSIS — E78.5 HYPERLIPIDEMIA, UNSPECIFIED HYPERLIPIDEMIA TYPE: ICD-10-CM

## 2022-12-14 DIAGNOSIS — I48.20 CHRONIC ATRIAL FIBRILLATION (HCC): ICD-10-CM

## 2022-12-14 DIAGNOSIS — M54.16 LUMBAR RADICULOPATHY: ICD-10-CM

## 2022-12-14 DIAGNOSIS — E55.9 VITAMIN D DEFICIENCY: ICD-10-CM

## 2022-12-14 DIAGNOSIS — Z13.9 ENCOUNTER FOR SCREENING INVOLVING SOCIAL DETERMINANTS OF HEALTH (SDOH): ICD-10-CM

## 2022-12-14 DIAGNOSIS — E11.69 DIABETES MELLITUS TYPE 2 IN OBESE (HCC): ICD-10-CM

## 2022-12-14 DIAGNOSIS — I10 ESSENTIAL HYPERTENSION: Primary | ICD-10-CM

## 2022-12-14 DIAGNOSIS — N40.1 BENIGN PROSTATIC HYPERPLASIA WITH NOCTURIA: ICD-10-CM

## 2022-12-14 PROCEDURE — G8417 CALC BMI ABV UP PARAM F/U: HCPCS | Performed by: INTERNAL MEDICINE

## 2022-12-14 PROCEDURE — 3074F SYST BP LT 130 MM HG: CPT | Performed by: INTERNAL MEDICINE

## 2022-12-14 PROCEDURE — 3078F DIAST BP <80 MM HG: CPT | Performed by: INTERNAL MEDICINE

## 2022-12-14 PROCEDURE — G8427 DOCREV CUR MEDS BY ELIG CLIN: HCPCS | Performed by: INTERNAL MEDICINE

## 2022-12-14 PROCEDURE — 1123F ACP DISCUSS/DSCN MKR DOCD: CPT | Performed by: INTERNAL MEDICINE

## 2022-12-14 PROCEDURE — 4004F PT TOBACCO SCREEN RCVD TLK: CPT | Performed by: INTERNAL MEDICINE

## 2022-12-14 PROCEDURE — 3044F HG A1C LEVEL LT 7.0%: CPT | Performed by: INTERNAL MEDICINE

## 2022-12-14 PROCEDURE — 99212 OFFICE O/P EST SF 10 MIN: CPT | Performed by: INTERNAL MEDICINE

## 2022-12-14 PROCEDURE — 2022F DILAT RTA XM EVC RTNOPTHY: CPT | Performed by: INTERNAL MEDICINE

## 2022-12-14 PROCEDURE — 99213 OFFICE O/P EST LOW 20 MIN: CPT | Performed by: INTERNAL MEDICINE

## 2022-12-14 PROCEDURE — 3017F COLORECTAL CA SCREEN DOC REV: CPT | Performed by: INTERNAL MEDICINE

## 2022-12-14 PROCEDURE — G8484 FLU IMMUNIZE NO ADMIN: HCPCS | Performed by: INTERNAL MEDICINE

## 2022-12-14 RX ORDER — ECHINACEA 400 MG
CAPSULE ORAL
COMMUNITY

## 2022-12-14 RX ORDER — MELATONIN
1000 DAILY
Qty: 90 TABLET | Refills: 1 | Status: SHIPPED | OUTPATIENT
Start: 2022-12-14

## 2022-12-14 RX ORDER — TAMSULOSIN HYDROCHLORIDE 0.4 MG/1
0.4 CAPSULE ORAL DAILY
Qty: 90 CAPSULE | Refills: 1 | Status: SHIPPED | OUTPATIENT
Start: 2022-12-14

## 2022-12-14 RX ORDER — LATANOPROST 50 UG/ML
SOLUTION/ DROPS OPHTHALMIC
COMMUNITY
Start: 2022-11-16

## 2022-12-14 RX ORDER — THIAMINE MONONITRATE (VIT B1) 100 MG
100 TABLET ORAL DAILY
Qty: 90 TABLET | Refills: 1 | Status: SHIPPED | OUTPATIENT
Start: 2022-12-14

## 2022-12-14 RX ORDER — DILTIAZEM HYDROCHLORIDE 120 MG/1
120 CAPSULE, COATED, EXTENDED RELEASE ORAL DAILY
Qty: 90 CAPSULE | Refills: 1 | Status: SHIPPED | OUTPATIENT
Start: 2022-12-14

## 2022-12-14 RX ORDER — DICYCLOMINE HYDROCHLORIDE 10 MG/1
10 CAPSULE ORAL EVERY 6 HOURS PRN
Qty: 20 CAPSULE | Refills: 0 | Status: CANCELLED | OUTPATIENT
Start: 2022-12-14 | End: 2022-12-19

## 2022-12-14 RX ORDER — CHLORAL HYDRATE 500 MG
CAPSULE ORAL DAILY
COMMUNITY

## 2022-12-14 RX ORDER — ATORVASTATIN CALCIUM 40 MG/1
40 TABLET, FILM COATED ORAL NIGHTLY
Qty: 90 TABLET | Refills: 1 | Status: SHIPPED | OUTPATIENT
Start: 2022-12-14

## 2022-12-14 ASSESSMENT — ENCOUNTER SYMPTOMS
CONSTIPATION: 0
DIARRHEA: 0
WHEEZING: 0
SORE THROAT: 0
SINUS PAIN: 0
BACK PAIN: 1
NAUSEA: 0
RHINORRHEA: 0
STRIDOR: 0
COUGH: 0
SHORTNESS OF BREATH: 0
ABDOMINAL PAIN: 0
VOMITING: 0
CHEST TIGHTNESS: 0
SINUS PRESSURE: 0

## 2022-12-14 NOTE — PATIENT INSTRUCTIONS
It was a pleasure seeing you today. Continue all medications as prescribed. No changes. Complete lab work prior to next visit in 4 months.      Have a great day!     -Dr. Woodford Boast

## 2022-12-14 NOTE — PROGRESS NOTES
[address]  InternalMedicine Residency Program  ACC Note      SUBJECTIVE:  CC: had concerns including Diabetes and Follow-up. Yang He presented to the [facility] for a 4 month follow up. Patient seen today for follow-up. Last seen 10/4/2022 by Dr. Rafaela Solorzano for an acute visit patient. Last seen by his PCP for AW[facility] Dr. Dominik Yu on 8/15/2022. PMHx of paroxysmal atrial fibrillation, hypertension, diabetes mellitus, left frontal lobe CVA, PFO status post closure 2/14/19, obesity, LATISHA and BPH, lumbar radiculopathy. Acute concerns: Patient is very emotional today regarding having been told by his pain management doctor that in order to get the radiofrequency for his lumbar radiculopathy he will have to be off Eliquis. However patient feels very anxious ever going off his Eliquis because he feels his stroke symptoms will come back and he will have another stroke. Patient spoke about his concerns for 20 minutes. Discussed with the patient that he he may say no to the procedure if he is not willing to go off the Eliquis but then he has to be okay with other modalities offered to him to help his pain, however that is up to his pain specialist doctor. Patient is agreeable and understands. Otherwise patient has no concerns. He is compliant with all his medications. He does report concerns regarding finances and concerns about his Social Security. Requesting to speak to Victoriano Claudio    Hypertension  BP today 129/63  Previously stopped on lisinopril due to cough  On Cardizem 120 mg extended release daily     History of A. fib  Asymptomatic  [id number] (HTN, TIA, age, diabetes)  Seen by EP on 3/4/2022 and recommended continuing Eliquis 5 mg twice daily and Cardizem as above     NIDDM 2  HbA1c 6.8 on 12/8/2021 > 7.6 on 4/10/2022 > 6.5 8/15/2022. Will get one next time. Blood work ordered for next time in 4 months. CBC, BMP, hemoglobin A1c.   Vit D level and PSA screening  On Metformin 1000 mg twice daily  FBG . Controlled. No s/s of hypoglycemia. Saw eye/foot doctors recently; no current issues     Mild LATISHA  s/p sleep study on 4/28/2022  CPAP at 10 cm of water pressure. History of frontal ischemic stroke on 6/24/2018  On Lipitor 20 mg daily and Eliquis as above  Told to follow-up with neurology. Vitamin D deficiency  Vitamin D low at 25 in 12/2021  On Vitamin d 800-1000 IU daily  Repeat in 4/2023      History of BPH  On Flomax 0.4 mg daily  Patient was seen by urology ([facility]) in the past.  He is status post left orchiectomy, pathology did not show cancer done summer 2013. Discussed with patient to check for masses etc. He does not want to see Urology. PSA April 2023     History bilateral knee pain 2/2 osteoarthritis  Bilateral knee x-ray on 12/8/2021 showed advanced tricompartment osteoarthritis with no acute findings  On Voltaren gel as needed     Lumbar radiculopathy  - MRI 2020       1. Mild central canal stenosis at L3-4.   2. Multilevel neural foraminal stenoses, worst (moderate-to-severe) at   the L5-S1 levels. - Seen Dr. Isabell Ortiz 10 yrs ago, recommended surgery   - Lumbar nerve blocks (almost 30) & radiofrequency in 2014 by Dr. Juan Carlos Barrios (pain clinic) & finished PT  - On oxycodone per Pain clinic. Follows with Dr. Rebecca Larson. Healthcare maintenance  Tdap given 8/2022  Shingles script provided 8/2022  AAA screen ordered prior - pending     Review of Systems   Constitutional:  Negative for chills, diaphoresis, fatigue and fever. HENT:  Negative for rhinorrhea, sinus pressure, sinus pain and sore throat. Respiratory:  Negative for cough, chest tightness, shortness of breath, wheezing and stridor. Cardiovascular:  Negative for chest pain, palpitations and leg swelling. Gastrointestinal:  Negative for abdominal pain, constipation, diarrhea, nausea and vomiting. Endocrine: Negative for polydipsia and polyuria.    Genitourinary:  Negative for dysuria and frequency. Musculoskeletal:  Positive for arthralgias, back pain and myalgias. Neurological:  Negative for dizziness, tremors, syncope, weakness and headaches. Psychiatric/Behavioral:  Negative for agitation. The patient is not nervous/anxious. Outpatient Medications Marked as Taking for the 12/14/22 encounter (Office Visit) with Avril Sotelo, DO   Medication Sig Dispense Refill    dilTIAZem (CARDIZEM CD) 120 MG extended release capsule Take 1 capsule by mouth daily 90 capsule 1    atorvastatin (LIPITOR) 40 MG tablet Take 1 tablet by mouth nightly 90 tablet 1    vitamin B-1 (THIAMINE) 100 MG tablet Take 1 tablet by mouth daily 90 tablet 1    tamsulosin (FLOMAX) 0.4 MG capsule Take 1 capsule by mouth daily 90 capsule 1    Omega-3 Fatty Acids (FISH OIL) 1000 MG capsule Take by mouth daily otc      Flaxseed, Linseed, (FLAXSEED OIL) 1000 MG CAPS Take by mouth otc      metFORMIN (GLUCOPHAGE) 1000 MG tablet Take 1 tablet by mouth 2 times daily (with meals) 180 tablet 1    apixaban (ELIQUIS) 5 MG TABS tablet Take 1 tablet by mouth 2 times daily Take 1 tablet by mouth 2 times daily 180 tablet 1    vitamin D3 (CHOLECALCIFEROL) 25 MCG (1000 UT) TABS tablet Take 1 tablet by mouth daily 90 tablet 1    nicotine polacrilex (NICORETTE) 2 MG gum Take 1 each by mouth as needed for Smoking cessation Maximum dose: 24 pieces/24 hours. Stop chewing and park the piece of Nicorette between your cheek/ gums. After about a minute, when the tingling is almost gone, start chewing again. Repeat this process until the tingle is gone (about 30 minutes). 110 each 3    oxyCODONE (OXYCONTIN) 60 MG T12A extended release tablet 1 tablet      oxyCODONE (OXYCONTIN) 80 MG extended release tablet 1 tablet      meclizine (ANTIVERT) 25 MG tablet Take 1 tablet by mouth 2 times daily as needed for Dizziness 60 tablet 0    blood glucose monitor strips 2 times daily.  100 strip 1    dicyclomine (BENTYL) 10 MG capsule Take 1 capsule by mouth every 6 hours as needed (Bowel spasms) 20 capsule 0    blood glucose monitor kit and supplies Test 2 times a day before breakfast and supper 1 kit 0    oxyCODONE (ROXICODONE) 5 MG immediate release tablet Take 5 mg by mouth every 6 hours as needed. [facility] through [facility]         I have reviewed all pertinent PMHx, PSHx, FamHx, SocialHx, medications, and allergiesand updated history as appropriate. OBJECTIVE:    VS: /63 (Site: Left Upper Arm, Position: Sitting, Cuff Size: Large Adult)   Pulse 78   Temp 97.2 °F (36.2 °C) (Temporal)   Resp 16   Ht 6' 2\" (1.88 m)   Wt (!) 341 lb (154.7 kg)   SpO2 96%   BMI 43.78 kg/m²   Physical Exam  Vitals and nursing note reviewed. Constitutional:       General: He is not in acute distress. Appearance: Normal appearance. He is well-developed. He is obese. He is not ill-appearing. HENT:      Head: Normocephalic and atraumatic. Right Ear: External ear normal.      Left Ear: External ear normal.   Eyes:      General: No scleral icterus. Conjunctiva/sclera: Conjunctivae normal.      Pupils: Pupils are equal, round, and reactive to light. Neck:      Vascular: No carotid bruit. Cardiovascular:      Rate and Rhythm: Normal rate and regular rhythm. Pulses: Normal pulses. Posterior tibial pulses are 2+ on the right side and 2+ on the left side. Heart sounds: Normal heart sounds, S1 normal and S2 normal. No murmur heard. Pulmonary:      Effort: Pulmonary effort is normal. No respiratory distress. Breath sounds: No decreased breath sounds, wheezing, rhonchi or rales. Abdominal:      General: Bowel sounds are normal. There is no distension. Palpations: Abdomen is soft. There is no mass. Tenderness: There is no abdominal tenderness. There is no guarding or rebound. Skin:     General: Skin is warm and dry. Capillary Refill: Capillary refill takes less than 2 seconds.       Coloration: Skin is not jaundiced or pale.   Neurological:      Mental Status: He is alert. PLAN:  1. Chronic atrial fibrillation (HCC)  Stable. Asymptomatic.   - dilTIAZem (CARDIZEM CD) 120 MG extended release capsule; Take 1 capsule by mouth daily  Dispense: 90 capsule; Refill: 1  - apixaban (ELIQUIS) 5 MG TABS tablet; Take 1 tablet by mouth 2 times daily Take 1 tablet by mouth 2 times daily  Dispense: 180 tablet; Refill: 1    2. Hyperlipidemia, unspecified hyperlipidemia type  - atorvastatin (LIPITOR) 40 MG tablet; Take 1 tablet by mouth nightly  Dispense: 90 tablet; Refill: 1    3. Lumbar Radiculopathy   - MRI 2020       1. Mild central canal stenosis at L3-4.   2. Multilevel neural foraminal stenoses, worst (moderate-to-severe) at   the L5-S1 levels. - Seen Dr. Li Limon 10 yrs ago, recommended surgery   - Lumbar nerve blocks (almost 30) & radiofrequency in 2014 by Dr. Darian Shah (pain clinic) & finished PT  - On oxycodone per Pain clinic. Follows with Dr. Jaime Lemus. 4. Benign prostatic hyperplasia with nocturia   No urinary complaints. - tamsulosin (FLOMAX) 0.4 MG capsule; Take 1 capsule by mouth daily  Dispense: 90 capsule; Refill: 1  - PSA Screening; Future    5. Diabetes mellitus type 2 in obese (HCC)  - metFORMIN (GLUCOPHAGE) 1000 MG tablet; Take 1 tablet by mouth 2 times daily (with meals)  Dispense: 180 tablet; Refill: 1  - Basic Metabolic Panel; Future  - CBC with Auto Differential; Future  - Hemoglobin A1C; Future  - MICROALBUMIN / CREATININE URINE RATIO; Future    6. Essential hypertension  Well controlled today. - MICROALBUMIN / CREATININE URINE RATIO; Future    7. Vitamin D deficiency  Continue vit D supplements 1000 IU daily. - Vitamin D 25 Hydroxy; Future    8. Encounter for screening for malignant neoplasm of prostate   - PSA Screening; Future    9. Encounter for screening involving social determinants of health (SDoH)  - [facility] Referral to Social Work (Primary Care Only)    RTC: 4 months with blood work.      I have reviewed my findings and recommendations with Johan Roberts and Dr Roxana Riley, DO PGY-3  12/14/2022 11:13 AM

## 2022-12-14 NOTE — PROGRESS NOTES
[address]  Internal Medicine Residency Clinic    Attending Physician Statement  I have discussed the case, including pertinent history and exam findings with the resident physician. I agree with the assessment, plan and orders as documented by the resident. I have reviewed all pertinent PMHx, PSHx, FamHx, SocialHx, medications, and allergies and updated history as appropriate. Patient here for routine follow up of medical problems. Atrial Fibrillation  -patient to continue eliquis and cardizem  -if patient needs to have any spinal surgery or injections then patient needs to stop eliquis for 3 days and restart it 48 hours after the procedure has been completed; patient states that he does not want to stop his eliquis; discussed that if he wants procedure that he needs to hold his eliquis prior to procedure  -if he does not want proceudre 2/2 concerns for stroke while being off eliquis he should continue discussion with his pain management physician     Chronic Back Pain  -pain control per [facility] evaluated by [employer] during office visit today     NIDDM2  -blood glucose   -continue metformin 1000 mg BID  -follows with podiatry and ophthalmology  -A1c 6.5    Hx of orchiectomy   -patient does not want to see urologist; discussed with patient need for self checks to confirm no masses   -patient understands that he should followup with his urologist     HCM  -blood work   -patient sees foot and eye physician     Remainder of medical problems as per resident note.     Gertrude Brown DO  12/14/2022 9:49 AM    Encounter time including independent chart review, discussion with patient, interpreting test results and/or external communications: 30'

## 2022-12-15 ENCOUNTER — TELEPHONE (OUTPATIENT)
Dept: INTERNAL MEDICINE | Age: 72
End: 2022-12-15

## 2022-12-15 NOTE — TELEPHONE ENCOUNTER
Phone contact made with pt to follow up on referral from Dr Tamiko Herrera on 12.14.22 re: concerns of social security income. LSW previously dealt with this issue and pt was advised by February 2023 check the garnishment would end. Pt will contact LSW when receives February check for outcome and if continues, will come to [place] office for phone calls to be made. Pt overwhelmed with difference of plans with [facility] and procedure requiring stopping Eliquis prior; There is Sept note for medical clearance of stopping Eliquis  2 days prior; pt states one of the [facility] providers suggested a different procedure which would not require stopping Eliquis due to pt's fear of having stroke; W suggested pt call [address] prior to his Dec 27 appt and schedule w that provider if possible to revisit other procedure. Pt also states he will have [employer] as of 1.1.23; he states he did check with [facility] they are providers; maxim montes he listed as PCP which he will call LSW tomorrow. Pt appreciative of time to process and direction as he easily becomes overwhelmed with multi level decisions.

## 2022-12-16 ENCOUNTER — TELEPHONE (OUTPATIENT)
Dept: INTERNAL MEDICINE | Age: 72
End: 2022-12-16

## 2022-12-16 NOTE — TELEPHONE ENCOUNTER
Message left for pt to call LSW as per his request to follow up e: name of PCP on his new [employer] insurance to ensure it is an IM attending physician

## 2023-03-02 ENCOUNTER — OFFICE VISIT (OUTPATIENT)
Dept: INTERNAL MEDICINE | Age: 73
End: 2023-03-02
Payer: MEDICARE

## 2023-03-02 VITALS
HEART RATE: 65 BPM | WEIGHT: 315 LBS | RESPIRATION RATE: 16 BRPM | TEMPERATURE: 98 F | OXYGEN SATURATION: 97 % | DIASTOLIC BLOOD PRESSURE: 67 MMHG | HEIGHT: 74 IN | SYSTOLIC BLOOD PRESSURE: 135 MMHG | BODY MASS INDEX: 40.43 KG/M2

## 2023-03-02 DIAGNOSIS — D68.9 COAGULOPATHY (HCC): Primary | ICD-10-CM

## 2023-03-02 DIAGNOSIS — E66.01 OBESITY, CLASS III, BMI 40-49.9 (MORBID OBESITY) (HCC): ICD-10-CM

## 2023-03-02 DIAGNOSIS — E66.9 DIABETES MELLITUS TYPE 2 IN OBESE (HCC): ICD-10-CM

## 2023-03-02 DIAGNOSIS — E11.69 DIABETES MELLITUS TYPE 2 IN OBESE (HCC): ICD-10-CM

## 2023-03-02 DIAGNOSIS — I48.20 CHRONIC ATRIAL FIBRILLATION (HCC): ICD-10-CM

## 2023-03-02 PROCEDURE — 3017F COLORECTAL CA SCREEN DOC REV: CPT | Performed by: INTERNAL MEDICINE

## 2023-03-02 PROCEDURE — 99213 OFFICE O/P EST LOW 20 MIN: CPT | Performed by: INTERNAL MEDICINE

## 2023-03-02 PROCEDURE — G8484 FLU IMMUNIZE NO ADMIN: HCPCS | Performed by: INTERNAL MEDICINE

## 2023-03-02 PROCEDURE — 3074F SYST BP LT 130 MM HG: CPT | Performed by: INTERNAL MEDICINE

## 2023-03-02 PROCEDURE — 3078F DIAST BP <80 MM HG: CPT | Performed by: INTERNAL MEDICINE

## 2023-03-02 PROCEDURE — 4004F PT TOBACCO SCREEN RCVD TLK: CPT | Performed by: INTERNAL MEDICINE

## 2023-03-02 PROCEDURE — 3044F HG A1C LEVEL LT 7.0%: CPT | Performed by: INTERNAL MEDICINE

## 2023-03-02 PROCEDURE — G8427 DOCREV CUR MEDS BY ELIG CLIN: HCPCS | Performed by: INTERNAL MEDICINE

## 2023-03-02 PROCEDURE — G8417 CALC BMI ABV UP PARAM F/U: HCPCS | Performed by: INTERNAL MEDICINE

## 2023-03-02 PROCEDURE — 99212 OFFICE O/P EST SF 10 MIN: CPT | Performed by: INTERNAL MEDICINE

## 2023-03-02 PROCEDURE — 2022F DILAT RTA XM EVC RTNOPTHY: CPT | Performed by: INTERNAL MEDICINE

## 2023-03-02 PROCEDURE — 1123F ACP DISCUSS/DSCN MKR DOCD: CPT | Performed by: INTERNAL MEDICINE

## 2023-03-02 ASSESSMENT — ENCOUNTER SYMPTOMS
CHEST TIGHTNESS: 0
CONSTIPATION: 0
RHINORRHEA: 0
BACK PAIN: 0
SHORTNESS OF BREATH: 0
WHEEZING: 0
VOMITING: 0
COUGH: 0
STRIDOR: 0
SINUS PAIN: 0
DIARRHEA: 0
SINUS PRESSURE: 0
NAUSEA: 0
ABDOMINAL PAIN: 0
SORE THROAT: 0

## 2023-03-02 NOTE — PROGRESS NOTES
[facility] Internal Medicine      SUBJECTIVE:  Boom Lindo (:  1950) is a 81 y.o. male here for evaluation of the following chief complaint(s): Other (Could not have his spinal injection due to abnormal bleeding reported to them by patient)    PMHx of paroxysmal atrial fibrillation, hypertension, diabetes mellitus, left frontal lobe CVA, PFO status post closure 19, obesity, LATISHA and BPH, lumbar radiculopathy. Patient is here in in the clinic for pre op evaluation. Last visit was instructed to hold eliquis three days before lumber RFA. While checking his finger stick blood sugar he saw small oozing of blood when he squeezed around the stickin site thus lumber pian intervention was held and refer back to IM clinic. He denies hematuria, black stool or skin changes. Review of Systems   Constitutional:  Negative for chills, diaphoresis, fatigue and fever. HENT:  Negative for rhinorrhea, sinus pressure, sinus pain and sore throat. Respiratory:  Negative for cough, chest tightness, shortness of breath, wheezing and stridor. Cardiovascular:  Negative for chest pain, palpitations and leg swelling. Gastrointestinal:  Negative for abdominal pain, constipation, diarrhea, nausea and vomiting. Endocrine: Negative for polydipsia and polyuria. Genitourinary:  Negative for dysuria and frequency. Musculoskeletal:  Negative for arthralgias, back pain and myalgias. Neurological:  Negative for dizziness, tremors, syncope, weakness and headaches. Psychiatric/Behavioral:  Negative for agitation. The patient is not nervous/anxious.       Current Outpatient Medications on File Prior to Visit   Medication Sig Dispense Refill    OXYCONTIN 40 MG extended release tablet       latanoprost (XALATAN) 0.005 % ophthalmic solution       dilTIAZem (CARDIZEM CD) 120 MG extended release capsule Take 1 capsule by mouth daily 90 capsule 1    atorvastatin (LIPITOR) 40 MG tablet Take 1 tablet by mouth nightly 90 tablet 1    vitamin B-1 (THIAMINE) 100 MG tablet Take 1 tablet by mouth daily 90 tablet 1    tamsulosin (FLOMAX) 0.4 MG capsule Take 1 capsule by mouth daily 90 capsule 1    Omega-3 Fatty Acids (FISH OIL) 1000 MG capsule Take by mouth daily otc      Flaxseed, Linseed, (FLAXSEED OIL) 1000 MG CAPS Take by mouth otc      metFORMIN (GLUCOPHAGE) 1000 MG tablet Take 1 tablet by mouth 2 times daily (with meals) 180 tablet 1    apixaban (ELIQUIS) 5 MG TABS tablet Take 1 tablet by mouth 2 times daily Take 1 tablet by mouth 2 times daily 180 tablet 1    vitamin D3 (CHOLECALCIFEROL) 25 MCG (1000 UT) TABS tablet Take 1 tablet by mouth daily 90 tablet 1    nicotine polacrilex (NICORETTE) 2 MG gum Take 1 each by mouth as needed for Smoking cessation Maximum dose: 24 pieces/24 hours. Stop chewing and park the piece of Nicorette between your cheek/ gums. After about a minute, when the tingling is almost gone, start chewing again. Repeat this process until the tingle is gone (about 30 minutes). 110 each 3    meclizine (ANTIVERT) 25 MG tablet Take 1 tablet by mouth 2 times daily as needed for Dizziness 60 tablet 0    blood glucose monitor strips 2 times daily. 100 strip 1    blood glucose monitor kit and supplies Test 2 times a day before breakfast and supper 1 kit 0    oxyCODONE (ROXICODONE) 5 MG immediate release tablet Take 5 mg by mouth every 6 hours as needed.  Ordered through [facility]      naloxone 4 MG/0.1ML LIQD nasal spray as directed (Patient not taking: No sig reported)      dicyclomine (BENTYL) 10 MG capsule Take 1 capsule by mouth every 6 hours as needed (Bowel spasms) 20 capsule 0     Current Facility-Administered Medications on File Prior to Visit   Medication Dose Route Frequency Provider Last Rate Last Admin    promethazine (PHENERGAN) tablet 12.5 mg  12.5 mg Oral Q6H PRN Noemi Quan MD        Or    ondansetron [facility]) injection 4 mg  4 mg IntraVENous Q6H PRN Debora Gamble Christa Cabrera MD        atorvastatin (LIPITOR) tablet 80 mg  80 mg Oral Nightly Yessenia Velazquez MD        sodium chloride flush 0.9 % injection 10 mL  10 mL IntraVENous 2 times per day Yessenia Velazquez MD        sodium chloride flush 0.9 % injection 10 mL  10 mL IntraVENous PRN Yessenia Velazquez MD        acetaminophen (TYLENOL) tablet 650 mg  650 mg Oral Q4H PRN Yessenia Velazquez MD           OBJECTIVE:    VS:   Vitals:    03/02/23 1521   BP: 135/67   Site: Left Upper Arm   Position: Sitting   Cuff Size: Medium Adult   Pulse: 65   Resp: 16   Temp: 98 °F (36.7 °C)   TempSrc: Temporal   SpO2: 97%   Weight: (!) 335 lb 3.2 oz (152 kg)   Height: 6' 2\" (1.88 m)     Physical Exam  Vitals and nursing note reviewed. Constitutional:       General: He is not in acute distress. Appearance: Normal appearance. He is well-developed. He is obese. He is not ill-appearing. HENT:      Head: Normocephalic and atraumatic. Right Ear: External ear normal.      Left Ear: External ear normal.   Eyes:      General: No scleral icterus. Conjunctiva/sclera: Conjunctivae normal.      Pupils: Pupils are equal, round, and reactive to light. Neck:      Vascular: No carotid bruit. Cardiovascular:      Rate and Rhythm: Normal rate and regular rhythm. Pulses: Normal pulses. Posterior tibial pulses are 2+ on the right side and 2+ on the left side. Heart sounds: Normal heart sounds, S1 normal and S2 normal. No murmur heard. Pulmonary:      Effort: Pulmonary effort is normal. No respiratory distress. Breath sounds: No decreased breath sounds, wheezing, rhonchi or rales. Abdominal:      General: Bowel sounds are normal. There is no distension. Palpations: Abdomen is soft. There is no mass. Tenderness: There is no abdominal tenderness. There is no guarding or rebound. Skin:     General: Skin is warm and dry. Capillary Refill: Capillary refill takes less than 2 seconds. Coloration: Skin is not jaundiced or pale. Neurological:      Mental Status: He is alert. ASSESSMENT/PLAN:    Coagulopathy? ?  Preop evaluation   Obesity, Class III, BMI 40-49.9   Diabetes mellitus type 2  Chronic atrial fibrillation     Plan:   Follow INR, CBC and CMP  Pt to hold eliquis 3 days before the intervention and resume 48 hours after the procedure   To follow with his PCP next month     RTC:  Return in about 7 weeks (around 4/17/2023) for PCP follow up. I have reviewed my findings and recommendations with Linda Morel and Dr. Em Khalil.     Sherri Man MD   3/2/2023 8:07 PM

## 2023-03-02 NOTE — PATIENT INSTRUCTIONS
Dear Pete Rm,    Thank you for coming to your appointment today. I hope we have addressed all of your needs. Please make sure to do the following:  - Continue your medications as listed. - Get labs drawn before our next follow up. - Eat healthy food and do physical workout as tolerated   - We will see each other again on 04/17/2023  - Follow up with your pain doctor     Call for a sooner appointment if needed     Have a great day!     Sincerely,  Bryan Goodwin M.D  3/2/2023  3:29 PM

## 2023-03-02 NOTE — PROGRESS NOTES
[address]  Internal Medicine Residency Clinic    Attending Physician Statement  I have discussed the case, including pertinent history and exam findings with the resident physician. I agree with the assessment, plan and orders as documented by the resident. I have reviewed all pertinent PMHx, PSHx, FamHx, SocialHx, medications, and allergies and updated history as appropriate. Patient presents for routine follow up of medical problems. He has PMH of BPH, AF, HLD, HTN, DM 2 and lumbar spinal stenosis. Reports his lumbar RFA with pain management cancelled due to bleeding from his finger. He denies any symptoms today, feels well and without acute complaints. Denies any further episodes of bleeding. Continue current medications as chronic conditions well controlled. Counseled lifestyle mods for obesity. Ok for [facility] with pain if holds [address] 3 days prior to procedure. Labs to be completed for routine monitoring. Remainder of medical problems as per resident note.     Mandy Nesbitt DO  3/2/2023 3:52 PM

## 2023-03-06 ENCOUNTER — HOSPITAL ENCOUNTER (OUTPATIENT)
Age: 73
Discharge: HOME | End: 2023-03-06
Payer: MEDICARE

## 2023-03-06 DIAGNOSIS — R35.1 BENIGN PROSTATIC HYPERPLASIA WITH NOCTURIA: ICD-10-CM

## 2023-03-06 DIAGNOSIS — D68.9 COAGULOPATHY (HCC): ICD-10-CM

## 2023-03-06 DIAGNOSIS — E66.9 DIABETES MELLITUS TYPE 2 IN OBESE (HCC): ICD-10-CM

## 2023-03-06 DIAGNOSIS — E11.69 DIABETES MELLITUS TYPE 2 IN OBESE (HCC): ICD-10-CM

## 2023-03-06 DIAGNOSIS — I10 ESSENTIAL HYPERTENSION: ICD-10-CM

## 2023-03-06 DIAGNOSIS — Z12.5 ENCOUNTER FOR SCREENING FOR MALIGNANT NEOPLASM OF PROSTATE: ICD-10-CM

## 2023-03-06 DIAGNOSIS — E55.9 VITAMIN D DEFICIENCY: ICD-10-CM

## 2023-03-06 DIAGNOSIS — N40.1 BENIGN PROSTATIC HYPERPLASIA WITH NOCTURIA: ICD-10-CM

## 2023-03-06 LAB
ANION GAP SERPL CALCULATED.3IONS-SCNC: 12 MMOL/L (ref 7–16)
BASOPHILS ABSOLUTE: 0.03 E9/L (ref 0–0.2)
BASOPHILS RELATIVE PERCENT: 0.6 % (ref 0–2)
BUN BLDV-MCNC: 12 MG/DL (ref 6–23)
CALCIUM SERPL-MCNC: 8.9 MG/DL (ref 8.6–10.2)
CHLORIDE BLD-SCNC: 105 MMOL/L (ref 98–107)
CO2: 22 MMOL/L (ref 22–29)
CREAT SERPL-MCNC: 1 MG/DL (ref 0.7–1.2)
CREATININE URINE: 239 MG/DL (ref 40–278)
EOSINOPHILS ABSOLUTE: 0.1 E9/L (ref 0.05–0.5)
EOSINOPHILS RELATIVE PERCENT: 1.9 % (ref 0–6)
GFR SERPL CREATININE-BSD FRML MDRD: >60 ML/MIN/1.73
GLUCOSE BLD-MCNC: 213 MG/DL (ref 74–99)
HBA1C MFR BLD: 6.3 % (ref 4–5.6)
HCT VFR BLD CALC: 38 % (ref 37–54)
HEMOGLOBIN: 12.2 G/DL (ref 12.5–16.5)
IMMATURE GRANULOCYTES #: 0.01 E9/L
IMMATURE GRANULOCYTES %: 0.2 % (ref 0–5)
INR BLD: 1.5
LYMPHOCYTES ABSOLUTE: 2.07 E9/L (ref 1.5–4)
LYMPHOCYTES RELATIVE PERCENT: 38.8 % (ref 20–42)
MCH RBC QN AUTO: 28.6 PG (ref 26–35)
MCHC RBC AUTO-ENTMCNC: 32.1 % (ref 32–34.5)
MCV RBC AUTO: 89.2 FL (ref 80–99.9)
MICROALBUMIN UR-MCNC: 32.9 MG/L
MICROALBUMIN/CREAT UR-RTO: 13.8 (ref 0–30)
MONOCYTES ABSOLUTE: 0.56 E9/L (ref 0.1–0.95)
MONOCYTES RELATIVE PERCENT: 10.5 % (ref 2–12)
NEUTROPHILS ABSOLUTE: 2.56 E9/L (ref 1.8–7.3)
NEUTROPHILS RELATIVE PERCENT: 48 % (ref 43–80)
PDW BLD-RTO: 12.8 FL (ref 11.5–15)
PLATELET # BLD: 302 E9/L (ref 130–450)
PMV BLD AUTO: 9.4 FL (ref 7–12)
POTASSIUM SERPL-SCNC: 4.6 MMOL/L (ref 3.5–5)
PROSTATE SPECIFIC ANTIGEN: 0.28 NG/ML (ref 0–4)
PROTHROMBIN TIME: 16.1 SEC (ref 9.3–12.4)
RBC # BLD: 4.26 E12/L (ref 3.8–5.8)
SODIUM BLD-SCNC: 139 MMOL/L (ref 132–146)
VITAMIN D 25-HYDROXY: 10 NG/ML (ref 30–100)
WBC # BLD: 5.3 E9/L (ref 4.5–11.5)

## 2023-03-06 PROCEDURE — 36415 COLL VENOUS BLD VENIPUNCTURE: CPT

## 2023-03-06 PROCEDURE — 85610 PROTHROMBIN TIME: CPT

## 2023-03-06 PROCEDURE — G0103 PSA SCREENING: HCPCS

## 2023-03-06 PROCEDURE — 80048 BASIC METABOLIC PNL TOTAL CA: CPT

## 2023-03-06 PROCEDURE — 82570 ASSAY OF URINE CREATININE: CPT

## 2023-03-06 PROCEDURE — 82306 VITAMIN D 25 HYDROXY: CPT

## 2023-03-06 PROCEDURE — 85025 COMPLETE CBC W/AUTO DIFF WBC: CPT

## 2023-03-06 PROCEDURE — 83036 HEMOGLOBIN GLYCOSYLATED A1C: CPT

## 2023-03-06 PROCEDURE — 82044 UR ALBUMIN SEMIQUANTITATIVE: CPT

## 2023-04-17 ENCOUNTER — OFFICE VISIT (OUTPATIENT)
Dept: INTERNAL MEDICINE | Age: 73
End: 2023-04-17
Payer: MEDICARE

## 2023-04-17 VITALS
HEART RATE: 60 BPM | RESPIRATION RATE: 18 BRPM | TEMPERATURE: 96.8 F | SYSTOLIC BLOOD PRESSURE: 128 MMHG | BODY MASS INDEX: 40.43 KG/M2 | DIASTOLIC BLOOD PRESSURE: 65 MMHG | OXYGEN SATURATION: 96 % | HEIGHT: 74 IN | WEIGHT: 315 LBS

## 2023-04-17 DIAGNOSIS — E11.69 DIABETES MELLITUS TYPE 2 IN OBESE (HCC): ICD-10-CM

## 2023-04-17 DIAGNOSIS — Z13.6 ENCOUNTER FOR ABDOMINAL AORTIC ANEURYSM (AAA) SCREENING: ICD-10-CM

## 2023-04-17 DIAGNOSIS — K59.03 DRUG-INDUCED CONSTIPATION: ICD-10-CM

## 2023-04-17 DIAGNOSIS — R05.2 SUBACUTE COUGH: Primary | ICD-10-CM

## 2023-04-17 DIAGNOSIS — E66.9 DIABETES MELLITUS TYPE 2 IN OBESE (HCC): ICD-10-CM

## 2023-04-17 DIAGNOSIS — Z23 NEED FOR SHINGLES VACCINE: ICD-10-CM

## 2023-04-17 PROCEDURE — 99213 OFFICE O/P EST LOW 20 MIN: CPT | Performed by: INTERNAL MEDICINE

## 2023-04-17 RX ORDER — SENNA AND DOCUSATE SODIUM 50; 8.6 MG/1; MG/1
1 TABLET, FILM COATED ORAL DAILY
Qty: 90 TABLET | Refills: 0 | Status: SHIPPED | OUTPATIENT
Start: 2023-04-17

## 2023-04-17 RX ORDER — ZOSTER VACCINE RECOMBINANT, ADJUVANTED 50 MCG/0.5
0.5 KIT INTRAMUSCULAR SEE ADMIN INSTRUCTIONS
Qty: 0.5 ML | Refills: 0 | Status: SHIPPED | OUTPATIENT
Start: 2023-04-17 | End: 2023-10-14

## 2023-04-17 RX ORDER — LORATADINE 10 MG/1
10 TABLET ORAL DAILY
Qty: 90 TABLET | Refills: 0 | Status: SHIPPED | OUTPATIENT
Start: 2023-04-17 | End: 2023-05-17

## 2023-04-17 RX ORDER — GLUCOSAMINE HCL/CHONDROITIN SU 500-400 MG
CAPSULE ORAL
Qty: 100 STRIP | Refills: 1 | Status: SHIPPED | OUTPATIENT
Start: 2023-04-17

## 2023-04-17 ASSESSMENT — ENCOUNTER SYMPTOMS
SHORTNESS OF BREATH: 0
COUGH: 1
CONSTIPATION: 1
CHEST TIGHTNESS: 0
NAUSEA: 0
DIARRHEA: 0
ABDOMINAL PAIN: 0

## 2023-04-17 NOTE — PROGRESS NOTES
[address]  Internal Medicine Residency Clinic    Attending Physician Statement  I have discussed the case, including pertinent history and exam findings with the resident physician. I agree with the assessment, plan and orders as documented by the resident. I have reviewed all pertinent PMHx, PSHx, FamHx, SocialHx, medications, and allergies and updated history as appropriate. Patient here for routine follow up of medical problems. Lumbar radiculopathy - follows with pain management. Was doing well with oxycontin, but this was changed with a change in insurance formulary. This is not working as well. Also has a dry cough. He is working with pain management on this. Received a steroid injection in the spine which has helped. Continue treatment as per pain management. Constipation - secondary to pain medications   Agree with the addition of senna. LATISHA - has stopped using his CPAP. Encourage adherence to this as could be contributing to cough. + cough - ok to treat with an antihistamine for PND. Would also check a CXR given the patient's smoking history.  on smoking cessation. CXR. HTN - controlled   Continue diltiazem. A-fib - on cardizem and eliquis. Controlled   Continue current medications. DM - HbA1c - 6.3. On Metformin, 1000 mg BID. Consider decrease of metformin to 500 mg twice daily if still controlled at next visit. Health maintenance - patient agreeable to Shingrix vaccine. Remainder of medical problems as per resident note.     Kristen Barahona MD  4/17/2023 9:06 AM
time.   Psychiatric:         Mood and Affect: Mood normal.         Behavior: Behavior normal.         Thought Content: Thought content normal.         Judgment: Judgment normal.          ASSESSMENT/PLAN:    Lumbar radiculopathy on chronic opioids now with constipation  Patient reports that he had to change insurance as recently and was on OxyContin chronically for his lumbar radiculopathy. Since insurance had to change the brand of his medication had to change to oxycodone which has been causing him side effects such as dry cough/constipation. Patient is frustrated and wishes he can go back to OxyContin but advised him that this has to be done through his pain clinic. Start Senokot as needed for constipation    Hypertension, controlled  Continue Cardizem 120 mg extended release daily     History of A. fib  Continue Eliquis and Cardizem as above     NIDDM 2  Continue metformin 500 mg twice daily     Mild LATISHA, noncompliant now with dry cough  Advised compliance with CPAP  Follow chest x-ray  Start Claritin daily for cough     History of frontal ischemic stroke on 6/24/2018  On Lipitor 20 mg daily and Eliquis as above     Vitamin d deficiency  Continue vitamin D supplementation     History of BPH  Continue Flomax 0.4 mg daily    Healthcare maintenance  Shingles vaccine prescription and AAA screen ordered today  Next colonoscopy due in 2025    RTC:  Return in about 3 months (around 7/17/2023) for Routine follow up with PCP. I have reviewed my findings and recommendations with Prabhu Dill and Dr. Leyda Traylor.     Deric Galeano MD   4/17/2023 10:54 AM

## 2023-04-17 NOTE — PATIENT INSTRUCTIONS
Start claritin daily as needed for dry cough. Please have chest xray done as well.   Start Sennokot once to twice daily as needed for constipation  Please have lipid panel and AAA ultrasound done prior to next follow up

## 2023-04-19 ENCOUNTER — HOSPITAL ENCOUNTER (OUTPATIENT)
Age: 73
Discharge: HOME | End: 2023-04-19
Payer: MEDICARE

## 2023-04-19 DIAGNOSIS — E66.9 DIABETES MELLITUS TYPE 2 IN OBESE (HCC): ICD-10-CM

## 2023-04-19 DIAGNOSIS — E11.69 DIABETES MELLITUS TYPE 2 IN OBESE (HCC): ICD-10-CM

## 2023-04-19 LAB
CHOLESTEROL, FASTING: 120 MG/DL (ref 0–199)
HDLC SERPL-MCNC: 55 MG/DL
LDL CHOLESTEROL CALCULATED: 46 MG/DL (ref 0–99)
TRIGLYCERIDE, FASTING: 94 MG/DL (ref 0–149)
VLDLC SERPL CALC-MCNC: 19 MG/DL

## 2023-04-19 PROCEDURE — 80061 LIPID PANEL: CPT

## 2023-04-19 PROCEDURE — 36415 COLL VENOUS BLD VENIPUNCTURE: CPT

## 2023-05-09 ENCOUNTER — TELEPHONE (OUTPATIENT)
Dept: INTERNAL MEDICINE | Age: 73
End: 2023-05-09

## 2023-05-09 NOTE — TELEPHONE ENCOUNTER
----- Message from Angelica Oakley sent at 5/9/2023  9:18 AM EDT -----  Subject: Message to Provider    QUESTIONS  Information for Provider? Pt called and stated that he would like for   someone in the office to give him a call back regarding what medications   he is suppose to be taking, and which ones he is not suppose to be taking. Pt also states that he had a stroke and needs after care for after his   stroke. Pt states that he needs an evaluation because he is not sleeping   at night, he's having pain, and seeing things at night. Please call the pt   back, pt would like to know what he should do.   ---------------------------------------------------------------------------  --------------  [facility]  [phone]; Do not leave any message, patient will call back for answer  ---------------------------------------------------------------------------  --------------  SCRIPT ANSWERS  Relationship to Patient?  Self

## 2023-05-09 NOTE — TELEPHONE ENCOUNTER
ZOE made contact to pt after pt contacted [facility] [address] reporting some barriers with healthcare coverage. Pt reports he has been being seen at a Pain Clinic and his Medicare will no longer cover his medication. Pt reports this has caused increased stress and pain. Pt during call displayed pressured speech and also reported a hx of strokes. Pt was able to express current challenges he is having and was help seeking. Pt used to treat at [facility] for behavioral health. Susannah Saroberto carlos reports he believes he was prescribed Abilify. Pt reports being treated for a mood disorder unsure of exact diagnosis. Pt denies SI and denies HI. Pt reports limited sleep and reports a concern for visual hallucinations at night due to all the stress. In the past pt reports a bird will come start talking to him and bothering him. Pt denies this is occurring now but is fearful this will start happening.  ZOE discussed safety planning and discussed with pt if this begins happening to present to emergency department    Pt asked to be referred to a psychiatrist for medication management     ZOE faxed referral to [address] to continue to follow pt to ensure pt was linked to services    Pt to be seen in clinic tomorrow by PCP

## 2023-05-09 NOTE — TELEPHONE ENCOUNTER
LISW followed up with pt, pt is scheduled to be seen Thursday by psych NP at [facility]. Pt reports he is feeling much better that this has been scheduled. ARTUROW to continue to follow pt.

## 2023-05-10 ENCOUNTER — OFFICE VISIT (OUTPATIENT)
Dept: INTERNAL MEDICINE | Age: 73
End: 2023-05-10
Payer: MEDICARE

## 2023-05-10 ENCOUNTER — SOCIAL WORK (OUTPATIENT)
Dept: INTERNAL MEDICINE | Age: 73
End: 2023-05-10

## 2023-05-10 ENCOUNTER — TELEPHONE (OUTPATIENT)
Dept: INTERNAL MEDICINE | Age: 73
End: 2023-05-10

## 2023-05-10 VITALS
HEART RATE: 88 BPM | BODY MASS INDEX: 40.43 KG/M2 | HEIGHT: 74 IN | DIASTOLIC BLOOD PRESSURE: 70 MMHG | TEMPERATURE: 97.6 F | WEIGHT: 315 LBS | SYSTOLIC BLOOD PRESSURE: 138 MMHG | OXYGEN SATURATION: 98 % | RESPIRATION RATE: 20 BRPM

## 2023-05-10 DIAGNOSIS — R33.8 BENIGN PROSTATIC HYPERPLASIA WITH URINARY RETENTION: ICD-10-CM

## 2023-05-10 DIAGNOSIS — E11.69 DIABETES MELLITUS TYPE 2 IN OBESE (HCC): Primary | ICD-10-CM

## 2023-05-10 DIAGNOSIS — I48.20 CHRONIC ATRIAL FIBRILLATION (HCC): ICD-10-CM

## 2023-05-10 DIAGNOSIS — N40.1 BENIGN PROSTATIC HYPERPLASIA WITH NOCTURIA: ICD-10-CM

## 2023-05-10 DIAGNOSIS — K59.03 DRUG-INDUCED CONSTIPATION: ICD-10-CM

## 2023-05-10 DIAGNOSIS — R35.1 BENIGN PROSTATIC HYPERPLASIA WITH NOCTURIA: ICD-10-CM

## 2023-05-10 DIAGNOSIS — F39 MOOD DISORDER (HCC): ICD-10-CM

## 2023-05-10 DIAGNOSIS — I10 ESSENTIAL HYPERTENSION: ICD-10-CM

## 2023-05-10 DIAGNOSIS — E78.5 HYPERLIPIDEMIA, UNSPECIFIED HYPERLIPIDEMIA TYPE: ICD-10-CM

## 2023-05-10 DIAGNOSIS — N40.1 BENIGN PROSTATIC HYPERPLASIA WITH URINARY RETENTION: ICD-10-CM

## 2023-05-10 DIAGNOSIS — E66.9 DIABETES MELLITUS TYPE 2 IN OBESE (HCC): Primary | ICD-10-CM

## 2023-05-10 DIAGNOSIS — Z72.0 TOBACCO ABUSE: ICD-10-CM

## 2023-05-10 DIAGNOSIS — M54.16 LUMBAR RADICULOPATHY: ICD-10-CM

## 2023-05-10 DIAGNOSIS — E55.9 VITAMIN D DEFICIENCY: ICD-10-CM

## 2023-05-10 DIAGNOSIS — E66.01 OBESITY, CLASS III, BMI 40-49.9 (MORBID OBESITY) (HCC): ICD-10-CM

## 2023-05-10 PROCEDURE — 99214 OFFICE O/P EST MOD 30 MIN: CPT | Performed by: INTERNAL MEDICINE

## 2023-05-10 PROCEDURE — 99212 OFFICE O/P EST SF 10 MIN: CPT | Performed by: INTERNAL MEDICINE

## 2023-05-10 PROCEDURE — 2022F DILAT RTA XM EVC RTNOPTHY: CPT | Performed by: INTERNAL MEDICINE

## 2023-05-10 PROCEDURE — 4004F PT TOBACCO SCREEN RCVD TLK: CPT | Performed by: INTERNAL MEDICINE

## 2023-05-10 PROCEDURE — 3044F HG A1C LEVEL LT 7.0%: CPT | Performed by: INTERNAL MEDICINE

## 2023-05-10 PROCEDURE — 3075F SYST BP GE 130 - 139MM HG: CPT | Performed by: INTERNAL MEDICINE

## 2023-05-10 PROCEDURE — 3017F COLORECTAL CA SCREEN DOC REV: CPT | Performed by: INTERNAL MEDICINE

## 2023-05-10 PROCEDURE — 3078F DIAST BP <80 MM HG: CPT | Performed by: INTERNAL MEDICINE

## 2023-05-10 PROCEDURE — 1123F ACP DISCUSS/DSCN MKR DOCD: CPT | Performed by: INTERNAL MEDICINE

## 2023-05-10 PROCEDURE — G8427 DOCREV CUR MEDS BY ELIG CLIN: HCPCS | Performed by: INTERNAL MEDICINE

## 2023-05-10 PROCEDURE — G8417 CALC BMI ABV UP PARAM F/U: HCPCS | Performed by: INTERNAL MEDICINE

## 2023-05-10 RX ORDER — ERGOCALCIFEROL 1.25 MG/1
50000 CAPSULE ORAL WEEKLY
Qty: 12 CAPSULE | Refills: 1 | Status: CANCELLED | OUTPATIENT
Start: 2023-05-10

## 2023-05-10 RX ORDER — OXYCODONE 36 MG/1
CAPSULE, EXTENDED RELEASE ORAL
COMMUNITY
Start: 2023-04-06 | End: 2023-05-11

## 2023-05-10 RX ORDER — DILTIAZEM HYDROCHLORIDE 120 MG/1
120 CAPSULE, COATED, EXTENDED RELEASE ORAL DAILY
Qty: 90 CAPSULE | Refills: 1 | Status: SHIPPED | OUTPATIENT
Start: 2023-05-10

## 2023-05-10 RX ORDER — TAMSULOSIN HYDROCHLORIDE 0.4 MG/1
0.4 CAPSULE ORAL DAILY
Qty: 90 CAPSULE | Refills: 1 | Status: SHIPPED | OUTPATIENT
Start: 2023-05-10

## 2023-05-10 RX ORDER — ATORVASTATIN CALCIUM 40 MG/1
40 TABLET, FILM COATED ORAL NIGHTLY
Qty: 90 TABLET | Refills: 1 | Status: SHIPPED | OUTPATIENT
Start: 2023-05-10

## 2023-05-10 ASSESSMENT — PATIENT HEALTH QUESTIONNAIRE - PHQ9
SUM OF ALL RESPONSES TO PHQ QUESTIONS 1-9: 1
1. LITTLE INTEREST OR PLEASURE IN DOING THINGS: 0
SUM OF ALL RESPONSES TO PHQ QUESTIONS 1-9: 1
SUM OF ALL RESPONSES TO PHQ QUESTIONS 1-9: 1
SUM OF ALL RESPONSES TO PHQ9 QUESTIONS 1 & 2: 1
2. FEELING DOWN, DEPRESSED OR HOPELESS: 1
SUM OF ALL RESPONSES TO PHQ QUESTIONS 1-9: 1

## 2023-05-10 NOTE — PROGRESS NOTES
[address]  Internal Medicine Residency Clinic  Attending Physician Statement:  Cira Metcalf M.D., F.A.C.P. Patient is seen for fu visit today. -- acute and chronic problems addressed  Addressed when applicable- Health maintenance issues of vaccinations, social determinants/depression/CA screening, tobacco cessation etc... I have discussed the case, including pertinent history and exam findings with the resident, review of last visit medical records/labs-   I agree with the assessment, plan and orders as documented by the resident.    -Lin Layton assessed by medical complexity of case  My assessment of high points of todays visit as follows: Long psych hx- paranoid schizophrenia  Pressured speech-- no obvious medical reason for provocation  (steroids, ssri etc.)    His visions (person who tells him to chronically hurt people- usually returns at night), no active plan to harm  -- wanted to harm supervisor in past, but that person is  for other reasons now  To see psych tomorrow- not taking Nolon Bruins? Meds- to see psych tomorrow  - defer pink slip bc no eminent threat     He claims pain not well controlled  MRI  showed multilevel neural foraminal stenoses worst at L5-S1  Seen Dr. Roman Min 10 yrs ago, recommended surgery   Lumbar nerve blocks (almost 27) & radiofrequency in  by Dr. Saman Del Angel with Dr. Sondra Fallon. 3/28/2023 steroid injection to back  Patient reports that he had to change insurance as recently and was on OxyContin chronically for his lumbar radiculopathy. Since insurance had to change the brand of his medication had to change to oxycodone which has been causing him side effects such as dry cough/constipation.   See pain management on polypharmacy opiods  Will defer changes, advised fu his established pain management doc  -- discussed inc sennokot- opiod induced constipation on a few agents  Bph- flomax stable  Dm2 on metformin  Ldl 46- lipitor 40QD  BP
error
1000 MG capsule Take by mouth daily otc      Flaxseed, Linseed, (FLAXSEED OIL) 1000 MG CAPS Take by mouth otc      vitamin D3 (CHOLECALCIFEROL) 25 MCG (1000 UT) TABS tablet Take 1 tablet by mouth daily 90 tablet 1    meclizine (ANTIVERT) 25 MG tablet Take 1 tablet by mouth 2 times daily as needed for Dizziness 60 tablet 0    blood glucose monitor kit and supplies Test 2 times a day before breakfast and supper 1 kit 0    oxyCODONE (ROXICODONE) 5 MG immediate release tablet Take 1 tablet by mouth every 6 hours as needed. [facility] through [facility]         OBJECTIVE:    VS:   /70 (Site: Left Upper Arm, Position: Sitting, Cuff Size: Large Adult)   Pulse 88   Temp 97.6 °F (36.4 °C) (Temporal)   Resp 20   Ht 6' 2\" (1.88 m)   Wt (!) 332 lb (150.6 kg)   SpO2 98% Comment: room air  BMI 42.63 kg/m²     EXAM:  /70 (Site: Left Upper Arm, Position: Sitting, Cuff Size: Large Adult)   Pulse 88   Temp 97.6 °F (36.4 °C) (Temporal)   Resp 20   Ht 6' 2\" (1.88 m)   Wt (!) 332 lb (150.6 kg)   SpO2 98% Comment: room air  BMI 42.63 kg/m²   Physical Exam  Constitutional:       Appearance: Normal appearance. He is not toxic-appearing. HENT:      Head: Normocephalic and atraumatic. Eyes:      Pupils: Pupils are equal, round, and reactive to light. Cardiovascular:      Rate and Rhythm: Normal rate and regular rhythm. Pulses: Normal pulses. Heart sounds: Normal heart sounds. Pulmonary:      Effort: Pulmonary effort is normal.      Breath sounds: Normal breath sounds. No wheezing or rhonchi. Abdominal:      General: Abdomen is flat. Bowel sounds are normal.      Palpations: Abdomen is soft. Tenderness: There is no abdominal tenderness. Musculoskeletal:      Cervical back: Neck supple. Skin:     General: Skin is warm and dry. Neurological:      General: No focal deficit present. Mental Status: He is alert and oriented to person, place, and time.    Psychiatric:    pressured

## 2023-05-10 NOTE — PATIENT INSTRUCTIONS
Dear Delvis Vick,    Please make sure to keep your appointment with counseling service tomorrow. I have refilled your medications. Please see us back in 2 months.

## 2023-05-11 ENCOUNTER — OFFICE VISIT (OUTPATIENT)
Age: 73
End: 2023-05-11

## 2023-05-11 DIAGNOSIS — F31.2 BIPOLAR AFFECTIVE DISORDER, CURRENTLY MANIC, SEVERE, WITH PSYCHOTIC FEATURES (HCC): Primary | ICD-10-CM

## 2023-05-11 RX ORDER — ARIPIPRAZOLE 10 MG/1
10 TABLET ORAL DAILY
Qty: 14 TABLET | Refills: 0 | Status: SHIPPED | OUTPATIENT
Start: 2023-05-11 | End: 2023-05-25

## 2023-05-11 NOTE — PROGRESS NOTES
PSYCHIATRIC INITIAL EVALUATION      CHIEF COMPLAINT:  \"I'm terrible. \"     HISTORY OF PRESENT ILLNESS: Sapna Mccullough is a 81 y.o. male who presents for psychiatric evaluation. Patient has a past psychiatric history of \"hallucinations. \" Patient states he got a morphine shot in the past after an athletic injury. The morphine caused him to experience hallucinations. He got hurt in 2005 other job and \"should have got an operation\" but workers compensation wanted to Jeanne an alternative. \" He went to a pain clinic and they gave him Toradol shots which caused hallucinations \"all over again. \" He then went to [facility] for hallucinations. \"No I am in a bad predicament. \" February 2022 got a \"shot of radiofrequency. \" Reported it was a terrible experience. Stopped taking Xtampza due to severe constipation. Feels humiliated because he had to pull \"shit out of his butt. \" Reports many recent changes in his pain management medications. He ran out of his medications for 8-10 days then started seeing \"Moby\" who was telling him that people were out to get him. It has been ten years since this has happened. States hallucinations have returned after not having enough pain medications. He wasn't sleeping. States he \"sees and smells Moby\", hears \"Moby's laugh. \" States Rob Carmona is a big, black crow getting bigger as the pain gets worse\" States \"Sammy took me and a young lady a flew us. \" He states he cannot sleep because he sees Moby. He is perseverating on his pain regimen. States Lary Cameron are setting me up for failure. I feel like they don't want me in the program and are making me act up to kick me out. \" He states \"I am not crazy, I am in pain. \"Im not going to let Mobannamaria take advantage of me. \" Patient has difficulty describing specific symptoms. Anxiety: Reports racing thoughts. Panic: \"I felt my heart turn over and couldn't catch my breath because of the pain. \"  Depression: Lays in recliner chair ad goes to bed, feels like crying,

## 2023-05-11 NOTE — PROGRESS NOTES
Met with pt during 5.10.23 IM appt prior to physician. Pt drove self to appt; had good appearance; had his walker folded up and states always bring with him as his gait changes often due to pain. Pt's speech was less pressured as previous phone encounter. He was calm for most part as re explaining concerns of change of pain medication regiment due to [employer]'s formulary changes. Pt endorses embarassment of return of visual hallucination of \"Moby\", a big bird. He is aware of 5.11.23 NP appt at [place] [id number] services for behavioral health medication evaluation and is looking forward to the opportunity. LSW offered  to attempt to obtain more information between [employer]. Requested for pt to sign CARLOS and resident came in to see pt and he requested for LSW to return. Upon return to room, pt was more agitated and had been standing due to increased pain episode. Pt denied self harm or harm to others/homicidal ideations, plan or intent. Easily expresses frustration with \"system issues\". Pt signed CARLOS and advised once information received will set appt to also contact [employer]    5.11.23  LSW left message at [facility] for return call.   Pt called LSW after [facility] appt as requested and advised no information obtained ; plan meeting next week w pt    5.12.23  [facility] [address] [facility] to fax CARLOS as well as clinical line to speak with staff

## 2023-05-19 ENCOUNTER — TELEPHONE (OUTPATIENT)
Dept: INTERNAL MEDICINE | Age: 73
End: 2023-05-19

## 2023-05-19 NOTE — TELEPHONE ENCOUNTER
Summary:  Records from [address] [facility] received on 5.15.23 and now scanned in chart. LSW received call from Mia Mark of [facility] on 5.17.23 and reviewed case; pt has upcoming appt on 5.24.23 which further discussion of regiment will be discussed ; there was no indication of the office potentially discharging from care as pt offered that as a concern to LSW. Advised LSW willing to navigate [employer] insurnace issues as needed if further medication is recommended    Pt returned call to [facility] on 5.19.23; states Abilify has helped with obtaining 4-5 hours of sleep. Has also contacted [facility] to return to ongoing behavioral health care there and has appt on 5.25.23.   Advised pt of above interaction and plans to return for 5.24.23 [facility] and will contact LSW with outcome
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
## 2023-05-25 ENCOUNTER — OFFICE VISIT (OUTPATIENT)
Age: 73
End: 2023-05-25

## 2023-05-25 DIAGNOSIS — F31.62 BIPOLAR 1 DISORDER, MIXED, MODERATE (HCC): Primary | ICD-10-CM

## 2023-05-25 RX ORDER — QUETIAPINE FUMARATE 25 MG/1
25 TABLET, FILM COATED ORAL 2 TIMES DAILY
Qty: 60 TABLET | Refills: 0 | Status: SHIPPED | OUTPATIENT
Start: 2023-05-25 | End: 2023-06-24

## 2023-05-25 RX ORDER — LURASIDONE HYDROCHLORIDE 20 MG/1
20 TABLET, FILM COATED ORAL DAILY
Qty: 30 TABLET | Refills: 0 | Status: SHIPPED
Start: 2023-05-25 | End: 2023-05-25

## 2023-06-01 ENCOUNTER — TELEPHONE (OUTPATIENT)
Dept: INTERNAL MEDICINE | Age: 73
End: 2023-06-01

## 2023-06-01 NOTE — TELEPHONE ENCOUNTER
Pt called back stating he \"just don't understand what he is doing over there , and he doesn't understand the office structure\" he states he went 5/24, and \"has not received any medications refills,\" he states he is very uncomfortable with pain and is thinking about going to ED because he's not sure what to do , he states he changed his insurance recently to see if that was the problem with his medication and was about to call over to [facility] to check . I told patient I will call [facility] to see if I can get some information.

## 2023-06-01 NOTE — TELEPHONE ENCOUNTER
Left vm at [facility] to speak to nurse to ask if patient had any recent refills on medication, and  to see if he can have a refill if not.

## 2023-06-01 NOTE — TELEPHONE ENCOUNTER
Last Appointment:  4/17/2023  Future Appointments   Date Time Provider Daniela Banerjee   6/8/2023 10:00 AM KVNG Jones - CNP [place] [facility]   7/17/2023  9:30 AM SE US RM 3 SEYZ US [facility]   8/10/2023  9:00 AM MHYX SE [facility] IM, RESIDENT D [facility]      Please advise, pt requesting referral to pain management.

## 2023-06-06 ENCOUNTER — OFFICE VISIT (OUTPATIENT)
Dept: PRIMARY CARE CLINIC | Age: 73
End: 2023-06-06
Payer: MEDICARE

## 2023-06-06 VITALS
DIASTOLIC BLOOD PRESSURE: 66 MMHG | HEIGHT: 74 IN | WEIGHT: 315 LBS | OXYGEN SATURATION: 96 % | TEMPERATURE: 97.9 F | BODY MASS INDEX: 40.43 KG/M2 | RESPIRATION RATE: 18 BRPM | HEART RATE: 65 BPM | SYSTOLIC BLOOD PRESSURE: 142 MMHG

## 2023-06-06 DIAGNOSIS — G89.29 CHRONIC BILATERAL LOW BACK PAIN WITHOUT SCIATICA: Primary | ICD-10-CM

## 2023-06-06 DIAGNOSIS — M54.50 CHRONIC BILATERAL LOW BACK PAIN WITHOUT SCIATICA: Primary | ICD-10-CM

## 2023-06-06 PROCEDURE — 99213 OFFICE O/P EST LOW 20 MIN: CPT | Performed by: NURSE PRACTITIONER

## 2023-06-06 PROCEDURE — 3017F COLORECTAL CA SCREEN DOC REV: CPT | Performed by: NURSE PRACTITIONER

## 2023-06-06 PROCEDURE — G8427 DOCREV CUR MEDS BY ELIG CLIN: HCPCS | Performed by: NURSE PRACTITIONER

## 2023-06-06 PROCEDURE — G8417 CALC BMI ABV UP PARAM F/U: HCPCS | Performed by: NURSE PRACTITIONER

## 2023-06-06 PROCEDURE — 4004F PT TOBACCO SCREEN RCVD TLK: CPT | Performed by: NURSE PRACTITIONER

## 2023-06-06 PROCEDURE — 3078F DIAST BP <80 MM HG: CPT | Performed by: NURSE PRACTITIONER

## 2023-06-06 PROCEDURE — 1123F ACP DISCUSS/DSCN MKR DOCD: CPT | Performed by: NURSE PRACTITIONER

## 2023-06-06 PROCEDURE — 3074F SYST BP LT 130 MM HG: CPT | Performed by: NURSE PRACTITIONER

## 2023-06-06 RX ORDER — HYDROCODONE BITARTRATE AND ACETAMINOPHEN 7.5; 325 MG/1; MG/1
TABLET ORAL
COMMUNITY
Start: 2023-05-24

## 2023-06-07 NOTE — PROGRESS NOTES
Comm Ins  [address]    04/03/2023 03/27/2023   1  Oxycontin Er 40 Mg Tablet 20.00  10  [facility]  [id number]   Bro ([id number])  0  120.00 MME  Comm Ins  [address]    03/27/2023 03/27/2023   1  Oxycodone Hcl (Ir) 5 Mg Tablet 30.00  30  [facility]  [id number]   Bro ([id number])              VITALS: There were no vitals filed for this visit. LABS:   No visits with results within 2 Day(s) from this visit. Latest known visit with results is:   Hospital Outpatient Visit on 04/19/2023   Component Date Value Ref Range Status    Cholesterol, Fasting 04/19/2023 120  0 - 199 mg/dL Final    Triglyceride, Fasting 04/19/2023 94  0 - 149 mg/dL Final    HDL 04/19/2023 55  >40 mg/dL Final    LDL Calculated 04/19/2023 46  0 - 99 mg/dL Final    VLDL Cholesterol Calculated 04/19/2023 19  mg/dL Final           MEDICATIONS: Bipolar Disorder mixed with psychotic features     PTSD     Chronic back pain     ASSESSMENT:       Seroquel 25 mg at night (change from bid)    PLAN:    Patient states that his mental health is doing a lot better since he is sleeping more. He reports that his doctors are working on better pain management and is is happier about that. Patient is not open to anymore psychotropics at this time. He reports he does not always take Seroquel bid due to sedation at times but feels it is helping. Will change Seroquel to 25 mg at hs due to sedation and patient is open to this change. RTC one month.                     Electronically signed by KVNG Nguyen CNP on 6/7/2023 at 10:46 AM

## 2023-06-08 ENCOUNTER — OFFICE VISIT (OUTPATIENT)
Age: 73
End: 2023-06-08
Payer: MEDICARE

## 2023-06-08 DIAGNOSIS — F31.9 BIPOLAR I DISORDER WITH MIXED FEATURES (HCC): Primary | ICD-10-CM

## 2023-06-08 PROCEDURE — G8428 CUR MEDS NOT DOCUMENT: HCPCS

## 2023-06-08 PROCEDURE — 4004F PT TOBACCO SCREEN RCVD TLK: CPT

## 2023-06-08 PROCEDURE — G8417 CALC BMI ABV UP PARAM F/U: HCPCS

## 2023-06-08 PROCEDURE — 1123F ACP DISCUSS/DSCN MKR DOCD: CPT

## 2023-06-08 PROCEDURE — 3017F COLORECTAL CA SCREEN DOC REV: CPT

## 2023-06-08 PROCEDURE — 99214 OFFICE O/P EST MOD 30 MIN: CPT

## 2023-07-17 ENCOUNTER — HOSPITAL ENCOUNTER (OUTPATIENT)
Dept: ULTRASOUND IMAGING | Age: 73
Discharge: HOME | End: 2023-07-19
Payer: MEDICARE

## 2023-07-17 DIAGNOSIS — Z13.6 ENCOUNTER FOR ABDOMINAL AORTIC ANEURYSM (AAA) SCREENING: ICD-10-CM

## 2023-07-17 PROCEDURE — 76775 US EXAM ABDO BACK WALL LIM: CPT

## 2023-08-01 ENCOUNTER — OFFICE VISIT (OUTPATIENT)
Dept: INTERNAL MEDICINE | Age: 73
End: 2023-08-01
Payer: MEDICARE

## 2023-08-01 VITALS
HEIGHT: 74 IN | SYSTOLIC BLOOD PRESSURE: 142 MMHG | RESPIRATION RATE: 18 BRPM | DIASTOLIC BLOOD PRESSURE: 68 MMHG | TEMPERATURE: 97.6 F | HEART RATE: 69 BPM | WEIGHT: 315 LBS | OXYGEN SATURATION: 95 % | BODY MASS INDEX: 40.43 KG/M2

## 2023-08-01 DIAGNOSIS — E66.9 DIABETES MELLITUS TYPE 2 IN OBESE (HCC): Primary | Chronic | ICD-10-CM

## 2023-08-01 DIAGNOSIS — I48.20 CHRONIC ATRIAL FIBRILLATION (HCC): ICD-10-CM

## 2023-08-01 DIAGNOSIS — E11.69 DIABETES MELLITUS TYPE 2 IN OBESE (HCC): Primary | Chronic | ICD-10-CM

## 2023-08-01 DIAGNOSIS — Z79.01 CHRONIC ANTICOAGULATION: ICD-10-CM

## 2023-08-01 DIAGNOSIS — I10 ESSENTIAL HYPERTENSION: ICD-10-CM

## 2023-08-01 LAB — HBA1C MFR BLD: 5.9 %

## 2023-08-01 PROCEDURE — 4004F PT TOBACCO SCREEN RCVD TLK: CPT | Performed by: STUDENT IN AN ORGANIZED HEALTH CARE EDUCATION/TRAINING PROGRAM

## 2023-08-01 PROCEDURE — G8417 CALC BMI ABV UP PARAM F/U: HCPCS | Performed by: STUDENT IN AN ORGANIZED HEALTH CARE EDUCATION/TRAINING PROGRAM

## 2023-08-01 PROCEDURE — 3074F SYST BP LT 130 MM HG: CPT | Performed by: STUDENT IN AN ORGANIZED HEALTH CARE EDUCATION/TRAINING PROGRAM

## 2023-08-01 PROCEDURE — 3044F HG A1C LEVEL LT 7.0%: CPT | Performed by: STUDENT IN AN ORGANIZED HEALTH CARE EDUCATION/TRAINING PROGRAM

## 2023-08-01 PROCEDURE — G8427 DOCREV CUR MEDS BY ELIG CLIN: HCPCS | Performed by: STUDENT IN AN ORGANIZED HEALTH CARE EDUCATION/TRAINING PROGRAM

## 2023-08-01 PROCEDURE — 1123F ACP DISCUSS/DSCN MKR DOCD: CPT | Performed by: STUDENT IN AN ORGANIZED HEALTH CARE EDUCATION/TRAINING PROGRAM

## 2023-08-01 PROCEDURE — 83037 HB GLYCOSYLATED A1C HOME DEV: CPT | Performed by: STUDENT IN AN ORGANIZED HEALTH CARE EDUCATION/TRAINING PROGRAM

## 2023-08-01 PROCEDURE — 3078F DIAST BP <80 MM HG: CPT | Performed by: STUDENT IN AN ORGANIZED HEALTH CARE EDUCATION/TRAINING PROGRAM

## 2023-08-01 PROCEDURE — 99213 OFFICE O/P EST LOW 20 MIN: CPT | Performed by: STUDENT IN AN ORGANIZED HEALTH CARE EDUCATION/TRAINING PROGRAM

## 2023-08-01 PROCEDURE — 2022F DILAT RTA XM EVC RTNOPTHY: CPT | Performed by: STUDENT IN AN ORGANIZED HEALTH CARE EDUCATION/TRAINING PROGRAM

## 2023-08-01 PROCEDURE — 3017F COLORECTAL CA SCREEN DOC REV: CPT | Performed by: STUDENT IN AN ORGANIZED HEALTH CARE EDUCATION/TRAINING PROGRAM

## 2023-08-01 PROCEDURE — 99212 OFFICE O/P EST SF 10 MIN: CPT | Performed by: STUDENT IN AN ORGANIZED HEALTH CARE EDUCATION/TRAINING PROGRAM

## 2023-08-01 NOTE — PROGRESS NOTES
[address] [place] [address]  Internal Medicine Residency Clinic    Attending Physician Statement  I have discussed the case, including pertinent history and exam findings with the resident physician. I agree with the assessment, plan and orders as documented by the resident. I have reviewed all pertinent PMHx, PSHx, FamHx, SocialHx, medications, and allergies and updated history as appropriate. Patient here for routine follow up of medical problems. Patient has hx HTN, DM, not on insulin that has been relatively well controlled,  obesity, chronic atrial fibrillation on chronic anticoagulation with eliquis. Patient also has hx closure PFO. He has been c/o lightheadedness which he has for a while as well as diarrhea, and other symptoms felt to be secondary to metformin. Denies vertigo and is not orthostatic. Ok to stop and start SGLT2 inhibitor. Would f/u within a month. Remainder of medical problems as per resident note.     Triston Sands DO  8/1/2023 4:13 PM
Cardiovascular:      Rate and Rhythm: Normal rate and regular rhythm. Heart sounds: No murmur heard. No friction rub. No gallop. Pulmonary:      Effort: No respiratory distress. Breath sounds: No stridor. No wheezing, rhonchi or rales. Abdominal:      General: Bowel sounds are normal. There is no distension. Palpations: Abdomen is soft. There is no mass. Tenderness: There is no abdominal tenderness. There is no guarding or rebound. Hernia: No hernia is present. Musculoskeletal:         General: No swelling or tenderness. Right lower leg: No edema. Left lower leg: No edema. Skin:     Coloration: Skin is not jaundiced. Findings: No erythema or rash. Neurological:      Mental Status: He is alert and oriented to person, place, and time. ASSESSMENT/PLAN:  I have reviewed all pertinent PMH, PSH, FH, SH, medications and allergies and updated history as appropriate. Carlos Douglas was seen today for dizziness and diabetes. Diagnoses and all orders for this visit:    Diabetes mellitus type 2 in obese ([address])  -     POCT glycosylated hemoglobin (Hb A1C): 5.9 today  -     Start taking empagliflozin (JARDIANCE) 25 MG tablet;  Take 1 tablet by mouth daily  - discontinue metformin 2/2 side effects  -Patient encouraged to report any suspected side effects to his PCP in the future    Chronic atrial fibrillation (HCC)  -Takes Eliquis for anticoagulation  -On Cardizem for rate control    Essential hypertension  -Patient states he has been compliant with medication, BP typically stays around 834 systolic at home    Chronic anticoagulation  -On Eliquis for A-fib      RTC: 1 month      I have reviewed my findings and recommendations with Onel Pacheco and Dr Eden Egan MD PGY-3  8/2/2023 8:15 AM

## 2023-08-01 NOTE — PATIENT INSTRUCTIONS
Changes in Medications  Stop taking metformin  Start taking jardiance    Please follow up in 4 weeks with our clinic. Please call if your symptoms worsen or fail to improve.

## 2023-08-02 ASSESSMENT — ENCOUNTER SYMPTOMS
ABDOMINAL PAIN: 0
NAUSEA: 1
CONSTIPATION: 0
SHORTNESS OF BREATH: 0
DIARRHEA: 1
SINUS PAIN: 0
COUGH: 0
BACK PAIN: 0
VOMITING: 1
BLOOD IN STOOL: 0

## 2023-08-29 ENCOUNTER — TELEPHONE (OUTPATIENT)
Dept: INTERNAL MEDICINE | Age: 73
End: 2023-08-29

## 2023-08-29 NOTE — TELEPHONE ENCOUNTER
Paul Alfred R ext [phone]  from [facility] a message due to this patient's  2 nd  medical clearance request form being faxed to our office on 8/28. Original request was signed and faxed to [facility] on 8/15/23.

## 2023-09-12 ENCOUNTER — OFFICE VISIT (OUTPATIENT)
Dept: INTERNAL MEDICINE | Age: 73
End: 2023-09-12
Payer: MEDICARE

## 2023-09-12 VITALS
HEIGHT: 74 IN | SYSTOLIC BLOOD PRESSURE: 119 MMHG | DIASTOLIC BLOOD PRESSURE: 76 MMHG | HEART RATE: 72 BPM | WEIGHT: 315 LBS | RESPIRATION RATE: 14 BRPM | OXYGEN SATURATION: 94 % | TEMPERATURE: 97.2 F | BODY MASS INDEX: 40.43 KG/M2

## 2023-09-12 DIAGNOSIS — I48.20 CHRONIC ATRIAL FIBRILLATION (HCC): ICD-10-CM

## 2023-09-12 DIAGNOSIS — H91.93 BILATERAL HEARING LOSS, UNSPECIFIED HEARING LOSS TYPE: ICD-10-CM

## 2023-09-12 DIAGNOSIS — R35.1 BENIGN PROSTATIC HYPERPLASIA WITH NOCTURIA: ICD-10-CM

## 2023-09-12 DIAGNOSIS — E78.5 HYPERLIPIDEMIA, UNSPECIFIED HYPERLIPIDEMIA TYPE: ICD-10-CM

## 2023-09-12 DIAGNOSIS — N40.1 BENIGN PROSTATIC HYPERPLASIA WITH NOCTURIA: ICD-10-CM

## 2023-09-12 DIAGNOSIS — Z87.891 PERSONAL HISTORY OF TOBACCO USE: Primary | ICD-10-CM

## 2023-09-12 PROCEDURE — G0296 VISIT TO DETERM LDCT ELIG: HCPCS

## 2023-09-12 RX ORDER — POLYETHYLENE GLYCOL 3350 17 G
2 POWDER IN PACKET (EA) ORAL PRN
Qty: 100 EACH | Refills: 3 | Status: SHIPPED | OUTPATIENT
Start: 2023-09-12

## 2023-09-12 RX ORDER — DILTIAZEM HYDROCHLORIDE 120 MG/1
120 CAPSULE, COATED, EXTENDED RELEASE ORAL DAILY
Qty: 90 CAPSULE | Refills: 1 | Status: SHIPPED | OUTPATIENT
Start: 2023-09-12

## 2023-09-12 RX ORDER — ATORVASTATIN CALCIUM 40 MG/1
40 TABLET, FILM COATED ORAL NIGHTLY
Qty: 90 TABLET | Refills: 1 | Status: SHIPPED | OUTPATIENT
Start: 2023-09-12

## 2023-09-12 RX ORDER — DIAPER,BRIEF,INFANT-TODD,DISP
EACH MISCELLANEOUS
Qty: 28 G | Refills: 0 | Status: SHIPPED | OUTPATIENT
Start: 2023-09-12

## 2023-09-12 RX ORDER — TAMSULOSIN HYDROCHLORIDE 0.4 MG/1
0.4 CAPSULE ORAL DAILY
Qty: 90 CAPSULE | Refills: 1 | Status: SHIPPED | OUTPATIENT
Start: 2023-09-12

## 2023-09-12 SDOH — ECONOMIC STABILITY: INCOME INSECURITY: HOW HARD IS IT FOR YOU TO PAY FOR THE VERY BASICS LIKE FOOD, HOUSING, MEDICAL CARE, AND HEATING?: HARD

## 2023-09-12 SDOH — ECONOMIC STABILITY: FOOD INSECURITY: WITHIN THE PAST 12 MONTHS, THE FOOD YOU BOUGHT JUST DIDN'T LAST AND YOU DIDN'T HAVE MONEY TO GET MORE.: OFTEN TRUE

## 2023-09-12 SDOH — ECONOMIC STABILITY: FOOD INSECURITY: WITHIN THE PAST 12 MONTHS, YOU WORRIED THAT YOUR FOOD WOULD RUN OUT BEFORE YOU GOT MONEY TO BUY MORE.: OFTEN TRUE

## 2023-09-12 ASSESSMENT — ENCOUNTER SYMPTOMS
ABDOMINAL PAIN: 0
CHEST TIGHTNESS: 0
TROUBLE SWALLOWING: 0
SORE THROAT: 0
DIARRHEA: 0
VOICE CHANGE: 0
BLOOD IN STOOL: 0
NAUSEA: 0
WHEEZING: 0
VOMITING: 0
SHORTNESS OF BREATH: 0
CONSTIPATION: 0
COUGH: 1

## 2023-09-12 NOTE — PATIENT INSTRUCTIONS
Dear Leia Root,        Thank you for coming to your appointment today. I hope we have addressed all of your needs. Please make sure to do the following:  - Continue your medications as listed. - Referrals have been made to Otology :  If you do not hear from the office in 1 week, please call the number listed. - We will see each other again in 5 weeks         Have a great day!         Sincerely,  Alyssa Edmond MD  9/12/2023  4:31 PM
increased thirst and urination

## 2023-09-12 NOTE — PROGRESS NOTES
[address] [facility]  Internal Medicine Residency Clinic    Attending Physician Statement  I have discussed the case, including pertinent history and exam findings with the resident physician. I have seen and examined the patient and the key elements of the encounter have been performed by me. I agree with the assessment, plan and orders as documented by the resident. I have reviewed all pertinent PMHx, PSHx, FamHx, SocialHx, medications, and allergies and updated history as appropriate. Patient here for routine follow up of medical problems. Chronic Afib   -s.p PFO closure  -continue rate control and eliquis     NIDDM2  --160  -continue SGLT2 inhibitor   -dietary and exercise compliance discussed     HTN  -controlled; continue current regimen     Hx CVA  -continue anticoagulation and statin     Tinnitis  -meclizine improved his symtpmos  -referral to audiology     Tobacco Abuse   -discussed cessation   -patient does not like the patches and unable to chew the gum  -lozenges prescribed     Remainder of medical problems as per resident note.     Jerald Curiel DO  9/12/2023 4:17 PM
Pt screened positive for SDOH related to financial strain, transportation, housing and or food insecurity and declined further contact for assessment/resources.
Cardizem for rate control    Lumbar radiculopathy  Follows with Dr. Ashley Diaz. 3/28/2023 steroid injection to back    BPH   Continue flomax     History of frontal ischemic stroke on 6/24/2018  On Lipitor 20 mg daily and Eliquis as above    Hyperlipidemia   -continue Lipitor 40 mg     Hearing referral   CT lung screen was ordered     RTC:  No follow-ups on file. I have reviewed my findings and recommendations with Neris Dill and Dr. Madison Vasquez. Keyla Martinez MD   9/11/2023 8:48 PM     Discussed with the patient the current USPSTF guidelines released March 9, 2021 for screening for lung cancer. For adults aged 48 to 80 years who have a 20 pack-year smoking history and currently smoke or have quit within the past 15 years the grade B recommendation is to:  Screen for lung cancer with low-dose computed tomography (LDCT) every year. Stop screening once a person has not smoked for 15 years or has a health problem that limits life expectancy or the ability to have lung surgery. The patient  reports that he has been smoking cigarettes and cigars. He started smoking about 19 years ago. He has a 25.50 pack-year smoking history. He has never used smokeless tobacco.. Discussed with patient the risks and benefits of screening, including over-diagnosis, false positive rate, and total radiation exposure. The patient currently exhibits no signs or symptoms suggestive of lung cancer. Discussed with patient the importance of compliance with yearly annual lung cancer screenings and willingness to undergo diagnosis and treatment if screening scan is positive. In addition, the patient was counseled regarding the importance of remaining smoke free and/or total smoking cessation.     Also reviewed the following if the patient has Medicare that as of February 10, 2022, Medicare only covers LDCT screening in patients aged 53-69 with at least a 20 pack-year smoking history who currently smoke or have quit in the last 13

## 2023-09-13 ENCOUNTER — TELEPHONE (OUTPATIENT)
Dept: INTERNAL MEDICINE | Age: 73
End: 2023-09-13

## 2023-09-13 NOTE — TELEPHONE ENCOUNTER
Unable to understand what patient needs. Call made to [phone] and message left. Asked them to please contact me and let me know what they are looking for.   Miranda Doherty LPN'

## 2023-09-13 NOTE — TELEPHONE ENCOUNTER
----- Message from Baldemar Walton sent at 9/13/2023 10:58 AM EDT -----  Subject: Message to Provider    QUESTIONS  Information for Provider? Patient needs results of screening from test in   March . His pain mgmt doctor is looking for this. He said they faxed info   to us please call patient back   ---------------------------------------------------------------------------  --------------  [place]  [phone]; Do not leave any message, patient will call back for answer  ---------------------------------------------------------------------------  --------------  SCRIPT ANSWERS  Relationship to Patient?  Self

## 2023-09-18 ENCOUNTER — TELEPHONE (OUTPATIENT)
Dept: AUDIOLOGY | Age: 73
End: 2023-09-18

## 2023-09-18 NOTE — TELEPHONE ENCOUNTER
Referral for Hearing Evaluation received. Called patient and left a voicemail for the patient to call back for scheduling.     Electronically signed by Krysta Billings on 9/18/23 at 12:34 PM EDT

## 2023-10-03 ENCOUNTER — HOSPITAL ENCOUNTER (OUTPATIENT)
Dept: AUDIOLOGY | Age: 73
Discharge: HOME | End: 2023-10-03
Payer: MEDICARE

## 2023-10-03 PROCEDURE — 92567 TYMPANOMETRY: CPT | Performed by: AUDIOLOGIST

## 2023-10-03 PROCEDURE — 69210 REMOVE IMPACTED EAR WAX UNI: CPT | Performed by: AUDIOLOGIST

## 2023-10-18 ENCOUNTER — OFFICE VISIT (OUTPATIENT)
Dept: INTERNAL MEDICINE | Age: 73
End: 2023-10-18
Payer: MEDICARE

## 2023-10-18 VITALS
HEART RATE: 74 BPM | RESPIRATION RATE: 18 BRPM | WEIGHT: 315 LBS | HEIGHT: 74 IN | DIASTOLIC BLOOD PRESSURE: 63 MMHG | BODY MASS INDEX: 40.43 KG/M2 | OXYGEN SATURATION: 96 % | TEMPERATURE: 97.8 F | SYSTOLIC BLOOD PRESSURE: 112 MMHG

## 2023-10-18 DIAGNOSIS — M48.00 MULTILEVEL FORAMINAL STENOSIS: Primary | ICD-10-CM

## 2023-10-18 DIAGNOSIS — E11.69 DIABETES MELLITUS TYPE 2 IN OBESE (HCC): Chronic | ICD-10-CM

## 2023-10-18 DIAGNOSIS — E51.9 THIAMINE DEFICIENCY, UNSPECIFIED: ICD-10-CM

## 2023-10-18 DIAGNOSIS — E55.9 VITAMIN D DEFICIENCY: ICD-10-CM

## 2023-10-18 DIAGNOSIS — G45.9 TIA (TRANSIENT ISCHEMIC ATTACK): ICD-10-CM

## 2023-10-18 DIAGNOSIS — E66.9 DIABETES MELLITUS TYPE 2 IN OBESE (HCC): Chronic | ICD-10-CM

## 2023-10-18 DIAGNOSIS — K59.03 DRUG-INDUCED CONSTIPATION: ICD-10-CM

## 2023-10-18 PROCEDURE — G0439 PPPS, SUBSEQ VISIT: HCPCS

## 2023-10-18 PROCEDURE — G8484 FLU IMMUNIZE NO ADMIN: HCPCS

## 2023-10-18 PROCEDURE — 3074F SYST BP LT 130 MM HG: CPT

## 2023-10-18 PROCEDURE — 3078F DIAST BP <80 MM HG: CPT

## 2023-10-18 PROCEDURE — 3017F COLORECTAL CA SCREEN DOC REV: CPT

## 2023-10-18 PROCEDURE — 1123F ACP DISCUSS/DSCN MKR DOCD: CPT

## 2023-10-18 PROCEDURE — 3044F HG A1C LEVEL LT 7.0%: CPT

## 2023-10-18 PROCEDURE — 90694 VACC AIIV4 NO PRSRV 0.5ML IM: CPT | Performed by: INTERNAL MEDICINE

## 2023-10-18 PROCEDURE — 99212 OFFICE O/P EST SF 10 MIN: CPT

## 2023-10-18 RX ORDER — OXYCODONE HYDROCHLORIDE 30 MG/1
TABLET, FILM COATED, EXTENDED RELEASE ORAL
COMMUNITY
Start: 2023-10-12

## 2023-10-18 RX ORDER — MELATONIN
1000 DAILY
Qty: 90 TABLET | Refills: 1 | Status: SHIPPED | OUTPATIENT
Start: 2023-10-18

## 2023-10-18 RX ORDER — THIAMINE MONONITRATE (VIT B1) 100 MG
100 TABLET ORAL DAILY
Qty: 90 TABLET | Refills: 1 | Status: SHIPPED | OUTPATIENT
Start: 2023-10-18

## 2023-10-18 ASSESSMENT — LIFESTYLE VARIABLES
HOW OFTEN DURING THE LAST YEAR HAVE YOU HAD A FEELING OF GUILT OR REMORSE AFTER DRINKING: 0
HAVE YOU OR SOMEONE ELSE BEEN INJURED AS A RESULT OF YOUR DRINKING: 0
HOW MANY STANDARD DRINKS CONTAINING ALCOHOL DO YOU HAVE ON A TYPICAL DAY: 5 OR 6
HOW OFTEN DO YOU HAVE A DRINK CONTAINING ALCOHOL: 2-4 TIMES A MONTH
HOW OFTEN DURING THE LAST YEAR HAVE YOU FAILED TO DO WHAT WAS NORMALLY EXPECTED FROM YOU BECAUSE OF DRINKING: 0
HOW OFTEN DURING THE LAST YEAR HAVE YOU NEEDED AN ALCOHOLIC DRINK FIRST THING IN THE MORNING TO GET YOURSELF GOING AFTER A NIGHT OF HEAVY DRINKING: 0
HAS A RELATIVE, FRIEND, DOCTOR, OR ANOTHER HEALTH PROFESSIONAL EXPRESSED CONCERN ABOUT YOUR DRINKING OR SUGGESTED YOU CUT DOWN: 0
HOW OFTEN DURING THE LAST YEAR HAVE YOU FOUND THAT YOU WERE NOT ABLE TO STOP DRINKING ONCE YOU HAD STARTED: 0
HOW OFTEN DURING THE LAST YEAR HAVE YOU BEEN UNABLE TO REMEMBER WHAT HAPPENED THE NIGHT BEFORE BECAUSE YOU HAD BEEN DRINKING: 0

## 2023-10-18 ASSESSMENT — PATIENT HEALTH QUESTIONNAIRE - PHQ9
1. LITTLE INTEREST OR PLEASURE IN DOING THINGS: 0
6. FEELING BAD ABOUT YOURSELF - OR THAT YOU ARE A FAILURE OR HAVE LET YOURSELF OR YOUR FAMILY DOWN: 0
4. FEELING TIRED OR HAVING LITTLE ENERGY: 0
SUM OF ALL RESPONSES TO PHQ QUESTIONS 1-9: 3
SUM OF ALL RESPONSES TO PHQ QUESTIONS 1-9: 3
5. POOR APPETITE OR OVEREATING: 3
SUM OF ALL RESPONSES TO PHQ QUESTIONS 1-9: 3
10. IF YOU CHECKED OFF ANY PROBLEMS, HOW DIFFICULT HAVE THESE PROBLEMS MADE IT FOR YOU TO DO YOUR WORK, TAKE CARE OF THINGS AT HOME, OR GET ALONG WITH OTHER PEOPLE: 0
8. MOVING OR SPEAKING SO SLOWLY THAT OTHER PEOPLE COULD HAVE NOTICED. OR THE OPPOSITE, BEING SO FIGETY OR RESTLESS THAT YOU HAVE BEEN MOVING AROUND A LOT MORE THAN USUAL: 0
2. FEELING DOWN, DEPRESSED OR HOPELESS: 0
9. THOUGHTS THAT YOU WOULD BE BETTER OFF DEAD, OR OF HURTING YOURSELF: 0
3. TROUBLE FALLING OR STAYING ASLEEP: 0
SUM OF ALL RESPONSES TO PHQ9 QUESTIONS 1 & 2: 0
SUM OF ALL RESPONSES TO PHQ QUESTIONS 1-9: 3
7. TROUBLE CONCENTRATING ON THINGS, SUCH AS READING THE NEWSPAPER OR WATCHING TELEVISION: 0

## 2023-10-18 NOTE — PATIENT INSTRUCTIONS
Dear Corry Soria,        Thank you for coming to your appointment today. I hope we have addressed all of your needs. Please make sure to do the following:  - Continue your medications as listed. - Please call our office next week to speak with Kathy Orourke about the prescription assistance program  - Referrals have been made to Physical therapy : If you do not hear from the office in 1 week, please call the number listed. - We will see each other again in 2 months        Have a great day!         Sincerely,  Rosemary Coats MD  10/18/2023  10:03 AM

## 2023-10-23 ENCOUNTER — TELEPHONE (OUTPATIENT)
Dept: INTERNAL MEDICINE | Age: 73
End: 2023-10-23

## 2023-10-23 NOTE — TELEPHONE ENCOUNTER
Pt contacted LSW re: direction for possible change of insurance.   Provided pt with [facility] info as well names, addresses and phone numbers of local navigators to schedule appointment and info to bring to appt

## 2023-10-26 ENCOUNTER — HOSPITAL ENCOUNTER (OUTPATIENT)
Dept: PHYSICAL THERAPY | Age: 73
Setting detail: THERAPIES SERIES
Discharge: HOME | End: 2023-10-26

## 2023-10-26 NOTE — PROGRESS NOTES
[address]                Phone: [phone]  Fax: [phone]    Physical Therapy  Cancellation/No-show Note  Patient Name:  Kash Fox  :  1950   Date:  10/26/2023    For today's appointment patient:  []  Cancelled  []  Rescheduled appointment  [x]  No-show     Reason given by patient:  []  Patient ill  []  Conflicting appointment  []  No transportation    []  Conflict with work  [x]  No reason given  []  Other:     Comments:  pt was a no-show for initial evaluation. Electronically signed by:   Vince Delcid PT

## 2023-11-23 NOTE — PROGRESS NOTES
[address]  InternalMedicine Residency Program  ACC Note      SUBJECTIVE:  CC: had concerns including 6 Month Follow-Up, Diabetes, Hypertension, and Medication Problem. Shai Farias presented to the [address] [facility] for a routine visit. Aleatha Blizzard is a 79 y/o male with a PMHx of T2DM, HTN, HLD, LATISHA, CVA, TIA, and PFO s/p closure who presents for a follow up. HTN: Patient advised lisinopril 5 mg daily currently patient does not check blood pressures at home. Patient denies headache, chest pain, neck swelling. Blood pressures 125/62 mmHg today. T2DM: Patient is currently on Metformin 500 mg twice a day. Hemoglobin A1c 6.5 8/18/2021. Patient denies hypoglycemic symptoms. Denies change in vision recently. Patient will attend diabetes education classes. Patient lost 5 pounds over 5 months. Patient is willing to lose more weight. Hyperlipidemia: Last LDL was 43 5/2020. Patient stated Lipitor made him headache. Afib: Currently taking Eliquis 5 mg twice daily. Denies blood in urine, blood in the stool. Chronic low back pain: Patient is following with pain management office. Patient denies fever, chills, sore throat, shortness of breath. Review of Systems   Constitutional: Negative for chills, fatigue, fever and unexpected weight change. HENT: Negative for congestion and rhinorrhea. Respiratory: Negative for cough and shortness of breath. Cardiovascular: Negative for chest pain and palpitations. Gastrointestinal: Negative for abdominal pain, constipation, diarrhea and vomiting. Genitourinary: Negative for dysuria and frequency. Musculoskeletal: Negative for arthralgias and back pain. Skin: Negative for color change, pallor and wound. Neurological: Negative for syncope and light-headedness. Psychiatric/Behavioral: Negative for dysphoric mood. The patient is not nervous/anxious.         Outpatient Medications Marked as Taking for the 8/23/21 encounter (Office Visit) with Kirk Angel MD   Medication Sig Dispense Refill    meclizine (ANTIVERT) 25 MG tablet Take 1 tablet by mouth 2 times daily as needed for Dizziness 60 tablet 0    atorvastatin (LIPITOR) 40 MG tablet Take 1 tablet by mouth nightly 30 tablet 0    metFORMIN (GLUCOPHAGE) 500 MG tablet Take 1 tablet by mouth 2 times daily (with meals) 60 tablet 1    lisinopril (PRINIVIL;ZESTRIL) 5 MG tablet Take 2 tablets by mouth daily 30 tablet 1    apixaban (ELIQUIS) 5 MG TABS tablet Take 1 tablet by mouth 2 times daily 60 tablet 1    aspirin EC 81 MG EC tablet Take 1 tablet by mouth daily 30 tablet 3    blood glucose monitor strips 2 times daily. 100 strip 1    OXYCONTIN 60 MG T12A extended release tablet       OXYCONTIN 80 MG extended release tablet       blood glucose monitor kit and supplies Test 2 times a day before breakfast and supper 1 kit 0    oxyCODONE (ROXICODONE) 5 MG immediate release tablet Take 5 mg by mouth every 6 hours as needed. Ordered through [facility]         I have reviewed all pertinent PMHx, PSHx, FamHx, SocialHx, medications, and allergiesand updated history as appropriate. OBJECTIVE:    VS: /62 (Site: Right Upper Arm, Position: Sitting, Cuff Size: Medium Adult)   Pulse 68   Temp 98.6 °F (37 °C) (Oral)   Resp 20   Ht 6' 1.75\" (1.873 m)   Wt (!) 346 lb 11.2 oz (157.3 kg)   SpO2 99%   BMI 44.82 kg/m²   Physical Exam  Constitutional:       General: He is not in acute distress. Appearance: He is well-developed. HENT:      Head: Normocephalic and atraumatic. Eyes:      General: No scleral icterus. Conjunctiva/sclera: Conjunctivae normal.   Neck:      Trachea: No tracheal deviation. Cardiovascular:      Rate and Rhythm: Normal rate. Heart sounds: No murmur heard. Pulmonary:      Effort: Pulmonary effort is normal. No respiratory distress. Breath sounds: Normal breath sounds.    Abdominal:      General: Bowel sounds are normal. Adequate: hears normal conversation without difficulty

## 2023-12-27 ENCOUNTER — OFFICE VISIT (OUTPATIENT)
Dept: INTERNAL MEDICINE | Age: 73
End: 2023-12-27
Payer: MEDICARE

## 2023-12-27 VITALS
TEMPERATURE: 97.7 F | HEART RATE: 58 BPM | RESPIRATION RATE: 18 BRPM | HEIGHT: 74 IN | DIASTOLIC BLOOD PRESSURE: 60 MMHG | WEIGHT: 315 LBS | OXYGEN SATURATION: 95 % | SYSTOLIC BLOOD PRESSURE: 129 MMHG | BODY MASS INDEX: 40.43 KG/M2

## 2023-12-27 DIAGNOSIS — E66.9 DIABETES MELLITUS TYPE 2 IN OBESE (HCC): ICD-10-CM

## 2023-12-27 DIAGNOSIS — H61.23 BILATERAL IMPACTED CERUMEN: ICD-10-CM

## 2023-12-27 DIAGNOSIS — M54.16 LUMBAR RADICULOPATHY: ICD-10-CM

## 2023-12-27 DIAGNOSIS — E78.5 HYPERLIPIDEMIA, UNSPECIFIED HYPERLIPIDEMIA TYPE: Primary | ICD-10-CM

## 2023-12-27 DIAGNOSIS — E11.69 DIABETES MELLITUS TYPE 2 IN OBESE (HCC): ICD-10-CM

## 2023-12-27 DIAGNOSIS — E66.01 CLASS 3 SEVERE OBESITY DUE TO EXCESS CALORIES WITH SERIOUS COMORBIDITY AND BODY MASS INDEX (BMI) OF 45.0 TO 49.9 IN ADULT (HCC): ICD-10-CM

## 2023-12-27 PROCEDURE — 3078F DIAST BP <80 MM HG: CPT | Performed by: INTERNAL MEDICINE

## 2023-12-27 PROCEDURE — 99214 OFFICE O/P EST MOD 30 MIN: CPT

## 2023-12-27 PROCEDURE — 69210 REMOVE IMPACTED EAR WAX UNI: CPT | Performed by: INTERNAL MEDICINE

## 2023-12-27 PROCEDURE — 4004F PT TOBACCO SCREEN RCVD TLK: CPT | Performed by: INTERNAL MEDICINE

## 2023-12-27 PROCEDURE — 2022F DILAT RTA XM EVC RTNOPTHY: CPT | Performed by: INTERNAL MEDICINE

## 2023-12-27 PROCEDURE — 69209 REMOVE IMPACTED EAR WAX UNI: CPT

## 2023-12-27 PROCEDURE — 3017F COLORECTAL CA SCREEN DOC REV: CPT | Performed by: INTERNAL MEDICINE

## 2023-12-27 PROCEDURE — G8417 CALC BMI ABV UP PARAM F/U: HCPCS | Performed by: INTERNAL MEDICINE

## 2023-12-27 PROCEDURE — G8484 FLU IMMUNIZE NO ADMIN: HCPCS | Performed by: INTERNAL MEDICINE

## 2023-12-27 PROCEDURE — 3074F SYST BP LT 130 MM HG: CPT | Performed by: INTERNAL MEDICINE

## 2023-12-27 PROCEDURE — 99212 OFFICE O/P EST SF 10 MIN: CPT

## 2023-12-27 PROCEDURE — 3044F HG A1C LEVEL LT 7.0%: CPT | Performed by: INTERNAL MEDICINE

## 2023-12-27 PROCEDURE — G8427 DOCREV CUR MEDS BY ELIG CLIN: HCPCS | Performed by: INTERNAL MEDICINE

## 2023-12-27 RX ORDER — CHLORAL HYDRATE 500 MG
1000 CAPSULE ORAL DAILY
Qty: 90 CAPSULE | Refills: 1 | Status: SHIPPED | OUTPATIENT
Start: 2023-12-27

## 2023-12-27 NOTE — PROGRESS NOTES
[facility]  Internal Medicine Residency Clinic    Attending Physician Statement  I have discussed the case, including pertinent history and exam findings with the resident physician.I have seen and examined the patient and the key elements of the encounter have been performed by me. I agree with the assessment, plan and orders as documented by the resident. I have reviewed the relevant PMHx, PSHx, FamHx, SocialHx, medications, and allergies and updated history as appropriate.    Patient presents for routine follow up of medical problems.    DM 2  Controlled on Jardiance, follows with opthalmology and recommend continued follow up, wearing reading glasses; hx of cataract.    Chronic lumbar pain with sciatica from bilateral severe foraminal stenosis  [facility] and received DESMOND previously and on chronic opiate therapy; referral for aqua therapy placed today.    PAF   Continues on Eliquis and diltiazem    Left ear cerumen impaction   Irrigation in office with some success by RN  Recommend course of debrox and schedule for irrigation in  1 week for RN visit for reevaluation.    Obesity class 3 BMI 42  Recommend lifestyle mods and consider GLP-1 RA in future, destiny if increasing A1c    Screening:   LDCT -- needs to be scheduled, ordered in 9/2023   AAA screen -- completed 7/2023 and negative     Remainder of medical problems as per resident note.    Alejandro Medina, DO  12/27/2023 10:47 AM

## 2023-12-27 NOTE — PROGRESS NOTES
11:40 Bilateral ears irrigated per ordered with 2:1 Peroxide /water solution tolerated well solution returned light brown flec with pieces of thick brown cerumen left ear was worst than right following procedure pt states can hear much better Dr. Medina here to exam ears following irrigation ordered pt ear drops to apply bilaterally and return next week for another ear irrigation if needed

## 2023-12-27 NOTE — PATIENT INSTRUCTIONS
Dear Ángel Adams,        Thank you for coming to your appointment today. I hope we have addressed all of your needs.       Please make sure to do the following:  - Continue your medications as listed.   - Add 5 drops in each ear for 5 days and then we will see you back January   - Get labs drawn before our next follow up. We will call you with the results   - [facility] :  If you do not hear from the office in 1 week, please call the number listed.  - We will see each other again in 4 months     Call for a sooner appointment if you have any concerns    Have a great day!        Sincerely,  Kayleigh Mullins MD  12/27/2023  11:25 AM

## 2023-12-28 ENCOUNTER — HOSPITAL ENCOUNTER (OUTPATIENT)
Age: 73
Discharge: HOME | End: 2023-12-28
Payer: MEDICARE

## 2023-12-28 DIAGNOSIS — E66.9 DIABETES MELLITUS TYPE 2 IN OBESE (HCC): ICD-10-CM

## 2023-12-28 DIAGNOSIS — E11.69 DIABETES MELLITUS TYPE 2 IN OBESE (HCC): ICD-10-CM

## 2023-12-28 LAB
ANION GAP SERPL CALCULATED.3IONS-SCNC: 12 MMOL/L (ref 7–16)
BASOPHILS # BLD: 0.03 K/UL (ref 0–0.2)
BASOPHILS NFR BLD: 1 % (ref 0–2)
BUN SERPL-MCNC: 15 MG/DL (ref 6–23)
CALCIUM SERPL-MCNC: 8.8 MG/DL (ref 8.6–10.2)
CHLORIDE SERPL-SCNC: 102 MMOL/L (ref 98–107)
CHOLEST SERPL-MCNC: 134 MG/DL
CO2 SERPL-SCNC: 24 MMOL/L (ref 22–29)
CREAT SERPL-MCNC: 1 MG/DL (ref 0.7–1.2)
CREAT UR-MCNC: 155.3 MG/DL (ref 40–278)
EOSINOPHIL # BLD: 0.1 K/UL (ref 0.05–0.5)
EOSINOPHILS RELATIVE PERCENT: 2 % (ref 0–6)
ERYTHROCYTE [DISTWIDTH] IN BLOOD BY AUTOMATED COUNT: 12.3 % (ref 11.5–15)
GFR SERPL CREATININE-BSD FRML MDRD: >60 ML/MIN/1.73M2
GLUCOSE SERPL-MCNC: 191 MG/DL (ref 74–99)
HCT VFR BLD AUTO: 39.3 % (ref 37–54)
HDLC SERPL-MCNC: 58 MG/DL
HGB BLD-MCNC: 12.6 G/DL (ref 12.5–16.5)
IMM GRANULOCYTES # BLD AUTO: <0.03 K/UL (ref 0–0.58)
IMM GRANULOCYTES NFR BLD: 0 % (ref 0–5)
LDLC SERPL CALC-MCNC: 58 MG/DL
LYMPHOCYTES NFR BLD: 2.43 K/UL (ref 1.5–4)
LYMPHOCYTES RELATIVE PERCENT: 42 % (ref 20–42)
MCH RBC QN AUTO: 29.7 PG (ref 26–35)
MCHC RBC AUTO-ENTMCNC: 32.1 G/DL (ref 32–34.5)
MCV RBC AUTO: 92.7 FL (ref 80–99.9)
MONOCYTES NFR BLD: 0.56 K/UL (ref 0.1–0.95)
MONOCYTES NFR BLD: 10 % (ref 2–12)
NEUTROPHILS NFR BLD: 45 % (ref 43–80)
NEUTS SEG NFR BLD: 2.6 K/UL (ref 1.8–7.3)
PLATELET # BLD AUTO: 253 K/UL (ref 130–450)
PMV BLD AUTO: 8.8 FL (ref 7–12)
POTASSIUM SERPL-SCNC: 4.6 MMOL/L (ref 3.5–5)
RBC # BLD AUTO: 4.24 M/UL (ref 3.8–5.8)
SODIUM SERPL-SCNC: 138 MMOL/L (ref 132–146)
TOTAL PROTEIN, URINE: 8 MG/DL (ref 0–12)
TRIGL SERPL-MCNC: 88 MG/DL
URINE TOTAL PROTEIN CREATININE RATIO: 0.05 (ref 0–0.2)
VLDLC SERPL CALC-MCNC: 18 MG/DL
WBC OTHER # BLD: 5.7 K/UL (ref 4.5–11.5)

## 2023-12-28 PROCEDURE — 82570 ASSAY OF URINE CREATININE: CPT

## 2023-12-28 PROCEDURE — 80061 LIPID PANEL: CPT

## 2023-12-28 PROCEDURE — 84156 ASSAY OF PROTEIN URINE: CPT

## 2023-12-28 PROCEDURE — 80048 BASIC METABOLIC PNL TOTAL CA: CPT

## 2023-12-28 PROCEDURE — 85025 COMPLETE CBC W/AUTO DIFF WBC: CPT

## 2023-12-28 PROCEDURE — 36415 COLL VENOUS BLD VENIPUNCTURE: CPT

## 2024-01-02 ENCOUNTER — TELEPHONE (OUTPATIENT)
Dept: INTERNAL MEDICINE | Age: 74
End: 2024-01-02

## 2024-01-02 NOTE — TELEPHONE ENCOUNTER
Return call to pt. Requesting recent results of Labs done so he can make pain clinic aware of them , informed he has an appt. Scheduled for  1/3/24 for an ear irrigation states \"I'm canceling it I can hear real good since you irrigated my ears last week.\"  Printed Labs will be sent to pt per his request, after reviewing with a Physcian

## 2024-01-02 NOTE — TELEPHONE ENCOUNTER
----- Message from Erika Bonilla sent at 1/2/2024 10:13 AM EST -----  Subject: Results Request    QUESTIONS  Results: Bloodwork checking kidneys wants paperwork to take to pain   doctor.; Ordered by: YULISSA STEARNS   Date Performed: 2023-12-28  ---------------------------------------------------------------------------  --------------  CALL BACK INFO    [phone]; Do not leave any message, patient will call back for answer  ---------------------------------------------------------------------------  --------------

## 2024-02-05 ENCOUNTER — HOSPITAL ENCOUNTER (OUTPATIENT)
Dept: CT IMAGING | Age: 74
Discharge: HOME | End: 2024-02-07
Payer: MEDICARE

## 2024-02-05 ENCOUNTER — OFFICE VISIT (OUTPATIENT)
Dept: INTERNAL MEDICINE | Age: 74
End: 2024-02-05
Payer: MEDICARE

## 2024-02-05 VITALS
HEART RATE: 55 BPM | DIASTOLIC BLOOD PRESSURE: 65 MMHG | SYSTOLIC BLOOD PRESSURE: 134 MMHG | HEIGHT: 74 IN | OXYGEN SATURATION: 95 % | RESPIRATION RATE: 18 BRPM | WEIGHT: 315 LBS | BODY MASS INDEX: 40.43 KG/M2 | TEMPERATURE: 97.3 F

## 2024-02-05 DIAGNOSIS — E78.5 HYPERLIPIDEMIA, UNSPECIFIED HYPERLIPIDEMIA TYPE: ICD-10-CM

## 2024-02-05 DIAGNOSIS — N20.0 KIDNEY STONE: ICD-10-CM

## 2024-02-05 DIAGNOSIS — E55.9 VITAMIN D DEFICIENCY: ICD-10-CM

## 2024-02-05 DIAGNOSIS — E66.01 OBESITY, CLASS III, BMI 40-49.9 (MORBID OBESITY) (HCC): ICD-10-CM

## 2024-02-05 DIAGNOSIS — D68.9 COAGULOPATHY (HCC): ICD-10-CM

## 2024-02-05 DIAGNOSIS — G45.9 TIA (TRANSIENT ISCHEMIC ATTACK): ICD-10-CM

## 2024-02-05 DIAGNOSIS — R10.9 FLANK PAIN: Primary | ICD-10-CM

## 2024-02-05 DIAGNOSIS — Z87.891 PERSONAL HISTORY OF TOBACCO USE: ICD-10-CM

## 2024-02-05 DIAGNOSIS — N40.1 BENIGN PROSTATIC HYPERPLASIA WITH NOCTURIA: ICD-10-CM

## 2024-02-05 DIAGNOSIS — I48.20 CHRONIC ATRIAL FIBRILLATION (HCC): ICD-10-CM

## 2024-02-05 DIAGNOSIS — R35.1 BENIGN PROSTATIC HYPERPLASIA WITH NOCTURIA: ICD-10-CM

## 2024-02-05 LAB
BACTERIA: ABNORMAL
BILIRUBIN URINE: NEGATIVE
BILIRUBIN, POC: NEGATIVE
BLOOD URINE, POC: NEGATIVE
CLARITY, POC: CLEAR
COLOR, POC: NORMAL
COLOR: YELLOW
EPITHELIAL CELLS UA: ABNORMAL /HPF
GLUCOSE URINE, POC: NEGATIVE
GLUCOSE URINE: NEGATIVE MG/DL
KETONES, POC: NEGATIVE
KETONES, URINE: NEGATIVE MG/DL
LEUKOCYTE EST, POC: NEGATIVE
LEUKOCYTE ESTERASE, URINE: NEGATIVE
NITRITE, POC: NEGATIVE
NITRITE, URINE: NEGATIVE
PH UA: 6 (ref 5–9)
PH, POC: 6
PROTEIN UA: NEGATIVE MG/DL
PROTEIN, POC: NEGATIVE
RBC UA: ABNORMAL /HPF
SPECIFIC GRAVITY UA: 1.02 (ref 1–1.03)
SPECIFIC GRAVITY, POC: 1.03
TURBIDITY: CLEAR
URINE HGB: NEGATIVE
UROBILINOGEN, POC: NORMAL
UROBILINOGEN, URINE: 0.2 EU/DL (ref 0–1)
WBC UA: ABNORMAL /HPF

## 2024-02-05 PROCEDURE — 99214 OFFICE O/P EST MOD 30 MIN: CPT

## 2024-02-05 PROCEDURE — 81002 URINALYSIS NONAUTO W/O SCOPE: CPT

## 2024-02-05 PROCEDURE — 71271 CT THORAX LUNG CANCER SCR C-: CPT

## 2024-02-05 PROCEDURE — 3078F DIAST BP <80 MM HG: CPT

## 2024-02-05 PROCEDURE — 3075F SYST BP GE 130 - 139MM HG: CPT

## 2024-02-05 PROCEDURE — G8427 DOCREV CUR MEDS BY ELIG CLIN: HCPCS

## 2024-02-05 PROCEDURE — 4004F PT TOBACCO SCREEN RCVD TLK: CPT

## 2024-02-05 PROCEDURE — 74176 CT ABD & PELVIS W/O CONTRAST: CPT

## 2024-02-05 PROCEDURE — 1123F ACP DISCUSS/DSCN MKR DOCD: CPT

## 2024-02-05 PROCEDURE — 3017F COLORECTAL CA SCREEN DOC REV: CPT

## 2024-02-05 PROCEDURE — G8417 CALC BMI ABV UP PARAM F/U: HCPCS

## 2024-02-05 PROCEDURE — 99212 OFFICE O/P EST SF 10 MIN: CPT

## 2024-02-05 PROCEDURE — G8484 FLU IMMUNIZE NO ADMIN: HCPCS

## 2024-02-05 RX ORDER — MELATONIN
1000 DAILY
Qty: 90 TABLET | Refills: 1 | Status: SHIPPED | OUTPATIENT
Start: 2024-02-05

## 2024-02-05 RX ORDER — DIAPER,BRIEF,INFANT-TODD,DISP
EACH MISCELLANEOUS
Qty: 28 G | Refills: 0 | Status: CANCELLED | OUTPATIENT
Start: 2024-02-05

## 2024-02-05 RX ORDER — ATORVASTATIN CALCIUM 40 MG/1
40 TABLET, FILM COATED ORAL NIGHTLY
Qty: 90 TABLET | Refills: 1 | Status: SHIPPED | OUTPATIENT
Start: 2024-02-05

## 2024-02-05 RX ORDER — TAMSULOSIN HYDROCHLORIDE 0.4 MG/1
0.4 CAPSULE ORAL DAILY
Qty: 90 CAPSULE | Refills: 1 | Status: SHIPPED | OUTPATIENT
Start: 2024-02-05

## 2024-02-05 RX ORDER — MECLIZINE HYDROCHLORIDE 25 MG/1
25 TABLET ORAL 2 TIMES DAILY PRN
Qty: 60 TABLET | Refills: 0 | Status: SHIPPED | OUTPATIENT
Start: 2024-02-05

## 2024-02-05 RX ORDER — DILTIAZEM HYDROCHLORIDE 120 MG/1
120 CAPSULE, COATED, EXTENDED RELEASE ORAL DAILY
Qty: 90 CAPSULE | Refills: 1 | Status: SHIPPED | OUTPATIENT
Start: 2024-02-05

## 2024-02-05 ASSESSMENT — ENCOUNTER SYMPTOMS
BACK PAIN: 0
CONSTIPATION: 0
COUGH: 0
VOMITING: 0
DIARRHEA: 0
NAUSEA: 0
ABDOMINAL PAIN: 0
BLOOD IN STOOL: 0
SHORTNESS OF BREATH: 0

## 2024-02-05 NOTE — PATIENT INSTRUCTIONS
Dear Ángel Adams,        Thank you for coming to your appointment today. I hope we have addressed all of your needs.       Please make sure to do the following:  - Continue your medications as listed.  - Bring all your medication with you in your next appointment.   - Get labs drawn before our next follow up.  - Monitor  your blood pressure at home and maintain a diary, bring that diary with  you in  your next appointment.   - Monitor  your blood sugar at home and maintain a diary, bring that diary with  you in  your next appointment.   - We will see each other again in 6/5/24.    Call for a sooner appointment or visit to emergency department if your symptoms gets worse.      Have a great day!        Sincerely,  Janett Bolanos M.D  2/5/2024  10:32 AM

## 2024-02-05 NOTE — PROGRESS NOTES
[facility]  Internal Medicine Residency Clinic  Attending Physician Statement:  Ángel Kearns M.D., F.A.C.P.  Patient is seen for fu visit today.  -- acute and chronic problems addressed  I have seen/discussed the case, including pertinent history and exam findings with the resident, review of last visit medical records/labs/imaging if applicable-     Addressed when applicable- Health maintenance issues of vaccinations, social determinants/depression/CA screening, tobacco cessation etc...    I agree with the assessment, plan and orders as documented by the resident.    -Billiing assessed by medical complexity of case  My assessment of high points of today's visit as follows:      Last visit:  DEC  DM 2--previously Controlled on Jardiance  PAF-- also EF 63% no chf currently              -Continues on Eliquis and diltiazem  Chronic lumbar pain with sciatica from bilateral severe foraminal stenosis  Follows with Doctor's pain clinic   On chronic opiods- +sennokot  Flank pain not assoc with urination or BM  But bc of  months of R flank pain- radiating down to groin  He stopped jardiance- no change  Flomax stopped, slight change  Check UA  If need CT rule out renal/kidney stone/SI-  Also  fu pain management- noted DDD/SI   Obesity class 3 BMI 42   Screening--LDCT -- needs to be scheduled, ordered in 9/2023              AAA screen -- completed 7/2023 and negative

## 2024-02-05 NOTE — PROGRESS NOTES
Ángel Adams (:  1950) is a 73 y.o. male,Established patient, here for evaluation of the following chief complaint(s):  Lower Back Pain (Pt complains of lower right side back pain )         ASSESSMENT/PLAN:  1. Flank pain  -     POCT Urinalysis no Micro  2. Chronic atrial fibrillation (HCC)  -     apixaban (ELIQUIS) 5 MG TABS tablet; Take 1 tablet by mouth 2 times daily Take 1 tablet by mouth 2 times daily, Disp-180 tablet, R-1Normal  -     dilTIAZem (CARDIZEM CD) 120 MG extended release capsule; Take 1 capsule by mouth daily, Disp-90 capsule, R-1Normal  3. Hyperlipidemia, unspecified hyperlipidemia type  -     atorvastatin (LIPITOR) 40 MG tablet; Take 1 tablet by mouth nightly, Disp-90 tablet, R-1Normal  4. TIA (transient ischemic attack)  -     meclizine (ANTIVERT) 25 MG tablet; Take 1 tablet by mouth 2 times daily as needed for Dizziness, Disp-60 tablet, R-0Normal  5. Benign prostatic hyperplasia with nocturia  -     tamsulosin (FLOMAX) 0.4 MG capsule; Take 1 capsule by mouth daily, Disp-90 capsule, R-1Normal  6. Vitamin D deficiency  -     vitamin D3 (CHOLECALCIFEROL) 25 MCG (1000 UT) TABS tablet; Take 1 tablet by mouth daily, Disp-90 tablet, R-1Normal  7. Kidney stone  -     Urinalysis with Microscopic; Future  -     CT ABDOMEN PELVIS WO CONTRAST Additional Contrast? None; Future  8. Obesity, Class III, BMI 40-49.9 (morbid obesity) (HCC)  9. Coagulopathy (HCC)      Return in about 4 months (around 2024) for pcp follow up.         Subjective   SUBJECTIVE/OBJECTIVE:  HPI    Mr. Adams 74 yo male presents today for right sided flank and lower abdominal pain which has been going on since last 6 weeks. Patient states that he thought his medication for DM, and BPH is causing his pain, he stopped taking both medications, but didn't help. Then he tried taking yogurt, and almond milk, but his abdominal and flank pain remains same. His flank pain is not associated with fever, chills, nausea, vomiting. His

## 2024-02-06 ENCOUNTER — TELEPHONE (OUTPATIENT)
Dept: CASE MANAGEMENT | Age: 74
End: 2024-02-06

## 2024-02-06 LAB
DIABETIC RETINOPATHY: NEGATIVE
DIABETIC RETINOPATHY: POSITIVE

## 2024-02-06 NOTE — TELEPHONE ENCOUNTER
No call, encounter opened to process CT Lung Screening.    CT Lung Screen: 2/5/2024      IMPRESSION:  1. There is no pulmonary infiltrate, mass or suspicious pulmonary nodule  2.  LUNG RADS:  Lung-RADS 1 - Negative (v2022)     Management:  12 month screening LDCT     RECOMMENDATIONS:  If you would like to register your patient with the [facility] Lung Nodule/Lung  Cancer Screening Program, please contact the Nurse Navigator at  [phone].    Pack years: 30.1    Social History     Tobacco Use  Smoking Status: Current Every Day Smoker    Start Date: 2004   Quit Date:    Types: Cigarettes   Packs/Day: 1.5   Years: 20.1   Pack Years: 30.1   Smokeless Tobacco: Never         Results letter sent to patient via my chart or mailed.     Davey Romero  Imaging Navigator   [facility]   [phone]

## 2024-03-14 ENCOUNTER — OFFICE VISIT (OUTPATIENT)
Dept: INTERNAL MEDICINE | Age: 74
End: 2024-03-14
Payer: MEDICARE

## 2024-03-14 VITALS
WEIGHT: 315 LBS | HEART RATE: 62 BPM | DIASTOLIC BLOOD PRESSURE: 78 MMHG | BODY MASS INDEX: 40.43 KG/M2 | HEIGHT: 74 IN | OXYGEN SATURATION: 95 % | RESPIRATION RATE: 18 BRPM | TEMPERATURE: 97.1 F | SYSTOLIC BLOOD PRESSURE: 127 MMHG

## 2024-03-14 DIAGNOSIS — E66.9 DIABETES MELLITUS TYPE 2 IN OBESE (HCC): Chronic | ICD-10-CM

## 2024-03-14 DIAGNOSIS — E11.69 DIABETES MELLITUS TYPE 2 IN OBESE (HCC): Chronic | ICD-10-CM

## 2024-03-14 DIAGNOSIS — G45.9 TIA (TRANSIENT ISCHEMIC ATTACK): Primary | ICD-10-CM

## 2024-03-14 DIAGNOSIS — E51.9 THIAMINE DEFICIENCY, UNSPECIFIED: ICD-10-CM

## 2024-03-14 PROCEDURE — G8484 FLU IMMUNIZE NO ADMIN: HCPCS

## 2024-03-14 PROCEDURE — 99212 OFFICE O/P EST SF 10 MIN: CPT

## 2024-03-14 PROCEDURE — G8427 DOCREV CUR MEDS BY ELIG CLIN: HCPCS

## 2024-03-14 PROCEDURE — 3074F SYST BP LT 130 MM HG: CPT

## 2024-03-14 PROCEDURE — 4004F PT TOBACCO SCREEN RCVD TLK: CPT

## 2024-03-14 PROCEDURE — G8417 CALC BMI ABV UP PARAM F/U: HCPCS

## 2024-03-14 PROCEDURE — 99214 OFFICE O/P EST MOD 30 MIN: CPT

## 2024-03-14 PROCEDURE — 3078F DIAST BP <80 MM HG: CPT

## 2024-03-14 PROCEDURE — 3046F HEMOGLOBIN A1C LEVEL >9.0%: CPT

## 2024-03-14 PROCEDURE — 3017F COLORECTAL CA SCREEN DOC REV: CPT

## 2024-03-14 PROCEDURE — 1123F ACP DISCUSS/DSCN MKR DOCD: CPT

## 2024-03-14 PROCEDURE — 2022F DILAT RTA XM EVC RTNOPTHY: CPT

## 2024-03-14 RX ORDER — OXYCODONE HYDROCHLORIDE 10 MG/1
10 TABLET ORAL
COMMUNITY
Start: 2024-03-08

## 2024-03-14 RX ORDER — GLUCOSAMINE HCL/CHONDROITIN SU 500-400 MG
CAPSULE ORAL
Qty: 100 STRIP | Refills: 1 | Status: SHIPPED | OUTPATIENT
Start: 2024-03-14

## 2024-03-14 RX ORDER — THIAMINE MONONITRATE (VIT B1) 100 MG
100 TABLET ORAL DAILY
Qty: 90 TABLET | Refills: 1 | Status: SHIPPED | OUTPATIENT
Start: 2024-03-14

## 2024-03-14 NOTE — PATIENT INSTRUCTIONS
Dear Ángel Adams,        Thank you for coming to your appointment today. I hope we have addressed all of your needs.       Please make sure to do the following:  - Continue your medications as listed.  - Bring all your medication with you in your next appointment.   - Get labs drawn before our next follow up.  - Monitor  your blood pressure at home and maintain a diary, bring that diary with  you in  your next appointment.   - Monitor  your blood sugar at home and maintain a diary, bring that diary with  you in  your next appointment.   - We will see each other again in 6/14/24    Call for a sooner appointment or visit to emergency department if your symptoms gets worse.      Have a great day!        Sincerely,  Janett Bolanos M.D  3/14/2024  10:00 AM

## 2024-03-14 NOTE — PROGRESS NOTES
[facility]  Internal Medicine Residency Clinic    Attending Physician Statement  I have discussed the case, including pertinent history and exam findings with the resident physician.  I agree with the assessment, plan and orders as documented by the resident. I have reviewed the relevant PMHx, PSHx, FamHx, SocialHx, medications, and allergies and updated history as appropriate.    Patient presents for routine follow up of medical problems.     Left eye hemianopsia found in February 2024 eye examination by ophthalmologist on eliquis and ASA, pending medical record, does not recall any incident other than a short period of blindness kind of feeling in last year's [place] time probably due to localized stoke, need MRI of brain with or without contrasts and follow up.  Continue current medications including Eliquis.  If positive for CVA, needs work up for the origin of stoke.    Remainder of medical problems as per resident note.    Perry Marrufo MD  3/14/2024 9:53 AM    
General: Normal range of motion.      Cervical back: Normal range of motion.   Skin:     General: Skin is warm and dry.   Neurological:      General: No focal deficit present.      Mental Status: He is alert and oriented to person, place, and time. Mental status is at baseline.      Cranial Nerves: No cranial nerve deficit.      Sensory: No sensory deficit.      Motor: No weakness.      Coordination: Coordination normal.      Gait: Gait normal.      Deep Tendon Reflexes: Reflexes normal.   Psychiatric:         Behavior: Behavior normal.         Thought Content: Thought content normal.         Judgment: Judgment normal.            On this date 3/14/2024 I have spent 25 minutes reviewing previous notes, test results and face to face with the patient discussing the diagnosis and importance of compliance with the treatment plan as well as documenting on the day of the visit.      An electronic signature was used to authenticate this note.    --Janett Bolanos MD

## 2024-03-15 ENCOUNTER — TELEPHONE (OUTPATIENT)
Dept: INTERNAL MEDICINE | Age: 74
End: 2024-03-15

## 2024-03-15 DIAGNOSIS — G45.9 TIA (TRANSIENT ISCHEMIC ATTACK): Primary | ICD-10-CM

## 2024-03-15 NOTE — TELEPHONE ENCOUNTER
Left message for patient with an appointment reminder for MRI Brain w/wo Contrast on 4/1/24. Patient advised no jewelry, no metal, no piercings. Instructions and directions via voicemail.

## 2024-04-01 ENCOUNTER — HOSPITAL ENCOUNTER (OUTPATIENT)
Age: 74
Discharge: HOME | End: 2024-04-01
Payer: MEDICARE

## 2024-04-01 ENCOUNTER — HOSPITAL ENCOUNTER (OUTPATIENT)
Dept: MRI IMAGING | Age: 74
Discharge: HOME | End: 2024-04-03
Attending: INTERNAL MEDICINE
Payer: MEDICARE

## 2024-04-01 DIAGNOSIS — G45.9 TIA (TRANSIENT ISCHEMIC ATTACK): ICD-10-CM

## 2024-04-01 LAB
ALBUMIN SERPL-MCNC: 3.8 G/DL (ref 3.5–5.2)
ALP SERPL-CCNC: 74 U/L (ref 40–129)
ALT SERPL-CCNC: 12 U/L (ref 0–40)
ANION GAP SERPL CALCULATED.3IONS-SCNC: 12 MMOL/L (ref 7–16)
AST SERPL-CCNC: 13 U/L (ref 0–39)
BILIRUB SERPL-MCNC: 0.4 MG/DL (ref 0–1.2)
BUN SERPL-MCNC: 15 MG/DL (ref 6–23)
CALCIUM SERPL-MCNC: 8.9 MG/DL (ref 8.6–10.2)
CHLORIDE SERPL-SCNC: 103 MMOL/L (ref 98–107)
CO2 SERPL-SCNC: 23 MMOL/L (ref 22–29)
CREAT SERPL-MCNC: 1 MG/DL (ref 0.7–1.2)
GFR SERPL CREATININE-BSD FRML MDRD: 80 ML/MIN/1.73M2
GLUCOSE SERPL-MCNC: 155 MG/DL (ref 74–99)
POTASSIUM SERPL-SCNC: 4.4 MMOL/L (ref 3.5–5)
PROT SERPL-MCNC: 7.4 G/DL (ref 6.4–8.3)
SODIUM SERPL-SCNC: 138 MMOL/L (ref 132–146)

## 2024-04-01 PROCEDURE — 6360000004 HC RX CONTRAST MEDICATION: Performed by: RADIOLOGY

## 2024-04-01 PROCEDURE — 80053 COMPREHEN METABOLIC PANEL: CPT

## 2024-04-01 PROCEDURE — 36415 COLL VENOUS BLD VENIPUNCTURE: CPT

## 2024-04-01 PROCEDURE — 70553 MRI BRAIN STEM W/O & W/DYE: CPT

## 2024-04-01 PROCEDURE — A9579 GAD-BASE MR CONTRAST NOS,1ML: HCPCS | Performed by: RADIOLOGY

## 2024-04-01 RX ADMIN — GADOTERIDOL 20 ML: 279.3 INJECTION, SOLUTION INTRAVENOUS at 11:55

## 2024-05-13 ENCOUNTER — OFFICE VISIT (OUTPATIENT)
Dept: INTERNAL MEDICINE | Age: 74
End: 2024-05-13
Payer: MEDICARE

## 2024-05-13 VITALS
HEART RATE: 70 BPM | HEIGHT: 74 IN | SYSTOLIC BLOOD PRESSURE: 120 MMHG | RESPIRATION RATE: 18 BRPM | OXYGEN SATURATION: 94 % | BODY MASS INDEX: 40.43 KG/M2 | DIASTOLIC BLOOD PRESSURE: 58 MMHG | TEMPERATURE: 97.8 F | WEIGHT: 315 LBS

## 2024-05-13 DIAGNOSIS — E11.9 TYPE 2 DIABETES MELLITUS WITHOUT COMPLICATION, WITHOUT LONG-TERM CURRENT USE OF INSULIN (HCC): ICD-10-CM

## 2024-05-13 DIAGNOSIS — K21.9 GASTROESOPHAGEAL REFLUX DISEASE, UNSPECIFIED WHETHER ESOPHAGITIS PRESENT: ICD-10-CM

## 2024-05-13 DIAGNOSIS — M54.12 CERVICAL RADICULITIS: Primary | ICD-10-CM

## 2024-05-13 LAB
CREATININE URINE POCT: ABNORMAL
MICROALBUMIN/CREAT 24H UR: ABNORMAL MG/G{CREAT}
MICROALBUMIN/CREAT UR-RTO: ABNORMAL

## 2024-05-13 PROCEDURE — 4004F PT TOBACCO SCREEN RCVD TLK: CPT

## 2024-05-13 PROCEDURE — 2022F DILAT RTA XM EVC RTNOPTHY: CPT

## 2024-05-13 PROCEDURE — G8427 DOCREV CUR MEDS BY ELIG CLIN: HCPCS

## 2024-05-13 PROCEDURE — 82044 UR ALBUMIN SEMIQUANTITATIVE: CPT

## 2024-05-13 PROCEDURE — G8417 CALC BMI ABV UP PARAM F/U: HCPCS

## 2024-05-13 PROCEDURE — 3074F SYST BP LT 130 MM HG: CPT

## 2024-05-13 PROCEDURE — 3046F HEMOGLOBIN A1C LEVEL >9.0%: CPT

## 2024-05-13 PROCEDURE — 3017F COLORECTAL CA SCREEN DOC REV: CPT

## 2024-05-13 PROCEDURE — 1123F ACP DISCUSS/DSCN MKR DOCD: CPT

## 2024-05-13 PROCEDURE — 3078F DIAST BP <80 MM HG: CPT

## 2024-05-13 PROCEDURE — 99213 OFFICE O/P EST LOW 20 MIN: CPT

## 2024-05-13 RX ORDER — PANTOPRAZOLE SODIUM 20 MG/1
20 TABLET, DELAYED RELEASE ORAL
Qty: 30 TABLET | Refills: 0 | Status: SHIPPED | OUTPATIENT
Start: 2024-05-13

## 2024-05-13 RX ORDER — CHLORAL HYDRATE 500 MG
CAPSULE ORAL DAILY
COMMUNITY

## 2024-05-13 ASSESSMENT — PATIENT HEALTH QUESTIONNAIRE - PHQ9
4. FEELING TIRED OR HAVING LITTLE ENERGY: MORE THAN HALF THE DAYS
7. TROUBLE CONCENTRATING ON THINGS, SUCH AS READING THE NEWSPAPER OR WATCHING TELEVISION: NOT AT ALL
8. MOVING OR SPEAKING SO SLOWLY THAT OTHER PEOPLE COULD HAVE NOTICED. OR THE OPPOSITE, BEING SO FIGETY OR RESTLESS THAT YOU HAVE BEEN MOVING AROUND A LOT MORE THAN USUAL: NOT AT ALL
SUM OF ALL RESPONSES TO PHQ9 QUESTIONS 1 & 2: 0
2. FEELING DOWN, DEPRESSED OR HOPELESS: NOT AT ALL
5. POOR APPETITE OR OVEREATING: SEVERAL DAYS
SUM OF ALL RESPONSES TO PHQ QUESTIONS 1-9: 4
3. TROUBLE FALLING OR STAYING ASLEEP: SEVERAL DAYS
6. FEELING BAD ABOUT YOURSELF - OR THAT YOU ARE A FAILURE OR HAVE LET YOURSELF OR YOUR FAMILY DOWN: NOT AT ALL
SUM OF ALL RESPONSES TO PHQ QUESTIONS 1-9: 4
SUM OF ALL RESPONSES TO PHQ QUESTIONS 1-9: 4
10. IF YOU CHECKED OFF ANY PROBLEMS, HOW DIFFICULT HAVE THESE PROBLEMS MADE IT FOR YOU TO DO YOUR WORK, TAKE CARE OF THINGS AT HOME, OR GET ALONG WITH OTHER PEOPLE: NOT DIFFICULT AT ALL
1. LITTLE INTEREST OR PLEASURE IN DOING THINGS: NOT AT ALL
9. THOUGHTS THAT YOU WOULD BE BETTER OFF DEAD, OR OF HURTING YOURSELF: NOT AT ALL
SUM OF ALL RESPONSES TO PHQ QUESTIONS 1-9: 4

## 2024-05-13 NOTE — PATIENT INSTRUCTIONS
Dear Ángel Adams,        Thank you for coming to your appointment today. I hope we have addressed all of your needs.       Please make sure to do the following:  - Continue your medications as listed.  - Get labs drawn before our next follow up. We will call you with the results     - We will see each other again in 6 months     Call for a sooner appointment if you have any concerns.     Have a great day!    [facility]   [phone]     Sincerely,  Kayleigh Mullins MD  5/13/2024  11:39 AM

## 2024-05-13 NOTE — PROGRESS NOTES
[facility]  Internal Medicine Residency Clinic    Attending Physician Statement  I have discussed the case, including pertinent history and exam findings with the resident physician.  I agree with the assessment, plan and orders as documented by the resident. I have reviewed all pertinent PMHx, PSHx, FamHx, SocialHx, medications, and allergies and updated history as appropriate.    Patient here for routine follow up of medical problems.     Left Shoulder Pain  -with positive spurling sign and pain with hawkings test  -concern for radiculopathy; Xray ordered; PT/OT and MRI if no improvement   -topical and oral pain control     NIDDM2  -controlled; The current medical regimen is effective;  continue present plan and medications.  -screening labs and testing     GERD  -tiral PPI for patient     Remainder of medical problems as per resident note.    Naren Perkins Jr, DO  5/13/24    
Future    Gastroesophageal reflux disease, unspecified whether esophagitis present  -     pantoprazole (PROTONIX) 20 MG tablet; Take 1 tablet by mouth every morning (before breakfast)    Type 2 diabetes mellitus without complication, without long-term current use of insulin (HCC)  -     POCT microalbumin        I have reviewed my findings and recommendations with Ángel Adams and Dr Gregorio Mullins MD PGY-1  5/14/2024 2:12 PM

## 2024-07-29 DIAGNOSIS — R35.1 BENIGN PROSTATIC HYPERPLASIA WITH NOCTURIA: ICD-10-CM

## 2024-07-29 DIAGNOSIS — N40.1 BENIGN PROSTATIC HYPERPLASIA WITH NOCTURIA: ICD-10-CM

## 2024-07-29 DIAGNOSIS — I48.20 CHRONIC ATRIAL FIBRILLATION (HCC): ICD-10-CM

## 2024-07-31 RX ORDER — TAMSULOSIN HYDROCHLORIDE 0.4 MG/1
0.4 CAPSULE ORAL DAILY
Qty: 90 CAPSULE | Refills: 1 | Status: SHIPPED | OUTPATIENT
Start: 2024-07-31

## 2024-07-31 RX ORDER — DILTIAZEM HYDROCHLORIDE 120 MG/1
120 CAPSULE, COATED, EXTENDED RELEASE ORAL DAILY
Qty: 90 CAPSULE | Refills: 1 | Status: SHIPPED | OUTPATIENT
Start: 2024-07-31

## 2024-09-04 DIAGNOSIS — I48.20 CHRONIC ATRIAL FIBRILLATION (HCC): ICD-10-CM

## 2024-09-04 RX ORDER — DILTIAZEM HYDROCHLORIDE 120 MG/1
120 CAPSULE, COATED, EXTENDED RELEASE ORAL DAILY
Qty: 90 CAPSULE | Refills: 0 | OUTPATIENT
Start: 2024-09-04

## 2024-09-04 NOTE — TELEPHONE ENCOUNTER
Last Appointment:  5/13/2024  No future appointments.     Diltiazem filled 7/31/24 #90 one refill = 180 days = 1/27/25  Not due back until November  Message left on voicemail

## 2024-09-25 PROBLEM — E11.9 DIABETES MELLITUS WITHOUT COMPLICATION (HCC): Status: ACTIVE | Noted: 2022-01-20

## 2024-09-25 PROBLEM — H40.001: Status: ACTIVE | Noted: 2022-05-19

## 2024-09-25 PROBLEM — E11.51 DIABETIC PERIPHERAL ANGIOPATHY (HCC): Status: ACTIVE | Noted: 2023-10-26

## 2024-09-25 PROBLEM — H53.469 HOMONYMOUS HEMIANOPIA: Status: ACTIVE | Noted: 2022-01-20

## 2024-09-25 PROBLEM — B35.1 ONYCHOMYCOSIS: Status: ACTIVE | Noted: 2023-10-26

## 2024-09-25 PROBLEM — H40.1290 LOW TENSION GLAUCOMA: Status: ACTIVE | Noted: 2022-11-16

## 2024-09-25 PROBLEM — E11.49 TYPE II DIABETES MELLITUS WITH NEUROLOGICAL MANIFESTATIONS (HCC): Status: ACTIVE | Noted: 2023-10-26

## 2024-09-25 PROBLEM — H25.10 NUCLEAR SENILE CATARACT: Status: ACTIVE | Noted: 2022-01-20

## 2024-09-27 ENCOUNTER — OFFICE VISIT (OUTPATIENT)
Dept: INTERNAL MEDICINE | Age: 74
End: 2024-09-27
Payer: MEDICARE

## 2024-09-27 VITALS
TEMPERATURE: 97.9 F | RESPIRATION RATE: 20 BRPM | DIASTOLIC BLOOD PRESSURE: 69 MMHG | HEIGHT: 74 IN | OXYGEN SATURATION: 96 % | BODY MASS INDEX: 40.43 KG/M2 | SYSTOLIC BLOOD PRESSURE: 122 MMHG | HEART RATE: 93 BPM | WEIGHT: 315 LBS

## 2024-09-27 DIAGNOSIS — E11.69 TYPE 2 DIABETES MELLITUS WITH OBESITY (HCC): Chronic | ICD-10-CM

## 2024-09-27 DIAGNOSIS — E11.51 DIABETIC PERIPHERAL ANGIOPATHY (HCC): ICD-10-CM

## 2024-09-27 DIAGNOSIS — I48.20 CHRONIC ATRIAL FIBRILLATION (HCC): ICD-10-CM

## 2024-09-27 DIAGNOSIS — N40.1 BENIGN PROSTATIC HYPERPLASIA WITH NOCTURIA: ICD-10-CM

## 2024-09-27 DIAGNOSIS — F31.9 BIPOLAR I DISORDER WITH MIXED FEATURES (HCC): ICD-10-CM

## 2024-09-27 DIAGNOSIS — E66.9 TYPE 2 DIABETES MELLITUS WITH OBESITY (HCC): Chronic | ICD-10-CM

## 2024-09-27 DIAGNOSIS — E66.01 CLASS 3 SEVERE OBESITY DUE TO EXCESS CALORIES WITH SERIOUS COMORBIDITY AND BODY MASS INDEX (BMI) OF 40.0 TO 44.9 IN ADULT: ICD-10-CM

## 2024-09-27 DIAGNOSIS — R35.1 BENIGN PROSTATIC HYPERPLASIA WITH NOCTURIA: ICD-10-CM

## 2024-09-27 DIAGNOSIS — F31.2 BIPOLAR AFFECTIVE DISORDER, CURRENTLY MANIC, SEVERE, WITH PSYCHOTIC FEATURES (HCC): ICD-10-CM

## 2024-09-27 DIAGNOSIS — E11.49 TYPE II DIABETES MELLITUS WITH NEUROLOGICAL MANIFESTATIONS (HCC): Primary | ICD-10-CM

## 2024-09-27 DIAGNOSIS — E51.9 THIAMINE DEFICIENCY, UNSPECIFIED: ICD-10-CM

## 2024-09-27 DIAGNOSIS — Z23 NEED FOR SHINGLES VACCINE: ICD-10-CM

## 2024-09-27 DIAGNOSIS — K21.9 GASTROESOPHAGEAL REFLUX DISEASE, UNSPECIFIED WHETHER ESOPHAGITIS PRESENT: ICD-10-CM

## 2024-09-27 DIAGNOSIS — E78.5 HYPERLIPIDEMIA, UNSPECIFIED HYPERLIPIDEMIA TYPE: ICD-10-CM

## 2024-09-27 LAB — HBA1C MFR BLD: 6.3 %

## 2024-09-27 PROCEDURE — 90746 HEPB VACCINE 3 DOSE ADULT IM: CPT | Performed by: INTERNAL MEDICINE

## 2024-09-27 PROCEDURE — 83036 HEMOGLOBIN GLYCOSYLATED A1C: CPT

## 2024-09-27 PROCEDURE — 99214 OFFICE O/P EST MOD 30 MIN: CPT

## 2024-09-27 RX ORDER — ATORVASTATIN CALCIUM 40 MG/1
40 TABLET, FILM COATED ORAL NIGHTLY
Qty: 90 TABLET | Refills: 1 | Status: SHIPPED | OUTPATIENT
Start: 2024-09-27

## 2024-09-27 RX ORDER — TAMSULOSIN HYDROCHLORIDE 0.4 MG/1
0.4 CAPSULE ORAL DAILY
Qty: 90 CAPSULE | Refills: 1 | Status: SHIPPED | OUTPATIENT
Start: 2024-09-27

## 2024-09-27 RX ORDER — ZOSTER VACCINE RECOMBINANT, ADJUVANTED 50 MCG/0.5
0.5 KIT INTRAMUSCULAR SEE ADMIN INSTRUCTIONS
Qty: 0.5 ML | Refills: 0 | Status: SHIPPED | OUTPATIENT
Start: 2024-09-27 | End: 2025-03-26

## 2024-09-27 RX ORDER — PANTOPRAZOLE SODIUM 20 MG/1
20 TABLET, DELAYED RELEASE ORAL
Qty: 90 TABLET | Refills: 1 | Status: SHIPPED | OUTPATIENT
Start: 2024-09-27

## 2024-09-27 RX ORDER — THIAMINE MONONITRATE (VIT B1) 100 MG
100 TABLET ORAL DAILY
Qty: 90 TABLET | Refills: 1 | Status: SHIPPED | OUTPATIENT
Start: 2024-09-27

## 2024-09-27 RX ORDER — DILTIAZEM HYDROCHLORIDE 120 MG/1
120 CAPSULE, COATED, EXTENDED RELEASE ORAL DAILY
Qty: 90 CAPSULE | Refills: 1 | Status: SHIPPED | OUTPATIENT
Start: 2024-09-27

## 2024-09-27 RX ORDER — DIGOXIN 125 MCG
1 TABLET ORAL DAILY
COMMUNITY
Start: 2024-08-06

## 2024-09-27 RX ORDER — DIGOXIN 125 MCG
125 TABLET ORAL DAILY
Qty: 90 TABLET | Refills: 2 | Status: CANCELLED | OUTPATIENT
Start: 2024-09-27

## 2024-09-27 RX ORDER — CHLORAL HYDRATE 500 MG
1000 CAPSULE ORAL DAILY
Qty: 90 CAPSULE | Refills: 1 | Status: SHIPPED | OUTPATIENT
Start: 2024-09-27

## 2024-09-27 RX ORDER — GLUCOSAMINE HCL/CHONDROITIN SU 500-400 MG
1 CAPSULE ORAL 2 TIMES DAILY
Qty: 100 STRIP | Refills: 11 | Status: SHIPPED | OUTPATIENT
Start: 2024-09-27

## 2024-12-03 ENCOUNTER — OFFICE VISIT (OUTPATIENT)
Dept: BARIATRICS/WEIGHT MGMT | Age: 74
End: 2024-12-03
Payer: MEDICARE

## 2024-12-03 VITALS
TEMPERATURE: 97 F | HEART RATE: 82 BPM | WEIGHT: 315 LBS | HEIGHT: 70 IN | BODY MASS INDEX: 45.1 KG/M2 | SYSTOLIC BLOOD PRESSURE: 148 MMHG | DIASTOLIC BLOOD PRESSURE: 66 MMHG

## 2024-12-03 DIAGNOSIS — E66.01 CLASS 3 SEVERE OBESITY DUE TO EXCESS CALORIES WITH SERIOUS COMORBIDITY AND BODY MASS INDEX (BMI) OF 40.0 TO 44.9 IN ADULT: ICD-10-CM

## 2024-12-03 DIAGNOSIS — E66.813 CLASS 3 SEVERE OBESITY DUE TO EXCESS CALORIES WITH SERIOUS COMORBIDITY AND BODY MASS INDEX (BMI) OF 40.0 TO 44.9 IN ADULT: ICD-10-CM

## 2024-12-03 DIAGNOSIS — E66.9 TYPE 2 DIABETES MELLITUS WITH OBESITY (HCC): Primary | ICD-10-CM

## 2024-12-03 DIAGNOSIS — E11.69 TYPE 2 DIABETES MELLITUS WITH OBESITY (HCC): Primary | ICD-10-CM

## 2024-12-03 PROCEDURE — 99202 OFFICE O/P NEW SF 15 MIN: CPT | Performed by: INTERNAL MEDICINE

## 2024-12-03 RX ORDER — SEMAGLUTIDE 0.68 MG/ML
0.5 INJECTION, SOLUTION SUBCUTANEOUS WEEKLY
Qty: 3 ML | Refills: 2 | Status: SHIPPED | OUTPATIENT
Start: 2024-12-03

## 2024-12-03 RX ORDER — SEMAGLUTIDE 0.68 MG/ML
INJECTION, SOLUTION SUBCUTANEOUS
COMMUNITY
Start: 2024-11-11 | End: 2024-12-03 | Stop reason: DRUGHIGH

## 2024-12-03 NOTE — PROGRESS NOTES
CC -   DM2, Obesity    BACKGROUND -   First visit: 12/3/24    Obesity (all weight in lbs)  Initial Ht 69.75\", Wt 320.8,  BMI 46.36  Began after injury at his job iin 2005 (was started on Oxycontin)  HS Grad wt 198   Lowest   wt 198 (218-220 for long period of time - ntil age of 42)   Highest  wt 392 (back in 2007)  Pattern of wt gain: gradual, has been stable since 2007, infact has been losing some weight  Wt change past yr: ~-25 lbs  Most wt lost: 25 lbs  Other diets attempted: portion control, has not been able to cut down sugar    Initial Diet:    Number of meals per day - 2 (~10 am and 3-7 pm dinner)    Number of snacks per day - 0    Meal volume - 12\" plate,  rarely seconds    Fast food/convenience store - 3x/week    Restaurants (not fast food) - 3x/week   Sweets - 2d/week   Chips - 0-1d/week   Crackers/pretzels - 0d/week   Nuts - 0d/week   Peanut Butter - 0d/week   Popcorn - 0d/week (seldom   Dried fruit - 0d/week   Whole fruit - 2d/week   Breakfast cereal - 2d/week (on for a week and off for 3 week)   Granola/Protein/Energy bar - 0d/week   Sugar sweetened beverages - Pepsi - very little, no fruit juice, no coffee, [place] iced tea 1-2 can/day, no energy drinks; beer eg 2 beers at least twice a week   Protein - No supplements   Fiber - No supplements    Wt effect of HR foods = 2800 Shukri/wk = 400 Shukri/d= 15.9% DEN = 42 lb/year.     Initial Exercise:    Gym membership - Joined Y    Walking - no    Running - no    Resistance - no    Aerobic class - no. Walking is difficult with back pain. Isometric exercise and massotherapy     Sleep: h/o LATISHA.  ______________________    PFSH -  Past Medical History:   Diagnosis Date    Arthritis     Back pain 2005    injured back at work. on disability, CHRONIC , HERNIATED LUMBAR, H/O EPIDURAL INJECTIONS    Cerebral artery occlusion with cerebral infarction (HCC)     6-24-18 - trouble talking, stutters right hand weak     Depression     pt states he s doing well at this time. off

## 2024-12-03 NOTE — PATIENT INSTRUCTIONS
Rules:  Limit sweets to one day per month  Limit chips/crackers/pretzels/nuts/popcorn to 150 shukri/day  Eliminate all sugar sweetened beverages (including fruit juice)  Limit restaurants (including fast food and food from a convenience store) to one time every two weeks while in town    Requirements:  Make sure protein intake is at least 100 grams per day (do not count protein every day; instead spot check your intake every 2-3 weeks and make sure what you think you are getting is close to accurate; consider using a protein shake if needed; these are in the pharmacy section of the stores, not the grocery section; Premier, Pure Protein and Fairlife are relatively inexpensive and taste good to most patients; other options are Nectar, Boost Max, Ensure Max, BeneProtein and GNC lean (which is lactose-free);   Nectar fruit, Premier Protein Clear, IsoPure Protein Drink, and Protein 2 O are water-based options; Quest (or Cosco, which is cheaper and is ordered on Amazon) and the [employer] 1 protein bars can also be used, but have less protein in them ) (<200 Shukri, >25 g protein) - (chicken, fish, turkey, egg white)  (Disclaimer: Dietary supplements rarely have their listed ingredients and the amount of each verified by a third party other. Sometimes they give verification for their claims to be GMO and gluten free and to be organic. However, even such verifications as these may still be untrustworthy.)  Make sure that fiber intake is at least 25 grams per day. Do this in part or whole by taking 14 tablespoons of Fiber one original cereal or Stewart's All Bran Buds cereal, or 5 tablespoons of wheat dextrin powder (Benefiber or generic brand); for both of these, start with 1/8th - 1/4th the target amount and every week add another 1/8th - 1/4th until reaching the target).  Also, fiber gummies containing inulin (such as Nature Mad, Blancas, Benefiber) or Fiber Choice Pre-biotic tablets containing inulin are also an option.1 cup of

## 2024-12-19 NOTE — PROGRESS NOTES
Medicare Annual Wellness Visit    Ángel Adams is here for Medicare AWV, Hypertension, and Diabetes    Assessment & Plan   Medicare annual wellness visit, subsequent  Thiamine deficiency, unspecified  -     vitamin B-1 (THIAMINE) 100 MG tablet; Take 1 tablet by mouth daily, Disp-90 tablet, R-1Normal  Benign prostatic hyperplasia with nocturia  -     tamsulosin (FLOMAX) 0.4 MG capsule; Take 1 capsule by mouth daily, Disp-90 capsule, R-1Normal  -     PSA Screening; Future  Gastroesophageal reflux disease, unspecified whether esophagitis present  -     pantoprazole (PROTONIX) 20 MG tablet; Take 1 tablet by mouth every morning (before breakfast), Disp-90 tablet, R-1Normal  Type 2 diabetes mellitus with obesity (HCC)  -     Omega-3 Fatty Acids (FISH OIL) 1000 MG capsule; Take 1 capsule by mouth daily, Disp-90 capsule, R-1Normal  -     empagliflozin (JARDIANCE) 25 MG tablet; Take 1 tablet by mouth daily, Disp-90 tablet, R-1Normal  Chronic atrial fibrillation (HCC)  -     dilTIAZem (CARDIZEM CD) 120 MG extended release capsule; Take 1 capsule by mouth daily, Disp-90 capsule, R-1Normal  -     apixaban (ELIQUIS) 5 MG TABS tablet; Take 1 tablet by mouth 2 times daily Take 1 tablet by mouth 2 times daily, Disp-180 tablet, R-1Normal  Hyperlipidemia, unspecified hyperlipidemia type  -     atorvastatin (LIPITOR) 40 MG tablet; Take 1 tablet by mouth nightly, Disp-90 tablet, R-1Normal  -     Lipid Panel; Future  Encounter for screening for malignant neoplasm of prostate  -     PSA Screening; Future  History of stroke  Bilateral impacted cerumen  -     carbamide peroxide (DEBROX) 6.5 % otic solution; Place 5 drops into both ears 2 times daily, Disp-1 each, R-2Normal    Recommendations for Preventive Services Due: see orders and patient instructions/AVS.  Recommended screening schedule for the next 5-10 years is provided to the patient in written form: see Patient Instructions/AVS.     Return in 3 months (on 3/20/2025) for Medicare

## 2024-12-20 ENCOUNTER — OFFICE VISIT (OUTPATIENT)
Dept: INTERNAL MEDICINE | Age: 74
End: 2024-12-20
Payer: MEDICARE

## 2024-12-20 VITALS
HEART RATE: 90 BPM | OXYGEN SATURATION: 97 % | WEIGHT: 315 LBS | BODY MASS INDEX: 41.75 KG/M2 | DIASTOLIC BLOOD PRESSURE: 72 MMHG | TEMPERATURE: 96.8 F | SYSTOLIC BLOOD PRESSURE: 135 MMHG | RESPIRATION RATE: 18 BRPM | HEIGHT: 73 IN

## 2024-12-20 DIAGNOSIS — I48.20 CHRONIC ATRIAL FIBRILLATION (HCC): ICD-10-CM

## 2024-12-20 DIAGNOSIS — E66.9 TYPE 2 DIABETES MELLITUS WITH OBESITY (HCC): Chronic | ICD-10-CM

## 2024-12-20 DIAGNOSIS — Z00.00 MEDICARE ANNUAL WELLNESS VISIT, SUBSEQUENT: Primary | ICD-10-CM

## 2024-12-20 DIAGNOSIS — Z12.5 ENCOUNTER FOR SCREENING FOR MALIGNANT NEOPLASM OF PROSTATE: ICD-10-CM

## 2024-12-20 DIAGNOSIS — E78.5 HYPERLIPIDEMIA, UNSPECIFIED HYPERLIPIDEMIA TYPE: ICD-10-CM

## 2024-12-20 DIAGNOSIS — E51.9 THIAMINE DEFICIENCY, UNSPECIFIED: ICD-10-CM

## 2024-12-20 DIAGNOSIS — H61.23 BILATERAL IMPACTED CERUMEN: ICD-10-CM

## 2024-12-20 DIAGNOSIS — E11.69 TYPE 2 DIABETES MELLITUS WITH OBESITY (HCC): Chronic | ICD-10-CM

## 2024-12-20 DIAGNOSIS — R35.1 BENIGN PROSTATIC HYPERPLASIA WITH NOCTURIA: ICD-10-CM

## 2024-12-20 DIAGNOSIS — K21.9 GASTROESOPHAGEAL REFLUX DISEASE, UNSPECIFIED WHETHER ESOPHAGITIS PRESENT: ICD-10-CM

## 2024-12-20 DIAGNOSIS — N40.1 BENIGN PROSTATIC HYPERPLASIA WITH NOCTURIA: ICD-10-CM

## 2024-12-20 DIAGNOSIS — Z86.73 HISTORY OF STROKE: ICD-10-CM

## 2024-12-20 PROCEDURE — 90653 IIV ADJUVANT VACCINE IM: CPT | Performed by: INTERNAL MEDICINE

## 2024-12-20 PROCEDURE — 99212 OFFICE O/P EST SF 10 MIN: CPT

## 2024-12-20 RX ORDER — TAMSULOSIN HYDROCHLORIDE 0.4 MG/1
0.4 CAPSULE ORAL DAILY
Qty: 90 CAPSULE | Refills: 1 | Status: SHIPPED | OUTPATIENT
Start: 2024-12-20

## 2024-12-20 RX ORDER — ATORVASTATIN CALCIUM 40 MG/1
40 TABLET, FILM COATED ORAL NIGHTLY
Qty: 90 TABLET | Refills: 1 | Status: SHIPPED | OUTPATIENT
Start: 2024-12-20

## 2024-12-20 RX ORDER — DILTIAZEM HYDROCHLORIDE 120 MG/1
120 CAPSULE, COATED, EXTENDED RELEASE ORAL DAILY
Qty: 90 CAPSULE | Refills: 1 | Status: SHIPPED | OUTPATIENT
Start: 2024-12-20

## 2024-12-20 RX ORDER — CHLORAL HYDRATE 500 MG
1000 CAPSULE ORAL DAILY
Qty: 90 CAPSULE | Refills: 1 | Status: SHIPPED | OUTPATIENT
Start: 2024-12-20

## 2024-12-20 RX ORDER — THIAMINE MONONITRATE (VIT B1) 100 MG
100 TABLET ORAL DAILY
Qty: 90 TABLET | Refills: 1 | Status: SHIPPED | OUTPATIENT
Start: 2024-12-20

## 2024-12-20 RX ORDER — PANTOPRAZOLE SODIUM 20 MG/1
20 TABLET, DELAYED RELEASE ORAL
Qty: 90 TABLET | Refills: 1 | Status: SHIPPED | OUTPATIENT
Start: 2024-12-20

## 2024-12-20 SDOH — ECONOMIC STABILITY: INCOME INSECURITY: HOW HARD IS IT FOR YOU TO PAY FOR THE VERY BASICS LIKE FOOD, HOUSING, MEDICAL CARE, AND HEATING?: SOMEWHAT HARD

## 2024-12-20 SDOH — ECONOMIC STABILITY: FOOD INSECURITY: WITHIN THE PAST 12 MONTHS, YOU WORRIED THAT YOUR FOOD WOULD RUN OUT BEFORE YOU GOT MONEY TO BUY MORE.: NEVER TRUE

## 2024-12-20 SDOH — ECONOMIC STABILITY: FOOD INSECURITY: WITHIN THE PAST 12 MONTHS, THE FOOD YOU BOUGHT JUST DIDN'T LAST AND YOU DIDN'T HAVE MONEY TO GET MORE.: NEVER TRUE

## 2024-12-20 ASSESSMENT — PATIENT HEALTH QUESTIONNAIRE - PHQ9
3. TROUBLE FALLING OR STAYING ASLEEP: NEARLY EVERY DAY
6. FEELING BAD ABOUT YOURSELF - OR THAT YOU ARE A FAILURE OR HAVE LET YOURSELF OR YOUR FAMILY DOWN: NOT AT ALL
SUM OF ALL RESPONSES TO PHQ QUESTIONS 1-9: 6
2. FEELING DOWN, DEPRESSED OR HOPELESS: NOT AT ALL
SUM OF ALL RESPONSES TO PHQ QUESTIONS 1-9: 6
10. IF YOU CHECKED OFF ANY PROBLEMS, HOW DIFFICULT HAVE THESE PROBLEMS MADE IT FOR YOU TO DO YOUR WORK, TAKE CARE OF THINGS AT HOME, OR GET ALONG WITH OTHER PEOPLE: SOMEWHAT DIFFICULT
7. TROUBLE CONCENTRATING ON THINGS, SUCH AS READING THE NEWSPAPER OR WATCHING TELEVISION: NOT AT ALL
9. THOUGHTS THAT YOU WOULD BE BETTER OFF DEAD, OR OF HURTING YOURSELF: NOT AT ALL
SUM OF ALL RESPONSES TO PHQ9 QUESTIONS 1 & 2: 0
5. POOR APPETITE OR OVEREATING: NEARLY EVERY DAY
SUM OF ALL RESPONSES TO PHQ QUESTIONS 1-9: 6
1. LITTLE INTEREST OR PLEASURE IN DOING THINGS: NOT AT ALL
4. FEELING TIRED OR HAVING LITTLE ENERGY: NOT AT ALL
SUM OF ALL RESPONSES TO PHQ QUESTIONS 1-9: 6
8. MOVING OR SPEAKING SO SLOWLY THAT OTHER PEOPLE COULD HAVE NOTICED. OR THE OPPOSITE, BEING SO FIGETY OR RESTLESS THAT YOU HAVE BEEN MOVING AROUND A LOT MORE THAN USUAL: NOT AT ALL

## 2024-12-20 ASSESSMENT — LIFESTYLE VARIABLES
HOW OFTEN DO YOU HAVE A DRINK CONTAINING ALCOHOL: 2-4 TIMES A MONTH
HOW MANY STANDARD DRINKS CONTAINING ALCOHOL DO YOU HAVE ON A TYPICAL DAY: 3 OR 4

## 2024-12-20 NOTE — PROGRESS NOTES
[facility]  Internal Medicine Residency Clinic    Attending Physician Statement  I have discussed the case, including pertinent history and exam findings with the resident physician.  I agree with the assessment, plan and orders as documented by the resident. I have reviewed the relevant PMHx, PSHx, FamHx, SocialHx, medications, and allergies and updated history as appropriate.    Patient presents for Medicare Annual Wellness visit and chronic  condition management.    AWV    Mini COG 3/5 (effort limited) denies any memory concerns -- need to follow in future    Chronic lumbar pain -- on opiate therapy with pain management    Hearing issues identified -- cerumen impaction b/l -- recommend debrox    Edentulous -- interested in dentures however insurance not covering so deferred for now    DM 2    Continues on jardiance and Trulicity    Obesity class 3 BMI 43     Follows with medical weight loss clinic    PAF with hx stroke   Continues on eliquis and diltiazem    HLD   Recommend resume Lipitor    Screening:   Check lipid profile, flu shot, PSA    Remainder of medical problems as per resident note.    Aeljandro Medina DO  12/20/2024 11:31 AM

## 2024-12-26 ENCOUNTER — HOSPITAL ENCOUNTER (OUTPATIENT)
Age: 74
Discharge: HOME | End: 2024-12-26
Payer: MEDICARE

## 2024-12-26 DIAGNOSIS — R35.1 BENIGN PROSTATIC HYPERPLASIA WITH NOCTURIA: ICD-10-CM

## 2024-12-26 DIAGNOSIS — Z12.5 ENCOUNTER FOR SCREENING FOR MALIGNANT NEOPLASM OF PROSTATE: ICD-10-CM

## 2024-12-26 DIAGNOSIS — E78.5 HYPERLIPIDEMIA, UNSPECIFIED HYPERLIPIDEMIA TYPE: ICD-10-CM

## 2024-12-26 DIAGNOSIS — N40.1 BENIGN PROSTATIC HYPERPLASIA WITH NOCTURIA: ICD-10-CM

## 2024-12-26 LAB
CHOLEST SERPL-MCNC: 193 MG/DL
HDLC SERPL-MCNC: 55 MG/DL
LDLC SERPL CALC-MCNC: 117 MG/DL
PSA SERPL-MCNC: 0.34 NG/ML (ref 0–4)
TRIGL SERPL-MCNC: 103 MG/DL
VLDLC SERPL CALC-MCNC: 21 MG/DL

## 2024-12-26 PROCEDURE — 80061 LIPID PANEL: CPT

## 2024-12-26 PROCEDURE — 36415 COLL VENOUS BLD VENIPUNCTURE: CPT

## 2024-12-26 PROCEDURE — G0103 PSA SCREENING: HCPCS

## 2025-01-15 ENCOUNTER — TELEPHONE (OUTPATIENT)
Dept: INTERNAL MEDICINE | Age: 75
End: 2025-01-15

## 2025-01-15 NOTE — TELEPHONE ENCOUNTER
Discussed about patient's lab result. Patient has not been taking his Lipitor 40 mg daily, recommended to start taking, patient agrees with the plan. Will do a repeat follow up lab work later. PSA level is normal.

## 2025-01-15 NOTE — TELEPHONE ENCOUNTER
----- Message from Ary MAZARIEGOS sent at 1/15/2025 10:10 AM EST -----  Regarding: ECC Results Request  ECC Results Request    Which lab or imaging result is the patient calling about: [facility]     Which provider ordered the test? Kayleigh Mullins MD     Was this a Non-[facility] Provider: No    Date the test was preformed (MM/FROYLAN/YYYY): Jan 2 or 3, 2025   --------------------------------------------------------------------------------------------------------------------------    Relationship to Patient: Self     Call Back Info: Do not leave any message, patient will call back for answer  Preferred Call Back Number: Phone [phone]

## 2025-01-31 ENCOUNTER — TELEPHONE (OUTPATIENT)
Dept: INTERNAL MEDICINE | Age: 75
End: 2025-01-31

## 2025-01-31 NOTE — TELEPHONE ENCOUNTER
Pt called in stating he is not tolerating the Atorvastatin well. States he has been experiencing headaches,confusion and \"feeling off\" since starting the 40 mg dose.Pt states for the last 6 days he has been cutting the pill in half to reduce dose to 20 mg but still feels the same after reducing the dose. Please advise.

## 2025-02-04 ENCOUNTER — OFFICE VISIT (OUTPATIENT)
Dept: BARIATRICS/WEIGHT MGMT | Age: 75
End: 2025-02-04
Payer: MEDICARE

## 2025-02-04 VITALS
WEIGHT: 315 LBS | BODY MASS INDEX: 45.1 KG/M2 | DIASTOLIC BLOOD PRESSURE: 66 MMHG | HEIGHT: 70 IN | TEMPERATURE: 97.9 F | HEART RATE: 98 BPM | SYSTOLIC BLOOD PRESSURE: 120 MMHG

## 2025-02-04 DIAGNOSIS — E66.813 CLASS 3 SEVERE OBESITY DUE TO EXCESS CALORIES WITH SERIOUS COMORBIDITY AND BODY MASS INDEX (BMI) OF 45.0 TO 49.9 IN ADULT: ICD-10-CM

## 2025-02-04 DIAGNOSIS — E11.69 TYPE 2 DIABETES MELLITUS WITH OBESITY (HCC): Primary | ICD-10-CM

## 2025-02-04 DIAGNOSIS — E66.9 TYPE 2 DIABETES MELLITUS WITH OBESITY (HCC): Primary | ICD-10-CM

## 2025-02-04 DIAGNOSIS — E66.01 CLASS 3 SEVERE OBESITY DUE TO EXCESS CALORIES WITH SERIOUS COMORBIDITY AND BODY MASS INDEX (BMI) OF 45.0 TO 49.9 IN ADULT: ICD-10-CM

## 2025-02-04 PROCEDURE — 3074F SYST BP LT 130 MM HG: CPT | Performed by: INTERNAL MEDICINE

## 2025-02-04 PROCEDURE — 3046F HEMOGLOBIN A1C LEVEL >9.0%: CPT | Performed by: INTERNAL MEDICINE

## 2025-02-04 PROCEDURE — 2022F DILAT RTA XM EVC RTNOPTHY: CPT | Performed by: INTERNAL MEDICINE

## 2025-02-04 PROCEDURE — G8417 CALC BMI ABV UP PARAM F/U: HCPCS | Performed by: INTERNAL MEDICINE

## 2025-02-04 PROCEDURE — 4004F PT TOBACCO SCREEN RCVD TLK: CPT | Performed by: INTERNAL MEDICINE

## 2025-02-04 PROCEDURE — G8428 CUR MEDS NOT DOCUMENT: HCPCS | Performed by: INTERNAL MEDICINE

## 2025-02-04 PROCEDURE — 3078F DIAST BP <80 MM HG: CPT | Performed by: INTERNAL MEDICINE

## 2025-02-04 PROCEDURE — 1123F ACP DISCUSS/DSCN MKR DOCD: CPT | Performed by: INTERNAL MEDICINE

## 2025-02-04 PROCEDURE — 99211 OFF/OP EST MAY X REQ PHY/QHP: CPT

## 2025-02-04 PROCEDURE — 99214 OFFICE O/P EST MOD 30 MIN: CPT | Performed by: INTERNAL MEDICINE

## 2025-02-04 PROCEDURE — 3017F COLORECTAL CA SCREEN DOC REV: CPT | Performed by: INTERNAL MEDICINE

## 2025-02-04 RX ORDER — MECLIZINE HYDROCHLORIDE 25 MG/1
25 TABLET ORAL 2 TIMES DAILY PRN
Qty: 30 TABLET | Refills: 0 | Status: SHIPPED | OUTPATIENT
Start: 2025-02-04

## 2025-02-04 NOTE — TELEPHONE ENCOUNTER
Spoke with the patient regarding his concerns with atorvastatin. After reviewing his records, he was not compliant with atorvastatin, during his last in office visit, he was resumed 40 mg daily. Patient reports that when he is taking 40 mg during the daily, he feels fatigue, however, when he is taking 20 mg nightly, he tolerates well. I advised him that it is okay to continue 20 mg nightly and follow up on his next office visit.     In addition, the patient was also concerned about his med refills. I checked his meds, all his requested meds were refilled during his last office visit on 12/20/24. I advised her to call his pharmacy about this, if any further issues, call back to the clinic.    During the phone call, patient states also needing refill for his Antivert, he was previously taking it for tinnitis as needed. Refill sent to pharmacy.

## 2025-02-04 NOTE — PROGRESS NOTES
CC -   DM2, Obesity    BACKGROUND -   Last visit: 12/3/2024   First visit: 12/3/24    Obesity (all weight in lbs)  Initial Ht 69.75\", Wt 320.8,  BMI 46.36  Began after injury at his job iin 2005 (was started on Oxycontin)  HS Grad wt 198   Lowest   wt 198 (218-220 for long period of time - ntil age of 42)   Highest  wt 392 (back in 2007)  Pattern of wt gain: gradual, has been stable since 2007, infact has been losing some weight  Wt change past yr: ~-25 lbs  Most wt lost: 25 lbs  Other diets attempted: portion control, has not been able to cut down sugar    Initial Diet:    Number of meals per day - 2 (~10 am and 3-7 pm dinner)    Number of snacks per day - 0    Meal volume - 12\" plate,  rarely seconds    Fast food/convenience store - 3x/week    Restaurants (not fast food) - 3x/week   Sweets - 2d/week   Chips - 0-1d/week   Crackers/pretzels - 0d/week   Nuts - 0d/week   Peanut Butter - 0d/week   Popcorn - 0d/week (seldom   Dried fruit - 0d/week   Whole fruit - 2d/week   Breakfast cereal - 2d/week (on for a week and off for 3 week)   Granola/Protein/Energy bar - 0d/week   Sugar sweetened beverages - Pepsi - very little, no fruit juice, no coffee, [place] iced tea 1-2 can/day, no energy drinks; beer eg 2 beers at least twice a week   Protein - No supplements   Fiber - No supplements    Wt effect of HR foods = 2800 Shukri/wk = 400 Shukri/d= 15.9% DEN = 42 lb/year.     Initial Exercise:    Gym membership - Joined Y    Walking - no    Running - no    Resistance - no    Aerobic class - no. Walking is difficult with back pain. Isometric exercise and massotherapy     Sleep: h/o LATISHA. - currently not using CPAP (used to be on CPAP10 and titration showed it was helping).  ______________________    PFSH -  Past Medical History:   Diagnosis Date    Arthritis     Back pain 2005    injured back at work. on disability, CHRONIC , HERNIATED LUMBAR, H/O EPIDURAL INJECTIONS    Cerebral artery occlusion with cerebral infarction (HCC)

## 2025-02-09 DIAGNOSIS — E66.9 TYPE 2 DIABETES MELLITUS WITH OBESITY (HCC): ICD-10-CM

## 2025-02-09 DIAGNOSIS — E11.69 TYPE 2 DIABETES MELLITUS WITH OBESITY (HCC): ICD-10-CM

## 2025-02-21 ENCOUNTER — TELEPHONE (OUTPATIENT)
Dept: INTERNAL MEDICINE | Age: 75
End: 2025-02-21

## 2025-04-17 ENCOUNTER — HOSPITAL ENCOUNTER (OUTPATIENT)
Age: 75
Discharge: HOME | End: 2025-04-19
Payer: MEDICARE

## 2025-04-17 ENCOUNTER — HOSPITAL ENCOUNTER (OUTPATIENT)
Dept: GENERAL RADIOLOGY | Age: 75
Discharge: HOME | End: 2025-04-19
Payer: MEDICARE

## 2025-04-17 ENCOUNTER — OFFICE VISIT (OUTPATIENT)
Dept: INTERNAL MEDICINE | Age: 75
End: 2025-04-17
Payer: MEDICARE

## 2025-04-17 VITALS
TEMPERATURE: 97.4 F | OXYGEN SATURATION: 96 % | HEIGHT: 73 IN | DIASTOLIC BLOOD PRESSURE: 74 MMHG | RESPIRATION RATE: 18 BRPM | SYSTOLIC BLOOD PRESSURE: 118 MMHG | BODY MASS INDEX: 41.75 KG/M2 | WEIGHT: 315 LBS

## 2025-04-17 DIAGNOSIS — F31.2 BIPOLAR AFFECTIVE DISORDER, CURRENTLY MANIC, SEVERE, WITH PSYCHOTIC FEATURES (HCC): ICD-10-CM

## 2025-04-17 DIAGNOSIS — I48.20 CHRONIC ATRIAL FIBRILLATION (HCC): Primary | ICD-10-CM

## 2025-04-17 DIAGNOSIS — M25.512 ACUTE PAIN OF LEFT SHOULDER: ICD-10-CM

## 2025-04-17 PROCEDURE — 73030 X-RAY EXAM OF SHOULDER: CPT

## 2025-04-17 PROCEDURE — 3017F COLORECTAL CA SCREEN DOC REV: CPT

## 2025-04-17 PROCEDURE — 1123F ACP DISCUSS/DSCN MKR DOCD: CPT

## 2025-04-17 PROCEDURE — 93005 ELECTROCARDIOGRAM TRACING: CPT

## 2025-04-17 PROCEDURE — 93010 ELECTROCARDIOGRAM REPORT: CPT

## 2025-04-17 PROCEDURE — 72040 X-RAY EXAM NECK SPINE 2-3 VW: CPT

## 2025-04-17 PROCEDURE — 99212 OFFICE O/P EST SF 10 MIN: CPT

## 2025-04-17 PROCEDURE — 3078F DIAST BP <80 MM HG: CPT

## 2025-04-17 PROCEDURE — 3074F SYST BP LT 130 MM HG: CPT

## 2025-04-17 PROCEDURE — G8427 DOCREV CUR MEDS BY ELIG CLIN: HCPCS

## 2025-04-17 PROCEDURE — 4004F PT TOBACCO SCREEN RCVD TLK: CPT

## 2025-04-17 PROCEDURE — 99214 OFFICE O/P EST MOD 30 MIN: CPT

## 2025-04-17 PROCEDURE — G8417 CALC BMI ABV UP PARAM F/U: HCPCS

## 2025-04-17 RX ORDER — LIDOCAINE 4 G/G
1 PATCH TOPICAL DAILY
Qty: 30 PATCH | Refills: 0 | Status: SHIPPED | OUTPATIENT
Start: 2025-04-17 | End: 2025-05-17

## 2025-04-17 RX ORDER — TRAZODONE HYDROCHLORIDE 50 MG/1
TABLET ORAL
COMMUNITY
Start: 2025-02-24

## 2025-04-17 NOTE — PATIENT INSTRUCTIONS
Dear Ángel Adams,    Thank you for coming to your appointment today. I hope all of your concerns have been addressed.     Please make sure to do the following:  Continue your medications as listed.  Please get  images before our next visit. You will be contacted if there is anything abnormal otherwise your results will be discussed on your next visit. You are welcome to call anytime and inquire about the results.   Referrals have been made to sports medicine:  If you do not hear from the office in 1 week, please call the number listed.  You will see you in 3 weeks for follow up on symptoms    Call for a sooner appointment if you develop any issues     Have a great day!    Sincerely,  Gretel Stearns M.D  4/17/2025  9:08 AM

## 2025-04-17 NOTE — PROGRESS NOTES
Ángel Adams (:  1950) is a 74 y.o. male,Established patient, here for evaluation of the following chief complaint(s):  Palpitations (X's 5 days)         Assessment & Plan  Chronic atrial fibrillation (HCC)   Chronic, at goal (stable), continue current treatment plan    Orders:    EKG 12 lead; Future    Bipolar affective disorder, currently manic, severe, with psychotic features (HCC)   Chronic, at goal (stable), continue current treatment plan         Acute pain of left shoulder   New, uncertain prognosis, continue current treatment plan    Orders:    XR CERVICAL SPINE (2-3 VIEWS); Future    Goran Singer MD, Sports Medicine, [facility]    XR SHOULDER LEFT (MIN 2 VIEWS); Future    lidocaine 4 % external patch; Place 1 patch onto the skin daily      Return in about 8 weeks (around 6/10/2025) for xray.       Subjective   HPI    Mr Neal presented to the clinic for an acute visit this morning. He was complaining of left sided shoulder pain, of 3 weeks duration, constant in nature with relieving factors. Patient does endorse the pain radiates from the shoulder joint to his left arm and neck aggravating on arm and neck movement. He also complained of intermittent palpitations since the arm pain has started. No concerns of chest pain, pain in the jaw, shortness of breath, lightheadedness/ dizziness. He has not had similar episodes in the past. On examination, bautista sign was negative,meanwhile spurling sign, neer's, hawking and benito sign's were positive. I was unable to elicit focal neurological deficits in the left arm due to significant pain. No focal neurological deficits in rest of the other extremity. The origin of the pain is either likely from the rotator cuff or cervical radiculopathy. We ordered xray cervical spine and shoulder joint to rule out fractures. We have referred the patient to sports medicine. He is asked to f/u in 3 weeks. He may require MRI cervical neck in the

## 2025-04-17 NOTE — PROGRESS NOTES
[facility]  Internal Medicine Residency Clinic    Attending Physician Statement  I have discussed the case, including pertinent history and exam findings with the resident physician.I have seen and examined the patient and the key elements of the encounter have been performed by me. I agree with the assessment, plan and orders as documented by the resident. I have reviewed the relevant PMHx, PSHx, FamHx, SocialHx, medications, and allergies and updated history as appropriate.    Walk-in for left shoulder pain and episodes of palpitations.    Left shoulder pain  Constant, improving with ice, exam c/w rotator cuff impingement syndrome    Recommend xray cervical and left shoulder and referral to sports med for consideration of CSI    Palpitations with hx PAF   EKG in office sinus rhythm with 1st degree  Continues on digoxin and cardizem and if persists consider event monitor       Remainder of medical problems as per resident note.    Alejandro Medina DO  4/17/2025 8:57 AM

## 2025-04-18 ASSESSMENT — ENCOUNTER SYMPTOMS
RHINORRHEA: 0
VOMITING: 0
ABDOMINAL DISTENTION: 0
STRIDOR: 0
WHEEZING: 0
NAUSEA: 0
BACK PAIN: 1
COUGH: 0
SORE THROAT: 0
DIARRHEA: 0
SHORTNESS OF BREATH: 0
CHEST TIGHTNESS: 0

## 2025-04-30 ENCOUNTER — OFFICE VISIT (OUTPATIENT)
Dept: BARIATRICS/WEIGHT MGMT | Age: 75
End: 2025-04-30
Payer: MEDICARE

## 2025-04-30 ENCOUNTER — OFFICE VISIT (OUTPATIENT)
Dept: ORTHOPEDIC SURGERY | Age: 75
End: 2025-04-30
Payer: MEDICARE

## 2025-04-30 VITALS
WEIGHT: 315 LBS | HEART RATE: 64 BPM | HEIGHT: 70 IN | DIASTOLIC BLOOD PRESSURE: 57 MMHG | BODY MASS INDEX: 45.1 KG/M2 | TEMPERATURE: 97.2 F | SYSTOLIC BLOOD PRESSURE: 125 MMHG

## 2025-04-30 VITALS — BODY MASS INDEX: 45.1 KG/M2 | WEIGHT: 315 LBS | HEIGHT: 70 IN

## 2025-04-30 DIAGNOSIS — G89.29 CHRONIC LEFT SHOULDER PAIN: Primary | ICD-10-CM

## 2025-04-30 DIAGNOSIS — M25.512 CHRONIC LEFT SHOULDER PAIN: Primary | ICD-10-CM

## 2025-04-30 DIAGNOSIS — E11.69 TYPE 2 DIABETES MELLITUS WITH OBESITY (HCC): Primary | ICD-10-CM

## 2025-04-30 DIAGNOSIS — E66.9 TYPE 2 DIABETES MELLITUS WITH OBESITY (HCC): Primary | ICD-10-CM

## 2025-04-30 DIAGNOSIS — E66.813 CLASS 3 SEVERE OBESITY DUE TO EXCESS CALORIES WITH SERIOUS COMORBIDITY AND BODY MASS INDEX (BMI) OF 45.0 TO 49.9 IN ADULT (HCC): ICD-10-CM

## 2025-04-30 PROCEDURE — 1123F ACP DISCUSS/DSCN MKR DOCD: CPT | Performed by: FAMILY MEDICINE

## 2025-04-30 PROCEDURE — G8417 CALC BMI ABV UP PARAM F/U: HCPCS | Performed by: FAMILY MEDICINE

## 2025-04-30 PROCEDURE — 3017F COLORECTAL CA SCREEN DOC REV: CPT | Performed by: INTERNAL MEDICINE

## 2025-04-30 PROCEDURE — G8428 CUR MEDS NOT DOCUMENT: HCPCS | Performed by: INTERNAL MEDICINE

## 2025-04-30 PROCEDURE — 99203 OFFICE O/P NEW LOW 30 MIN: CPT | Performed by: FAMILY MEDICINE

## 2025-04-30 PROCEDURE — 1159F MED LIST DOCD IN RCRD: CPT | Performed by: INTERNAL MEDICINE

## 2025-04-30 PROCEDURE — 3078F DIAST BP <80 MM HG: CPT | Performed by: INTERNAL MEDICINE

## 2025-04-30 PROCEDURE — G8417 CALC BMI ABV UP PARAM F/U: HCPCS | Performed by: INTERNAL MEDICINE

## 2025-04-30 PROCEDURE — 20611 DRAIN/INJ JOINT/BURSA W/US: CPT | Performed by: FAMILY MEDICINE

## 2025-04-30 PROCEDURE — 4004F PT TOBACCO SCREEN RCVD TLK: CPT | Performed by: FAMILY MEDICINE

## 2025-04-30 PROCEDURE — 3074F SYST BP LT 130 MM HG: CPT | Performed by: INTERNAL MEDICINE

## 2025-04-30 PROCEDURE — 4004F PT TOBACCO SCREEN RCVD TLK: CPT | Performed by: INTERNAL MEDICINE

## 2025-04-30 PROCEDURE — 99211 OFF/OP EST MAY X REQ PHY/QHP: CPT

## 2025-04-30 PROCEDURE — 1123F ACP DISCUSS/DSCN MKR DOCD: CPT | Performed by: INTERNAL MEDICINE

## 2025-04-30 PROCEDURE — G8427 DOCREV CUR MEDS BY ELIG CLIN: HCPCS | Performed by: FAMILY MEDICINE

## 2025-04-30 PROCEDURE — 99214 OFFICE O/P EST MOD 30 MIN: CPT | Performed by: INTERNAL MEDICINE

## 2025-04-30 PROCEDURE — 3017F COLORECTAL CA SCREEN DOC REV: CPT | Performed by: FAMILY MEDICINE

## 2025-04-30 PROCEDURE — 2022F DILAT RTA XM EVC RTNOPTHY: CPT | Performed by: INTERNAL MEDICINE

## 2025-04-30 PROCEDURE — 3046F HEMOGLOBIN A1C LEVEL >9.0%: CPT | Performed by: INTERNAL MEDICINE

## 2025-04-30 PROCEDURE — 1159F MED LIST DOCD IN RCRD: CPT | Performed by: FAMILY MEDICINE

## 2025-04-30 RX ORDER — BETAMETHASONE SODIUM PHOSPHATE AND BETAMETHASONE ACETATE 3; 3 MG/ML; MG/ML
6 INJECTION, SUSPENSION INTRA-ARTICULAR; INTRALESIONAL; INTRAMUSCULAR; SOFT TISSUE ONCE
Status: COMPLETED | OUTPATIENT
Start: 2025-04-30 | End: 2025-04-30

## 2025-04-30 RX ORDER — LIDOCAINE HYDROCHLORIDE 10 MG/ML
3 INJECTION, SOLUTION INFILTRATION; PERINEURAL ONCE
Status: COMPLETED | OUTPATIENT
Start: 2025-04-30 | End: 2025-04-30

## 2025-04-30 RX ADMIN — BETAMETHASONE SODIUM PHOSPHATE AND BETAMETHASONE ACETATE 6 MG: 3; 3 INJECTION, SUSPENSION INTRA-ARTICULAR; INTRALESIONAL; INTRAMUSCULAR; SOFT TISSUE at 16:52

## 2025-04-30 RX ADMIN — LIDOCAINE HYDROCHLORIDE 3 ML: 10 INJECTION, SOLUTION INFILTRATION; PERINEURAL at 16:53

## 2025-04-30 NOTE — PATIENT INSTRUCTIONS
Rules:  Limit sweets to one day per month  Limit chips/crackers/pretzels/nuts/popcorn to 150 shukri/day  Eliminate all sugar sweetened beverages (including fruit juice)  Limit restaurants (including fast food and food from a convenience store) to one time every two weeks while in town    Requirements:  Make sure protein intake is at least 100 grams per day (do not count protein every day; instead spot check your intake every 2-3 weeks and make sure what you think you are getting is close to accurate; consider using a protein shake if needed; these are in the pharmacy section of the stores, not the grocery section; Premier, Pure Protein and Fairlife are relatively inexpensive and taste good to most patients; other options are Nectar, Boost Max, Ensure Max, BeneProtein and GNC lean (which is lactose-free);   Nectar fruit, Premier Protein Clear, IsoPure Protein Drink, and Protein 2 O are water-based options; Quest (or Cosco, which is cheaper and is ordered on Amazon) and the WILEX 1 protein bars can also be used, but have less protein in them ) (<200 Shukri, >25 g protein) - (chicken, fish, turkey, egg white)  (Disclaimer: Dietary supplements rarely have their listed ingredients and the amount of each verified by a third party other. Sometimes they give verification for their claims to be GMO and gluten free and to be organic. However, even such verifications as these may still be untrustworthy.)  Make sure that fiber intake is at least 25 grams per day. Do this in part or whole by taking 14 tablespoons of Fiber one original cereal or Stewart's All Bran Buds cereal, or 5 tablespoons of wheat dextrin powder (Benefiber or generic brand); for both of these, start with 1/8th - 1/4th the target amount and every week add another 1/8th - 1/4th until reaching the target).  Also, fiber gummies containing inulin (such as Nature Mad, Blancas, Benefiber) or Fiber Choice Pre-biotic tablets containing inulin are also an option.1 cup of

## 2025-04-30 NOTE — PROGRESS NOTES
ordered on Amazon) and the [employer] 1 protein bars can also be used, but have less protein in them ) (<200 Shukri, >25 g protein) - (chicken, fish, turkey, egg white)  (Disclaimer: Dietary supplements rarely have their listed ingredients and the amount of each verified by a third party other. Sometimes they give verification for their claims to be GMO and gluten free and to be organic. However, even such verifications as these may still be untrustworthy.)  Make sure that fiber intake is at least 25 grams per day. Do this in part or whole by taking 14 tablespoons of Fiber one original cereal or Stewart's All Bran Buds cereal, or 5 tablespoons of wheat dextrin powder (Benefiber or generic brand); for both of these, start with 1/8th - 1/4th the target amount and every week add another 1/8th - 1/4th until reaching the target).  Also, fiber gummies containing inulin (such as Nature Mad, Blancas, Benefiber) or Fiber Choice Pre-biotic tablets containing inulin are also an option.1 cup of beans or peas and [employer] Xtreme Wellness High Fiber (7grams in 30 calories) Carb Lean tortillas are excellent choicesare excellent choices, as well.  These fiber supplements are for the health of the colon. Their purpose is not to prevent or treat constipation.  Take one multivitamin every day    Targets:  Limit calorie intake to 1700 calories/day  Exercise 30 minutes daily - eg chair exercises - can be divided  Avoid eating 2 hours within bedtime.     Tips:  Do not eat outside of the dining room or the kitchen  Do not eat while watching TV, videos, working on the computer or using a smart phone  Do not eat food out of a multi-serving bag or container.    Ozempic:  Restart Ozempic at 0.25 mg under the skin for once a week for 4 weeks, and then 0.5 mg weekly.    Follow up in 2 months.    Medication management: Raven Ruiz MD  Internal Medicine/Obesity Medicine  4/30/2025.

## 2025-05-12 NOTE — PROGRESS NOTES
[facility]  PRIMARY CARE SPORTS MEDICINE  DATE OF VISIT : 2025     Patient : Ángel Adams  Age : 74 y.o.   : 1950  MRN : [id number]   ______________________________________________________________________    Chief Complaint :   Chief Complaint   Patient presents with    Shoulder Pain     Left  3 months   shoulder  started at lower arm and worked its way up         HPI : Ángel Adams is 74 y.o. male who presented to the clinic today for evaluation of left shoulder pain. The onset of the pain was 3 months ago, with no known mechanism of injury.  Current symptoms include left lateral shoulder pain. No history of dislocation. Patient denies numbness and tingling. Symptoms are aggravated by repetitive use, work at or above shoulder height, and sleeping on affected side. Evaluation to date: XRs of the left shoulder which demonstrate no acute fractures or dislocations.  Treatment to date: avoidance of offending activity and OTC analgesics which are not very effective.     Past Medical History :  Past Medical History:   Diagnosis Date    Arthritis     Back pain     injured back at work. on disability, CHRONIC , HERNIATED LUMBAR, H/O EPIDURAL INJECTIONS    Cerebral artery occlusion with cerebral infarction (HCC)     20 - trouble talking, stutters right hand weak     Depression     pt states he s doing well at this time. off his meds    Diabetes mellitus (HCC)     Hx of blood clots     Hyperlipidemia     controlled     PFO (patent foramen ovale)     S/P patent foramen ovale closure     Sleep apnea     ordered cpap, but pt does not use it    Testicular mass     LEFT, removed surgically       Past Surgical History:   Procedure Laterality Date    ACHILLES TENDON SURGERY  1975    left foot, [place], [place]    COLONOSCOPY  1990s    multiple polyps (x 3), [facility]    COLONOSCOPY  2015    incomplete colonoscopy to mid ascending colon, BE xray ordered, Dr. Garcia, [facility]    EYE SURGERY      right eye

## 2025-06-16 NOTE — PROGRESS NOTES
[facility]  Internal Medicine Residency Program  ACC Note      SUBJECTIVE:  CC: had concerns including Medication Refill, Follow-up, and Diabetes.      HPI:  Ángel Adams is a 74 y.o.male  has a past medical history of Arthritis, Atrial fibrillation (HCC), Back pain, BPH (benign prostatic hyperplasia), Cerebral artery occlusion with cerebral infarction (HCC), Depression, Diabetes mellitus (HCC), Hx of blood clots, Hyperlipidemia, Ischemic stroke (HCC), PFO (patent foramen ovale), S/P patent foramen ovale closure, Sleep apnea, and Testicular mass.  presenting to [facility] for follow up appointment. Denies any concerns, here for medication refill.     Left shoulder pain  -follows with Dr. Cruz        Diabetes Mellitus type 2   Last a1c 6.3   Continue Jardiance and ozempic   126-170 blood glucose at home  Metformin was d/c d/t side effects  3/6/23- Microalbumin/Cr  13.8  Ordered poct urine micoralbumin   Follows with podiatry      Hypertension  - bp 147/71    atrial fibrillation   Ddx July 2020   Chadvasc 5; Continue Eliquis   On Cardizem for rate control  Digoxin; repeat BMP for Cr  Patient previously saw EP 3/2022 was advised to follow up in 3 months.  Zio patch for afib burden if high consider referring back to EP.     Lumbar radiculopathy  -Follows with Dr. Sorensen. 3/28/2023 steroid injection to back  -follows with pain clinic     BPH   -Continue flomax      History of frontal ischemic stroke on 6/24/2018  -On Lipitor 40 mg daily and Eliquis as above     Hyperlipidemia   -Last lipid screen done 12/28/2023  -HDL 58, LDL 58, tag 88, cholesterol 134  - continue Lipitor 40 mg   -repeat lipid panel; if LDL >70 consider increasing lipitor      GERD  -Protonix     -Patient saw ophthalmologist who had concerns for left quadrant homonymous hemianopsia and concern for TIA  -MRI 4/1/2024 no acute abnormality, multiple chronic lacunar infarctions in the bilateral cerebral hemispheres, mild volume loss

## 2025-06-17 ENCOUNTER — OFFICE VISIT (OUTPATIENT)
Age: 75
End: 2025-06-17
Payer: MEDICARE

## 2025-06-17 VITALS
HEART RATE: 85 BPM | HEIGHT: 69 IN | TEMPERATURE: 97 F | RESPIRATION RATE: 18 BRPM | WEIGHT: 315 LBS | SYSTOLIC BLOOD PRESSURE: 147 MMHG | OXYGEN SATURATION: 94 % | BODY MASS INDEX: 46.65 KG/M2 | DIASTOLIC BLOOD PRESSURE: 71 MMHG

## 2025-06-17 DIAGNOSIS — E51.9 THIAMINE DEFICIENCY, UNSPECIFIED: ICD-10-CM

## 2025-06-17 DIAGNOSIS — E66.9 TYPE 2 DIABETES MELLITUS WITH OBESITY (HCC): Chronic | ICD-10-CM

## 2025-06-17 DIAGNOSIS — I48.20 CHRONIC ATRIAL FIBRILLATION (HCC): ICD-10-CM

## 2025-06-17 DIAGNOSIS — E78.5 HYPERLIPIDEMIA, UNSPECIFIED HYPERLIPIDEMIA TYPE: ICD-10-CM

## 2025-06-17 DIAGNOSIS — Z13.9 SCREENING DUE: ICD-10-CM

## 2025-06-17 DIAGNOSIS — I48.0 PAROXYSMAL ATRIAL FIBRILLATION (HCC): Primary | ICD-10-CM

## 2025-06-17 DIAGNOSIS — R35.1 BENIGN PROSTATIC HYPERPLASIA WITH NOCTURIA: ICD-10-CM

## 2025-06-17 DIAGNOSIS — E11.69 TYPE 2 DIABETES MELLITUS WITH OBESITY (HCC): Chronic | ICD-10-CM

## 2025-06-17 DIAGNOSIS — K21.9 GASTROESOPHAGEAL REFLUX DISEASE, UNSPECIFIED WHETHER ESOPHAGITIS PRESENT: ICD-10-CM

## 2025-06-17 DIAGNOSIS — N40.1 BENIGN PROSTATIC HYPERPLASIA WITH NOCTURIA: ICD-10-CM

## 2025-06-17 PROCEDURE — 1159F MED LIST DOCD IN RCRD: CPT

## 2025-06-17 PROCEDURE — G8417 CALC BMI ABV UP PARAM F/U: HCPCS

## 2025-06-17 PROCEDURE — 3078F DIAST BP <80 MM HG: CPT

## 2025-06-17 PROCEDURE — 3077F SYST BP >= 140 MM HG: CPT

## 2025-06-17 PROCEDURE — 3017F COLORECTAL CA SCREEN DOC REV: CPT

## 2025-06-17 PROCEDURE — 99212 OFFICE O/P EST SF 10 MIN: CPT

## 2025-06-17 PROCEDURE — 4004F PT TOBACCO SCREEN RCVD TLK: CPT

## 2025-06-17 PROCEDURE — 99213 OFFICE O/P EST LOW 20 MIN: CPT

## 2025-06-17 PROCEDURE — 3046F HEMOGLOBIN A1C LEVEL >9.0%: CPT

## 2025-06-17 PROCEDURE — 1123F ACP DISCUSS/DSCN MKR DOCD: CPT

## 2025-06-17 PROCEDURE — 2022F DILAT RTA XM EVC RTNOPTHY: CPT

## 2025-06-17 PROCEDURE — G8427 DOCREV CUR MEDS BY ELIG CLIN: HCPCS

## 2025-06-17 RX ORDER — THIAMINE MONONITRATE (VIT B1) 100 MG
100 TABLET ORAL DAILY
Qty: 90 TABLET | Refills: 1 | Status: SHIPPED | OUTPATIENT
Start: 2025-06-17

## 2025-06-17 RX ORDER — MECLIZINE HYDROCHLORIDE 25 MG/1
25 TABLET ORAL 2 TIMES DAILY PRN
Qty: 30 TABLET | Refills: 0 | Status: SHIPPED | OUTPATIENT
Start: 2025-06-17

## 2025-06-17 RX ORDER — DIGOXIN 125 MCG
125 TABLET ORAL DAILY
Qty: 30 TABLET | Status: CANCELLED | OUTPATIENT
Start: 2025-06-17

## 2025-06-17 RX ORDER — GLUCOSAMINE HCL/CHONDROITIN SU 500-400 MG
1 CAPSULE ORAL 2 TIMES DAILY
Qty: 100 STRIP | Refills: 11 | Status: SHIPPED | OUTPATIENT
Start: 2025-06-17

## 2025-06-17 RX ORDER — ATORVASTATIN CALCIUM 40 MG/1
40 TABLET, FILM COATED ORAL NIGHTLY
Qty: 90 TABLET | Refills: 1 | Status: SHIPPED | OUTPATIENT
Start: 2025-06-17

## 2025-06-17 RX ORDER — TAMSULOSIN HYDROCHLORIDE 0.4 MG/1
0.4 CAPSULE ORAL DAILY
Qty: 90 CAPSULE | Refills: 1 | Status: SHIPPED | OUTPATIENT
Start: 2025-06-17

## 2025-06-17 RX ORDER — PANTOPRAZOLE SODIUM 20 MG/1
20 TABLET, DELAYED RELEASE ORAL
Qty: 90 TABLET | Refills: 1 | Status: SHIPPED | OUTPATIENT
Start: 2025-06-17

## 2025-06-17 RX ORDER — DILTIAZEM HYDROCHLORIDE 120 MG/1
120 CAPSULE, COATED, EXTENDED RELEASE ORAL DAILY
Qty: 90 CAPSULE | Refills: 1 | Status: SHIPPED | OUTPATIENT
Start: 2025-06-17

## 2025-06-17 SDOH — ECONOMIC STABILITY: FOOD INSECURITY: WITHIN THE PAST 12 MONTHS, THE FOOD YOU BOUGHT JUST DIDN'T LAST AND YOU DIDN'T HAVE MONEY TO GET MORE.: OFTEN TRUE

## 2025-06-17 SDOH — ECONOMIC STABILITY: FOOD INSECURITY: WITHIN THE PAST 12 MONTHS, YOU WORRIED THAT YOUR FOOD WOULD RUN OUT BEFORE YOU GOT MONEY TO BUY MORE.: OFTEN TRUE

## 2025-06-17 ASSESSMENT — PATIENT HEALTH QUESTIONNAIRE - PHQ9
2. FEELING DOWN, DEPRESSED OR HOPELESS: NOT AT ALL
SUM OF ALL RESPONSES TO PHQ QUESTIONS 1-9: 5
10. IF YOU CHECKED OFF ANY PROBLEMS, HOW DIFFICULT HAVE THESE PROBLEMS MADE IT FOR YOU TO DO YOUR WORK, TAKE CARE OF THINGS AT HOME, OR GET ALONG WITH OTHER PEOPLE: VERY DIFFICULT
9. THOUGHTS THAT YOU WOULD BE BETTER OFF DEAD, OR OF HURTING YOURSELF: NOT AT ALL
8. MOVING OR SPEAKING SO SLOWLY THAT OTHER PEOPLE COULD HAVE NOTICED. OR THE OPPOSITE, BEING SO FIGETY OR RESTLESS THAT YOU HAVE BEEN MOVING AROUND A LOT MORE THAN USUAL: NOT AT ALL
SUM OF ALL RESPONSES TO PHQ QUESTIONS 1-9: 5
4. FEELING TIRED OR HAVING LITTLE ENERGY: MORE THAN HALF THE DAYS
SUM OF ALL RESPONSES TO PHQ QUESTIONS 1-9: 5
7. TROUBLE CONCENTRATING ON THINGS, SUCH AS READING THE NEWSPAPER OR WATCHING TELEVISION: NOT AT ALL
3. TROUBLE FALLING OR STAYING ASLEEP: SEVERAL DAYS
SUM OF ALL RESPONSES TO PHQ QUESTIONS 1-9: 5
5. POOR APPETITE OR OVEREATING: SEVERAL DAYS
1. LITTLE INTEREST OR PLEASURE IN DOING THINGS: NOT AT ALL
6. FEELING BAD ABOUT YOURSELF - OR THAT YOU ARE A FAILURE OR HAVE LET YOURSELF OR YOUR FAMILY DOWN: SEVERAL DAYS

## 2025-06-17 NOTE — PATIENT INSTRUCTIONS
[employer] - Financial Resources*  (Call [employer]/211 if need more resources.)       Utility:  [employer]  What they offer: Limited assistance to restore/ prevent utility disconnection.  Phone Number: [phone]  Address: [facility] [place] [address]  Website: [employer]  [employer] Action Program  Utility assistance  [phone]  [facility] Community Action Partnership  Utility assistance   [phone]  Community [employer] [facility]  Utility assistance  [phone]  Financial or Medical  HELP NETWORK OF [employer]:  What they do: Provides 24-hr, 7 days a week access to information on community resources for financial help. [facility] AND [facility]  Phone: 211 or [phone]            [facility] JOB AND FAMILY SERVICES:  MAIN [facility] LINE FOR ALL [facility]: [phone]  What they do: [place] works first with temporary cash assistance if there are children in household.   [facility] DJFS: [address] [place] [address]  Phone: [phone], [phone]  [facility] DJFS: [address] [place] [address]  Phone: [phone]  [facility] DJFS: [address] [place] [address]  Phone: [phone]  Website: [place].[facility]    [employer] Financial Assistance  What they offer: Assistance with [employer] bills  Phone: [phone], option #5   Medications:  [employer]  What they offer: [employer] tracks prescription drug prices and provides free drug coupons for discounts on medications.  Website: https://www.iMedicare  [employer]  What they offer: [employer] offers free information on medications and healthcare cost savings programs including prescription assistance programs, coupons, and discount programs.  Helpline: 102.720.8544  Website: https://www.needEarth Networkseds.org    [employer]  What they offer: Information about free and low-cost medicine programs.  Website:

## 2025-06-17 NOTE — PROGRESS NOTES
[facility]  Internal Medicine Residency Clinic    Attending Physician Statement  I have discussed the case, including pertinent history and exam findings with the resident physician.  I agree with the assessment, plan and orders as documented by the resident. I have reviewed all pertinent PMHx, PSHx, FamHx, SocialHx, medications, and allergies and updated history as appropriate.    Patient here for routine follow up of medical problems.     Atrial Fibrillation   -controlled rate and rhythm;   -continue current medications and anticoagulation     Left Shoulder Pain  -topical and oral pain control   -exercises     NIDDm2  -controlled; The current medical regimen is effective;  continue present plan and medications.  -continue current medications     HTN  -elevated today; plan to recheck; likely 2/2 exercise     HCM  -colonoscopy   -blood work   -lung cancer screening     Remainder of medical problems as per resident note.    Naren Perkins Jr, DO  6/17/25

## 2025-07-09 ENCOUNTER — HOSPITAL ENCOUNTER (OUTPATIENT)
Dept: SLEEP CENTER | Age: 75
Discharge: HOME | End: 2025-07-09
Payer: MEDICARE

## 2025-07-09 DIAGNOSIS — I48.0 PAROXYSMAL ATRIAL FIBRILLATION (HCC): ICD-10-CM

## 2025-07-09 PROCEDURE — 93246 EXT ECG>7D<15D RECORDING: CPT

## 2025-07-22 ENCOUNTER — HOSPITAL ENCOUNTER (OUTPATIENT)
Age: 75
Discharge: HOME | End: 2025-07-22
Payer: MEDICARE

## 2025-07-22 ENCOUNTER — HOSPITAL ENCOUNTER (OUTPATIENT)
Dept: CT IMAGING | Age: 75
Discharge: HOME | End: 2025-07-24
Payer: MEDICARE

## 2025-07-22 DIAGNOSIS — Z13.9 SCREENING DUE: ICD-10-CM

## 2025-07-22 DIAGNOSIS — E78.5 HYPERLIPIDEMIA, UNSPECIFIED HYPERLIPIDEMIA TYPE: ICD-10-CM

## 2025-07-22 LAB
ANION GAP SERPL CALCULATED.3IONS-SCNC: 13 MMOL/L (ref 7–16)
BUN SERPL-MCNC: 17 MG/DL (ref 8–23)
CALCIUM SERPL-MCNC: 8.8 MG/DL (ref 8.8–10.2)
CHLORIDE SERPL-SCNC: 106 MMOL/L (ref 98–107)
CHOLEST SERPL-MCNC: 194 MG/DL
CO2 SERPL-SCNC: 20 MMOL/L (ref 22–29)
CREAT SERPL-MCNC: 1.3 MG/DL (ref 0.7–1.2)
CREAT UR-MCNC: 128 MG/DL (ref 40–278)
GFR, ESTIMATED: 56 ML/MIN/1.73M2
GLUCOSE SERPL-MCNC: 118 MG/DL (ref 74–99)
HDLC SERPL-MCNC: 59 MG/DL
LDLC SERPL CALC-MCNC: 106 MG/DL
MICROALBUMIN UR-MCNC: <12 MG/L (ref 0–20)
MICROALBUMIN/CREAT UR-RTO: <9 MCG/MG CREAT (ref 0–30)
POTASSIUM SERPL-SCNC: 4.5 MMOL/L (ref 3.5–5.1)
SODIUM SERPL-SCNC: 139 MMOL/L (ref 136–145)
TRIGL SERPL-MCNC: 146 MG/DL
VLDLC SERPL CALC-MCNC: 29 MG/DL

## 2025-07-22 PROCEDURE — 71271 CT THORAX LUNG CANCER SCR C-: CPT

## 2025-07-22 PROCEDURE — 80061 LIPID PANEL: CPT

## 2025-07-22 PROCEDURE — 82570 ASSAY OF URINE CREATININE: CPT

## 2025-07-22 PROCEDURE — 82043 UR ALBUMIN QUANTITATIVE: CPT

## 2025-07-22 PROCEDURE — 80048 BASIC METABOLIC PNL TOTAL CA: CPT

## 2025-07-22 PROCEDURE — 36415 COLL VENOUS BLD VENIPUNCTURE: CPT

## 2025-08-11 ENCOUNTER — TELEPHONE (OUTPATIENT)
Dept: SLEEP CENTER | Age: 75
End: 2025-08-11